# Patient Record
Sex: MALE | Race: WHITE | Employment: OTHER | URBAN - METROPOLITAN AREA
[De-identification: names, ages, dates, MRNs, and addresses within clinical notes are randomized per-mention and may not be internally consistent; named-entity substitution may affect disease eponyms.]

---

## 2017-01-06 ENCOUNTER — TELEPHONE (OUTPATIENT)
Dept: SURGERY | Age: 82
End: 2017-01-06

## 2017-01-10 ENCOUNTER — TELEPHONE (OUTPATIENT)
Dept: SURGERY | Age: 82
End: 2017-01-10

## 2017-01-12 ENCOUNTER — HOSPITAL ENCOUNTER (OUTPATIENT)
Dept: CARDIAC CATH/INVASIVE PROCEDURES | Age: 82
Discharge: HOME | End: 2017-01-12
Attending: SURGERY | Admitting: SURGERY

## 2017-01-12 VITALS — BODY MASS INDEX: 26.82 KG/M2 | HEIGHT: 65 IN | WEIGHT: 161 LBS

## 2017-01-12 DIAGNOSIS — I70.213 ATHEROSCLEROSIS OF NATIVE ARTERY OF BOTH LOWER EXTREMITIES WITH INTERMITTENT CLAUDICATION (HCC): Primary | ICD-10-CM

## 2017-01-12 LAB
ANION GAP SERPL CALCULATED.3IONS-SCNC: 10 MMOL/L (ref 3–16)
BUN BLDV-MCNC: 25 MG/DL (ref 7–20)
CALCIUM SERPL-MCNC: 9.2 MG/DL (ref 8.3–10.6)
CHLORIDE BLD-SCNC: 103 MMOL/L (ref 99–110)
CO2: 26 MMOL/L (ref 21–32)
CREAT SERPL-MCNC: 1.5 MG/DL (ref 0.8–1.3)
GFR AFRICAN AMERICAN: 54
GFR NON-AFRICAN AMERICAN: 45
GLUCOSE BLD-MCNC: 205 MG/DL (ref 70–99)
HCT VFR BLD CALC: 43.1 % (ref 40.5–52.5)
HEMOGLOBIN: 13.9 G/DL (ref 13.5–17.5)
MCH RBC QN AUTO: 28.4 PG (ref 26–34)
MCHC RBC AUTO-ENTMCNC: 32.2 G/DL (ref 31–36)
MCV RBC AUTO: 88.4 FL (ref 80–100)
PDW BLD-RTO: 14.8 % (ref 12.4–15.4)
PLATELET # BLD: 229 K/UL (ref 135–450)
PMV BLD AUTO: 8.5 FL (ref 5–10.5)
POTASSIUM SERPL-SCNC: 5.1 MMOL/L (ref 3.5–5.1)
RBC # BLD: 4.88 M/UL (ref 4.2–5.9)
SODIUM BLD-SCNC: 139 MMOL/L (ref 136–145)
WBC # BLD: 8.9 K/UL (ref 4–11)

## 2017-01-12 PROCEDURE — 37228 PR REVSC OPN/PRQ TIB/PERO W/ANGIOPLASTY UNI: CPT | Performed by: SURGERY

## 2017-01-12 PROCEDURE — 37224 PR REVSC OPN/PRG FEM/POP W/ANGIOPLASTY UNI: CPT | Performed by: SURGERY

## 2017-01-12 PROCEDURE — 75710 ARTERY X-RAYS ARM/LEG: CPT | Performed by: SURGERY

## 2017-01-12 PROCEDURE — 76937 US GUIDE VASCULAR ACCESS: CPT | Performed by: SURGERY

## 2017-01-12 PROCEDURE — 75774 ARTERY X-RAY EACH VESSEL: CPT | Performed by: SURGERY

## 2017-01-12 PROCEDURE — 37220 PR REVASCULARIZATION ILIAC ARTERY ANGIOP 1ST VSL: CPT | Performed by: SURGERY

## 2017-01-12 RX ORDER — MORPHINE SULFATE 10 MG/ML
2 INJECTION, SOLUTION INTRAMUSCULAR; INTRAVENOUS
Status: ACTIVE | OUTPATIENT
Start: 2017-01-12 | End: 2017-01-12

## 2017-01-12 RX ORDER — SODIUM CHLORIDE 0.9 % (FLUSH) 0.9 %
10 SYRINGE (ML) INJECTION EVERY 12 HOURS SCHEDULED
Status: DISCONTINUED | OUTPATIENT
Start: 2017-01-12 | End: 2017-01-12 | Stop reason: ALTCHOICE

## 2017-01-12 RX ORDER — ONDANSETRON 2 MG/ML
4 INJECTION INTRAMUSCULAR; INTRAVENOUS EVERY 6 HOURS PRN
Status: DISCONTINUED | OUTPATIENT
Start: 2017-01-12 | End: 2017-01-13 | Stop reason: HOSPADM

## 2017-01-12 RX ORDER — FENTANYL CITRATE 50 UG/ML
25 INJECTION, SOLUTION INTRAMUSCULAR; INTRAVENOUS
Status: ACTIVE | OUTPATIENT
Start: 2017-01-12 | End: 2017-01-12

## 2017-01-12 RX ORDER — ACETAMINOPHEN 325 MG/1
650 TABLET ORAL EVERY 4 HOURS PRN
Status: DISCONTINUED | OUTPATIENT
Start: 2017-01-12 | End: 2017-01-13 | Stop reason: HOSPADM

## 2017-01-12 RX ORDER — SODIUM CHLORIDE 0.9 % (FLUSH) 0.9 %
10 SYRINGE (ML) INJECTION PRN
Status: DISCONTINUED | OUTPATIENT
Start: 2017-01-12 | End: 2017-01-12 | Stop reason: ALTCHOICE

## 2017-01-12 RX ORDER — SODIUM CHLORIDE 9 MG/ML
INJECTION, SOLUTION INTRAVENOUS CONTINUOUS
Status: DISCONTINUED | OUTPATIENT
Start: 2017-01-12 | End: 2017-01-12 | Stop reason: ALTCHOICE

## 2017-01-12 RX ORDER — SODIUM CHLORIDE 9 MG/ML
INJECTION, SOLUTION INTRAVENOUS CONTINUOUS
Status: DISCONTINUED | OUTPATIENT
Start: 2017-01-12 | End: 2017-01-13 | Stop reason: HOSPADM

## 2017-01-12 RX ORDER — 0.9 % SODIUM CHLORIDE 0.9 %
500 INTRAVENOUS SOLUTION INTRAVENOUS PRN
Status: DISCONTINUED | OUTPATIENT
Start: 2017-01-12 | End: 2017-01-13 | Stop reason: HOSPADM

## 2017-01-13 ENCOUNTER — TELEPHONE (OUTPATIENT)
Dept: SURGERY | Age: 82
End: 2017-01-13

## 2017-01-17 ENCOUNTER — TELEPHONE (OUTPATIENT)
Dept: FAMILY MEDICINE CLINIC | Age: 82
End: 2017-01-17

## 2017-01-17 DIAGNOSIS — Z79.4 TYPE 2 DIABETES MELLITUS WITH CHRONIC KIDNEY DISEASE, WITH LONG-TERM CURRENT USE OF INSULIN, UNSPECIFIED CKD STAGE (HCC): ICD-10-CM

## 2017-01-17 DIAGNOSIS — E11.22 TYPE 2 DIABETES MELLITUS WITH CHRONIC KIDNEY DISEASE, WITH LONG-TERM CURRENT USE OF INSULIN, UNSPECIFIED CKD STAGE (HCC): ICD-10-CM

## 2017-01-17 DIAGNOSIS — I10 ESSENTIAL HYPERTENSION, BENIGN: Primary | ICD-10-CM

## 2017-01-18 DIAGNOSIS — E11.22 TYPE 2 DIABETES MELLITUS WITH CHRONIC KIDNEY DISEASE, WITH LONG-TERM CURRENT USE OF INSULIN, UNSPECIFIED CKD STAGE (HCC): ICD-10-CM

## 2017-01-18 DIAGNOSIS — Z79.4 TYPE 2 DIABETES MELLITUS WITH CHRONIC KIDNEY DISEASE, WITH LONG-TERM CURRENT USE OF INSULIN, UNSPECIFIED CKD STAGE (HCC): ICD-10-CM

## 2017-01-18 DIAGNOSIS — I10 ESSENTIAL HYPERTENSION, BENIGN: ICD-10-CM

## 2017-01-18 DIAGNOSIS — E78.2 MIXED HYPERLIPIDEMIA: ICD-10-CM

## 2017-01-18 LAB
A/G RATIO: 1.2 (ref 1.1–2.2)
ALBUMIN SERPL-MCNC: 3.9 G/DL (ref 3.4–5)
ALP BLD-CCNC: 104 U/L (ref 40–129)
ALT SERPL-CCNC: 10 U/L (ref 10–40)
ANION GAP SERPL CALCULATED.3IONS-SCNC: 15 MMOL/L (ref 3–16)
AST SERPL-CCNC: 13 U/L (ref 15–37)
BILIRUB SERPL-MCNC: 0.4 MG/DL (ref 0–1)
BUN BLDV-MCNC: 26 MG/DL (ref 7–20)
CALCIUM SERPL-MCNC: 9.4 MG/DL (ref 8.3–10.6)
CHLORIDE BLD-SCNC: 102 MMOL/L (ref 99–110)
CHOLESTEROL, TOTAL: 185 MG/DL (ref 0–199)
CO2: 24 MMOL/L (ref 21–32)
CREAT SERPL-MCNC: 1.6 MG/DL (ref 0.8–1.3)
GFR AFRICAN AMERICAN: 51
GFR NON-AFRICAN AMERICAN: 42
GLOBULIN: 3.2 G/DL
GLUCOSE BLD-MCNC: 185 MG/DL (ref 70–99)
HCT VFR BLD CALC: 43 % (ref 40.5–52.5)
HDLC SERPL-MCNC: 62 MG/DL (ref 40–60)
HEMOGLOBIN: 13.8 G/DL (ref 13.5–17.5)
LDL CHOLESTEROL CALCULATED: 94 MG/DL
MCH RBC QN AUTO: 28.2 PG (ref 26–34)
MCHC RBC AUTO-ENTMCNC: 32 G/DL (ref 31–36)
MCV RBC AUTO: 88.1 FL (ref 80–100)
PDW BLD-RTO: 14.6 % (ref 12.4–15.4)
PLATELET # BLD: 228 K/UL (ref 135–450)
PMV BLD AUTO: 8.7 FL (ref 5–10.5)
POTASSIUM SERPL-SCNC: 5.1 MMOL/L (ref 3.5–5.1)
RBC # BLD: 4.88 M/UL (ref 4.2–5.9)
SODIUM BLD-SCNC: 141 MMOL/L (ref 136–145)
TOTAL PROTEIN: 7.1 G/DL (ref 6.4–8.2)
TRIGL SERPL-MCNC: 145 MG/DL (ref 0–150)
TSH SERPL DL<=0.05 MIU/L-ACNC: 8.68 UIU/ML (ref 0.27–4.2)
VLDLC SERPL CALC-MCNC: 29 MG/DL
WBC # BLD: 8.6 K/UL (ref 4–11)

## 2017-01-19 LAB
ESTIMATED AVERAGE GLUCOSE: 151.3 MG/DL
HBA1C MFR BLD: 6.9 %

## 2017-01-26 ENCOUNTER — PROCEDURE VISIT (OUTPATIENT)
Dept: SURGERY | Age: 82
End: 2017-01-26

## 2017-01-26 DIAGNOSIS — I70.213 ATHEROSCLEROSIS OF NATIVE ARTERY OF BOTH LOWER EXTREMITIES WITH INTERMITTENT CLAUDICATION (HCC): Primary | ICD-10-CM

## 2017-01-26 PROCEDURE — 93926 LOWER EXTREMITY STUDY: CPT | Performed by: SURGERY

## 2017-01-27 ENCOUNTER — OFFICE VISIT (OUTPATIENT)
Dept: SURGERY | Age: 82
End: 2017-01-27

## 2017-01-27 VITALS
WEIGHT: 162 LBS | DIASTOLIC BLOOD PRESSURE: 88 MMHG | HEIGHT: 65 IN | HEART RATE: 75 BPM | BODY MASS INDEX: 26.99 KG/M2 | SYSTOLIC BLOOD PRESSURE: 186 MMHG

## 2017-01-27 DIAGNOSIS — I70.213 ATHEROSCLEROSIS OF NATIVE ARTERY OF BOTH LOWER EXTREMITIES WITH INTERMITTENT CLAUDICATION (HCC): Primary | ICD-10-CM

## 2017-01-27 PROCEDURE — 99213 OFFICE O/P EST LOW 20 MIN: CPT | Performed by: SURGERY

## 2017-01-27 RX ORDER — CLOPIDOGREL BISULFATE 75 MG/1
TABLET ORAL
Qty: 90 TABLET | Refills: 1 | Status: SHIPPED | OUTPATIENT
Start: 2017-01-27 | End: 2017-07-24 | Stop reason: SDUPTHER

## 2017-01-27 ASSESSMENT — ENCOUNTER SYMPTOMS
GASTROINTESTINAL NEGATIVE: 1
ALLERGIC/IMMUNOLOGIC NEGATIVE: 1
EYES NEGATIVE: 1
RESPIRATORY NEGATIVE: 1

## 2017-01-31 ENCOUNTER — OFFICE VISIT (OUTPATIENT)
Dept: FAMILY MEDICINE CLINIC | Age: 82
End: 2017-01-31

## 2017-01-31 VITALS
DIASTOLIC BLOOD PRESSURE: 82 MMHG | HEIGHT: 65 IN | HEART RATE: 72 BPM | WEIGHT: 161.2 LBS | TEMPERATURE: 97.5 F | SYSTOLIC BLOOD PRESSURE: 124 MMHG | BODY MASS INDEX: 26.86 KG/M2

## 2017-01-31 DIAGNOSIS — I65.29 STENOSIS OF CAROTID ARTERY, UNSPECIFIED LATERALITY: ICD-10-CM

## 2017-01-31 DIAGNOSIS — Z12.83 SKIN CANCER SCREENING: ICD-10-CM

## 2017-01-31 DIAGNOSIS — Z12.11 COLON CANCER SCREENING: ICD-10-CM

## 2017-01-31 DIAGNOSIS — E03.9 ACQUIRED HYPOTHYROIDISM: ICD-10-CM

## 2017-01-31 DIAGNOSIS — E11.22 TYPE 2 DIABETES MELLITUS WITH CHRONIC KIDNEY DISEASE, WITH LONG-TERM CURRENT USE OF INSULIN, UNSPECIFIED CKD STAGE (HCC): ICD-10-CM

## 2017-01-31 DIAGNOSIS — I10 ESSENTIAL HYPERTENSION, BENIGN: Primary | ICD-10-CM

## 2017-01-31 DIAGNOSIS — E78.2 MIXED HYPERLIPIDEMIA: ICD-10-CM

## 2017-01-31 DIAGNOSIS — Z79.4 TYPE 2 DIABETES MELLITUS WITH CHRONIC KIDNEY DISEASE, WITH LONG-TERM CURRENT USE OF INSULIN, UNSPECIFIED CKD STAGE (HCC): ICD-10-CM

## 2017-01-31 PROCEDURE — 99214 OFFICE O/P EST MOD 30 MIN: CPT | Performed by: FAMILY MEDICINE

## 2017-01-31 RX ORDER — LEVOTHYROXINE SODIUM 0.03 MG/1
25 TABLET ORAL DAILY
Qty: 30 TABLET | Refills: 3 | Status: SHIPPED | OUTPATIENT
Start: 2017-01-31 | End: 2017-01-31 | Stop reason: SDUPTHER

## 2017-01-31 RX ORDER — LEVOTHYROXINE SODIUM 0.03 MG/1
TABLET ORAL
Qty: 90 TABLET | Refills: 1 | Status: SHIPPED | OUTPATIENT
Start: 2017-01-31 | End: 2017-05-17 | Stop reason: SDUPTHER

## 2017-01-31 ASSESSMENT — ENCOUNTER SYMPTOMS
NAUSEA: 0
ABDOMINAL PAIN: 0
SHORTNESS OF BREATH: 0
DIARRHEA: 0
CHEST TIGHTNESS: 0
CONSTIPATION: 0
BLOOD IN STOOL: 0
COUGH: 0
ABDOMINAL DISTENTION: 0
VOMITING: 0

## 2017-02-20 RX ORDER — INSULIN DETEMIR 100 [IU]/ML
INJECTION, SOLUTION SUBCUTANEOUS
Qty: 15 ML | Refills: 0 | Status: SHIPPED | OUTPATIENT
Start: 2017-02-20 | End: 2017-03-23 | Stop reason: SDUPTHER

## 2017-04-20 ENCOUNTER — TELEPHONE (OUTPATIENT)
Dept: FAMILY MEDICINE CLINIC | Age: 82
End: 2017-04-20

## 2017-04-20 DIAGNOSIS — E03.9 ACQUIRED HYPOTHYROIDISM: ICD-10-CM

## 2017-04-20 DIAGNOSIS — Z79.4 TYPE 2 DIABETES MELLITUS WITH CHRONIC KIDNEY DISEASE, WITH LONG-TERM CURRENT USE OF INSULIN, UNSPECIFIED CKD STAGE (HCC): ICD-10-CM

## 2017-04-20 DIAGNOSIS — I10 ESSENTIAL HYPERTENSION, BENIGN: Primary | ICD-10-CM

## 2017-04-20 DIAGNOSIS — E11.22 TYPE 2 DIABETES MELLITUS WITH CHRONIC KIDNEY DISEASE, WITH LONG-TERM CURRENT USE OF INSULIN, UNSPECIFIED CKD STAGE (HCC): ICD-10-CM

## 2017-04-20 DIAGNOSIS — E78.2 MIXED HYPERLIPIDEMIA: ICD-10-CM

## 2017-04-24 DIAGNOSIS — E03.9 ACQUIRED HYPOTHYROIDISM: ICD-10-CM

## 2017-04-24 DIAGNOSIS — Z79.4 TYPE 2 DIABETES MELLITUS WITH CHRONIC KIDNEY DISEASE, WITH LONG-TERM CURRENT USE OF INSULIN, UNSPECIFIED CKD STAGE (HCC): ICD-10-CM

## 2017-04-24 DIAGNOSIS — E11.22 TYPE 2 DIABETES MELLITUS WITH CHRONIC KIDNEY DISEASE, WITH LONG-TERM CURRENT USE OF INSULIN, UNSPECIFIED CKD STAGE (HCC): ICD-10-CM

## 2017-04-24 DIAGNOSIS — E78.2 MIXED HYPERLIPIDEMIA: ICD-10-CM

## 2017-04-24 DIAGNOSIS — I10 ESSENTIAL HYPERTENSION, BENIGN: ICD-10-CM

## 2017-04-24 LAB
A/G RATIO: 1.5 (ref 1.1–2.2)
ALBUMIN SERPL-MCNC: 4 G/DL (ref 3.4–5)
ALP BLD-CCNC: 97 U/L (ref 40–129)
ALT SERPL-CCNC: 9 U/L (ref 10–40)
ANION GAP SERPL CALCULATED.3IONS-SCNC: 13 MMOL/L (ref 3–16)
AST SERPL-CCNC: 11 U/L (ref 15–37)
BILIRUB SERPL-MCNC: 0.3 MG/DL (ref 0–1)
BILIRUBIN URINE: NEGATIVE
BLOOD, URINE: ABNORMAL
BUN BLDV-MCNC: 28 MG/DL (ref 7–20)
CALCIUM SERPL-MCNC: 9.3 MG/DL (ref 8.3–10.6)
CHLORIDE BLD-SCNC: 106 MMOL/L (ref 99–110)
CHOLESTEROL, TOTAL: 156 MG/DL (ref 0–199)
CLARITY: CLEAR
CO2: 25 MMOL/L (ref 21–32)
COLOR: YELLOW
CREAT SERPL-MCNC: 1.6 MG/DL (ref 0.8–1.3)
EPITHELIAL CELLS, UA: 0 /HPF (ref 0–5)
ESTIMATED AVERAGE GLUCOSE: 154.2 MG/DL
GFR AFRICAN AMERICAN: 51
GFR NON-AFRICAN AMERICAN: 42
GLOBULIN: 2.7 G/DL
GLUCOSE BLD-MCNC: 126 MG/DL (ref 70–99)
GLUCOSE URINE: NEGATIVE MG/DL
HBA1C MFR BLD: 7 %
HYALINE CASTS: 0 /LPF (ref 0–8)
KETONES, URINE: NEGATIVE MG/DL
LDL CHOLESTEROL DIRECT: 84 MG/DL
LEUKOCYTE ESTERASE, URINE: NEGATIVE
MICROSCOPIC EXAMINATION: YES
NITRITE, URINE: NEGATIVE
PH UA: 6.5
POTASSIUM SERPL-SCNC: 4.7 MMOL/L (ref 3.5–5.1)
PROTEIN UA: >=300 MG/DL
RBC UA: 3 /HPF (ref 0–4)
SODIUM BLD-SCNC: 144 MMOL/L (ref 136–145)
SPECIFIC GRAVITY UA: 1.01
T4 FREE: 1.1 NG/DL (ref 0.9–1.8)
TOTAL PROTEIN: 6.7 G/DL (ref 6.4–8.2)
TSH SERPL DL<=0.05 MIU/L-ACNC: 3.26 UIU/ML (ref 0.27–4.2)
UROBILINOGEN, URINE: 0.2 E.U./DL
WBC UA: 1 /HPF (ref 0–5)

## 2017-05-02 ENCOUNTER — OFFICE VISIT (OUTPATIENT)
Dept: FAMILY MEDICINE CLINIC | Age: 82
End: 2017-05-02

## 2017-05-02 VITALS
TEMPERATURE: 97.4 F | HEART RATE: 64 BPM | HEIGHT: 65 IN | BODY MASS INDEX: 27.56 KG/M2 | WEIGHT: 165.4 LBS | SYSTOLIC BLOOD PRESSURE: 142 MMHG | DIASTOLIC BLOOD PRESSURE: 80 MMHG

## 2017-05-02 DIAGNOSIS — E11.22 TYPE 2 DIABETES MELLITUS WITH CHRONIC KIDNEY DISEASE, WITH LONG-TERM CURRENT USE OF INSULIN, UNSPECIFIED CKD STAGE (HCC): ICD-10-CM

## 2017-05-02 DIAGNOSIS — I10 ESSENTIAL HYPERTENSION, BENIGN: Primary | ICD-10-CM

## 2017-05-02 DIAGNOSIS — E78.2 MIXED HYPERLIPIDEMIA: ICD-10-CM

## 2017-05-02 DIAGNOSIS — Z79.4 TYPE 2 DIABETES MELLITUS WITH CHRONIC KIDNEY DISEASE, WITH LONG-TERM CURRENT USE OF INSULIN, UNSPECIFIED CKD STAGE (HCC): ICD-10-CM

## 2017-05-02 PROCEDURE — 99214 OFFICE O/P EST MOD 30 MIN: CPT | Performed by: FAMILY MEDICINE

## 2017-05-02 ASSESSMENT — ENCOUNTER SYMPTOMS
BLOOD IN STOOL: 0
CONSTIPATION: 0
NAUSEA: 0
ABDOMINAL DISTENTION: 0
DIARRHEA: 0
VOMITING: 0
SHORTNESS OF BREATH: 0
ABDOMINAL PAIN: 0
CHEST TIGHTNESS: 0
COUGH: 0

## 2017-05-17 RX ORDER — LEVOTHYROXINE SODIUM 0.03 MG/1
TABLET ORAL
Qty: 30 TABLET | Refills: 5 | Status: SHIPPED | OUTPATIENT
Start: 2017-05-17 | End: 2017-11-12 | Stop reason: SDUPTHER

## 2017-06-19 RX ORDER — ATORVASTATIN CALCIUM 40 MG/1
TABLET, FILM COATED ORAL
Qty: 90 TABLET | Refills: 1 | Status: SHIPPED | OUTPATIENT
Start: 2017-06-19 | End: 2017-12-13 | Stop reason: SDUPTHER

## 2017-07-24 RX ORDER — CLOPIDOGREL BISULFATE 75 MG/1
TABLET ORAL
Qty: 90 TABLET | Refills: 1 | Status: SHIPPED | OUTPATIENT
Start: 2017-07-24 | End: 2018-01-15 | Stop reason: SDUPTHER

## 2017-09-05 ENCOUNTER — OFFICE VISIT (OUTPATIENT)
Dept: FAMILY MEDICINE CLINIC | Age: 82
End: 2017-09-05

## 2017-09-05 VITALS
HEIGHT: 65 IN | DIASTOLIC BLOOD PRESSURE: 80 MMHG | SYSTOLIC BLOOD PRESSURE: 140 MMHG | WEIGHT: 164.6 LBS | HEART RATE: 68 BPM | BODY MASS INDEX: 27.42 KG/M2

## 2017-09-05 DIAGNOSIS — Z23 NEED FOR TDAP VACCINATION: ICD-10-CM

## 2017-09-05 DIAGNOSIS — E11.22 TYPE 2 DIABETES MELLITUS WITH CHRONIC KIDNEY DISEASE, WITH LONG-TERM CURRENT USE OF INSULIN, UNSPECIFIED CKD STAGE (HCC): ICD-10-CM

## 2017-09-05 DIAGNOSIS — Z23 FLU VACCINE NEED: ICD-10-CM

## 2017-09-05 DIAGNOSIS — I10 ESSENTIAL HYPERTENSION, BENIGN: Primary | ICD-10-CM

## 2017-09-05 DIAGNOSIS — Z79.4 TYPE 2 DIABETES MELLITUS WITH CHRONIC KIDNEY DISEASE, WITH LONG-TERM CURRENT USE OF INSULIN, UNSPECIFIED CKD STAGE (HCC): ICD-10-CM

## 2017-09-05 DIAGNOSIS — E78.2 MIXED HYPERLIPIDEMIA: ICD-10-CM

## 2017-09-05 PROCEDURE — 90715 TDAP VACCINE 7 YRS/> IM: CPT | Performed by: FAMILY MEDICINE

## 2017-09-05 PROCEDURE — G0008 ADMIN INFLUENZA VIRUS VAC: HCPCS | Performed by: FAMILY MEDICINE

## 2017-09-05 PROCEDURE — 99214 OFFICE O/P EST MOD 30 MIN: CPT | Performed by: FAMILY MEDICINE

## 2017-09-05 PROCEDURE — 90662 IIV NO PRSV INCREASED AG IM: CPT | Performed by: FAMILY MEDICINE

## 2017-09-05 PROCEDURE — 90471 IMMUNIZATION ADMIN: CPT | Performed by: FAMILY MEDICINE

## 2017-09-05 ASSESSMENT — ENCOUNTER SYMPTOMS
BLOOD IN STOOL: 0
COUGH: 0
CONSTIPATION: 0
DIARRHEA: 0
SHORTNESS OF BREATH: 0
CHEST TIGHTNESS: 0
ABDOMINAL DISTENTION: 0
ABDOMINAL PAIN: 0
VOMITING: 0
NAUSEA: 0

## 2017-09-21 RX ORDER — QUINAPRIL 40 MG/1
TABLET ORAL
Qty: 30 TABLET | Refills: 5 | Status: SHIPPED | OUTPATIENT
Start: 2017-09-21 | End: 2018-01-05 | Stop reason: SDUPTHER

## 2017-11-13 RX ORDER — LEVOTHYROXINE SODIUM 0.03 MG/1
TABLET ORAL
Qty: 30 TABLET | Refills: 3 | Status: SHIPPED | OUTPATIENT
Start: 2017-11-13 | End: 2018-03-17 | Stop reason: SDUPTHER

## 2017-12-13 RX ORDER — ATORVASTATIN CALCIUM 40 MG/1
TABLET, FILM COATED ORAL
Qty: 90 TABLET | Refills: 0 | Status: SHIPPED | OUTPATIENT
Start: 2017-12-13 | End: 2018-03-11 | Stop reason: SDUPTHER

## 2017-12-28 DIAGNOSIS — E11.22 TYPE 2 DIABETES MELLITUS WITH CHRONIC KIDNEY DISEASE, WITH LONG-TERM CURRENT USE OF INSULIN, UNSPECIFIED CKD STAGE (HCC): ICD-10-CM

## 2017-12-28 DIAGNOSIS — Z79.4 TYPE 2 DIABETES MELLITUS WITH CHRONIC KIDNEY DISEASE, WITH LONG-TERM CURRENT USE OF INSULIN, UNSPECIFIED CKD STAGE (HCC): ICD-10-CM

## 2017-12-28 DIAGNOSIS — I10 ESSENTIAL HYPERTENSION, BENIGN: ICD-10-CM

## 2017-12-28 LAB
A/G RATIO: 1.2 (ref 1.1–2.2)
ALBUMIN SERPL-MCNC: 3.8 G/DL (ref 3.4–5)
ALP BLD-CCNC: 102 U/L (ref 40–129)
ALT SERPL-CCNC: 11 U/L (ref 10–40)
ANION GAP SERPL CALCULATED.3IONS-SCNC: 12 MMOL/L (ref 3–16)
AST SERPL-CCNC: 14 U/L (ref 15–37)
BILIRUB SERPL-MCNC: 0.3 MG/DL (ref 0–1)
BUN BLDV-MCNC: 29 MG/DL (ref 7–20)
CALCIUM SERPL-MCNC: 9.4 MG/DL (ref 8.3–10.6)
CHLORIDE BLD-SCNC: 105 MMOL/L (ref 99–110)
CO2: 26 MMOL/L (ref 21–32)
CREAT SERPL-MCNC: 1.7 MG/DL (ref 0.8–1.3)
GFR AFRICAN AMERICAN: 47
GFR NON-AFRICAN AMERICAN: 39
GLOBULIN: 3.2 G/DL
GLUCOSE BLD-MCNC: 121 MG/DL (ref 70–99)
POTASSIUM SERPL-SCNC: 5.4 MMOL/L (ref 3.5–5.1)
SODIUM BLD-SCNC: 143 MMOL/L (ref 136–145)
TOTAL PROTEIN: 7 G/DL (ref 6.4–8.2)

## 2017-12-29 LAB
ESTIMATED AVERAGE GLUCOSE: 168.6 MG/DL
HBA1C MFR BLD: 7.5 %

## 2018-01-05 ENCOUNTER — OFFICE VISIT (OUTPATIENT)
Dept: FAMILY MEDICINE CLINIC | Age: 83
End: 2018-01-05

## 2018-01-05 VITALS
DIASTOLIC BLOOD PRESSURE: 86 MMHG | BODY MASS INDEX: 27.52 KG/M2 | WEIGHT: 165.2 LBS | HEIGHT: 65 IN | TEMPERATURE: 97.5 F | HEART RATE: 72 BPM | SYSTOLIC BLOOD PRESSURE: 138 MMHG

## 2018-01-05 DIAGNOSIS — E78.2 MIXED HYPERLIPIDEMIA: ICD-10-CM

## 2018-01-05 DIAGNOSIS — Z79.4 TYPE 2 DIABETES MELLITUS WITH CHRONIC KIDNEY DISEASE, WITH LONG-TERM CURRENT USE OF INSULIN, UNSPECIFIED CKD STAGE (HCC): ICD-10-CM

## 2018-01-05 DIAGNOSIS — I10 ESSENTIAL HYPERTENSION, BENIGN: Primary | ICD-10-CM

## 2018-01-05 DIAGNOSIS — Z85.46 HISTORY OF PROSTATE CANCER: ICD-10-CM

## 2018-01-05 DIAGNOSIS — E87.5 SERUM POTASSIUM ELEVATED: ICD-10-CM

## 2018-01-05 DIAGNOSIS — E11.22 TYPE 2 DIABETES MELLITUS WITH CHRONIC KIDNEY DISEASE, WITH LONG-TERM CURRENT USE OF INSULIN, UNSPECIFIED CKD STAGE (HCC): ICD-10-CM

## 2018-01-05 PROCEDURE — 99214 OFFICE O/P EST MOD 30 MIN: CPT | Performed by: FAMILY MEDICINE

## 2018-01-05 RX ORDER — QUINAPRIL 40 MG/1
20 TABLET ORAL DAILY
Qty: 30 TABLET | Refills: 5
Start: 2018-01-05 | End: 2018-03-17 | Stop reason: CLARIF

## 2018-01-05 ASSESSMENT — PATIENT HEALTH QUESTIONNAIRE - PHQ9
1. LITTLE INTEREST OR PLEASURE IN DOING THINGS: 0
SUM OF ALL RESPONSES TO PHQ9 QUESTIONS 1 & 2: 0
2. FEELING DOWN, DEPRESSED OR HOPELESS: 0
SUM OF ALL RESPONSES TO PHQ QUESTIONS 1-9: 0

## 2018-01-05 ASSESSMENT — ENCOUNTER SYMPTOMS
NAUSEA: 0
ABDOMINAL PAIN: 0
DIARRHEA: 0
CHEST TIGHTNESS: 0
SHORTNESS OF BREATH: 0
BLOOD IN STOOL: 0
COUGH: 0
BACK PAIN: 0
ABDOMINAL DISTENTION: 0
CONSTIPATION: 0
VOMITING: 0

## 2018-01-05 NOTE — PATIENT INSTRUCTIONS
Continue to see dr Tamir Jones for close follow up  Get blood work with in the next few weeks  Stay with the diet  See me back in 4 months      Patient Education        Preventing Falls: Care Instructions  Your Care Instructions    Getting around your home safely can be a challenge if you have injuries or health problems that make it easy for you to fall. Loose rugs and furniture in walkways are among the dangers for many older people who have problems walking or who have poor eyesight. People who have conditions such as arthritis, osteoporosis, or dementia also have to be careful not to fall. You can make your home safer with a few simple measures. Follow-up care is a key part of your treatment and safety. Be sure to make and go to all appointments, and call your doctor if you are having problems. It's also a good idea to know your test results and keep a list of the medicines you take. How can you care for yourself at home? Taking care of yourself  · You may get dizzy if you do not drink enough water. To prevent dehydration, drink plenty of fluids, enough so that your urine is light yellow or clear like water. Choose water and other caffeine-free clear liquids. If you have kidney, heart, or liver disease and have to limit fluids, talk with your doctor before you increase the amount of fluids you drink. · Exercise regularly to improve your strength, muscle tone, and balance. Walk if you can. Swimming may be a good choice if you cannot walk easily. · Have your vision and hearing checked each year or any time you notice a change. If you have trouble seeing and hearing, you might not be able to avoid objects and could lose your balance. · Know the side effects of the medicines you take. Ask your doctor or pharmacist whether the medicines you take can affect your balance. Sleeping pills or sedatives can affect your balance. · Limit the amount of alcohol you drink.  Alcohol can impair your balance and other senses. · Ask your doctor whether calluses or corns on your feet need to be removed. If you wear loose-fitting shoes because of calluses or corns, you can lose your balance and fall. · Talk to your doctor if you have numbness in your feet. Preventing falls at home  · Remove raised doorway thresholds, throw rugs, and clutter. Repair loose carpet or raised areas in the floor. · Move furniture and electrical cords to keep them out of walking paths. · Use nonskid floor wax, and wipe up spills right away, especially on ceramic tile floors. · If you use a walker or cane, put rubber tips on it. If you use crutches, clean the bottoms of them regularly with an abrasive pad, such as steel wool. · Keep your house well lit, especially [address] [place] [address], and outside walkways. Use night-lights in areas such as hallways and bathrooms. Add extra light switches or use remote switches (such as switches that go on or off when you clap your hands) to make it easier to turn lights on if you have to get up during the night. · Install sturdy handrails on stairways. · Move items in your cabinets so that the things you use a lot are on the lower shelves (about waist level). · Keep a cordless phone and a flashlight with new batteries by your bed. If possible, put a phone in each of the main rooms of your house, or carry a cell phone in case you fall and cannot reach a phone. Or, you can wear a device around your neck or wrist. You push a button that sends a signal for help. · Wear low-heeled shoes that fit well and give your feet good support. Use footwear with nonskid soles. Check the heels and soles of your shoes for wear. Repair or replace worn heels or soles. · Do not wear socks without shoes on wood floors. · Walk on the grass when the sidewalks are slippery. If you live in an area that gets snow and ice in the winter, sprinkle salt on slippery steps and sidewalks.   Preventing falls in the bath  · Install grab bars and list of the medicines you take. How can you prevent falls outdoors? · Wear shoes with firm soles and low heels. If you have to walk on an icy surface, use grippers that can be worn over your shoes in bad weather. · Be extra careful if weather is bad. Walk on the grass when the sidewalks are slick. If you live in a place that gets snow and ice in the winter, sprinkle salt on slippery stairs and sidewalks. · Be careful getting on or off buses and trains or getting in and out of cars. If handrails are available, use them. · Be careful when you cross the street. Look for crosswalks or places where curb cuts or ramps are present. · Try not to hurry, especially if you are carrying something. · Be cautious in parking lots or garages. There may be curbs or changes in pavement, or the height of the pavement may vary. · Make sure to wear the correct eyeglasses, if you need them. Reading glasses or bifocals can make it harder to see hazards that might be in your way. · If you are walking outdoors for exercise, try to:  ¨ Walk in well-lighted, well-maintained areas. These include high school or college tracks, shopping malls, and public spaces. ¨ Walk with a partner. ¨ Watch out for cracked sidewalks, curbs, changes in the height of the pavement, exposed tree roots, and debris such as fallen leaves or branches. Where can you learn more? Go to https://[employer] manuel.[employer]. org and sign in to your [employer] account. Enter S797 in the [facility] box to learn more about \"Preventing Outdoor Falls: Care Instructions. \"     If you do not have an account, please click on the \"Sign Up Now\" link. Current as of: May 12, 2017  Content Version: 11.5  © 3683-2058 [employer]. Care instructions adapted under license by [facility] ([facility] and the [facility]).  If you have questions about a medical condition or this instruction, always ask your healthcare professional. Satnam Dowd disclaims any warranty or a fungus can grow, which can lead to infection. ¨ Keep your skin soft. Use moisturizing skin cream to keep the skin on your feet soft and prevent calluses and cracks. But do not put the cream between your toes, and stop using any cream that causes a rash. ¨ Clean underneath your toenails carefully. Do not use a sharp object to clean underneath your toenails. Use the blunt end of a nail file or other rounded tool. ¨ Trim and file your toenails straight across to prevent ingrown toenails. Use a nail clipper, not scissors. Use an emery board to smooth the edges. · Change socks daily. Socks without seams are best, because seams often rub the feet. You can find socks for people with diabetes from specialty catalogs. · Look inside your shoes every day for things like gravel or torn linings, which could cause blisters or sores. · Buy shoes that fit well:  ¨ Look for shoes that have plenty of space around the toes. This helps prevent bunions and blisters. ¨ Try on shoes while wearing the kind of socks you will usually wear with the shoes. ¨ Avoid plastic shoes. They may rub your feet and cause blisters. Good shoes should be made of materials that are flexible and breathable, such as leather or cloth. ¨ Break in new shoes slowly by wearing them for no more than an hour a day for several days. Take extra time to check your feet for red areas, blisters, or other problems after you wear new shoes. · Do not go barefoot. Do not wear sandals, and do not wear shoes with very thin soles. Thin soles are easy to puncture. They also do not protect your feet from hot pavement or cold weather. · Have your doctor check your feet during each visit. If you have a foot problem, see your doctor. Do not try to treat an early foot problem at home. Home remedies or treatments that you can buy without a prescription (such as corn removers) can be harmful. · Always get early treatment for foot problems.  A minor irritation can lead to a

## 2018-01-15 RX ORDER — CLOPIDOGREL BISULFATE 75 MG/1
TABLET ORAL
Qty: 90 TABLET | Refills: 1 | Status: SHIPPED | OUTPATIENT
Start: 2018-01-15 | End: 2018-07-22 | Stop reason: SDUPTHER

## 2018-02-16 DIAGNOSIS — I10 ESSENTIAL HYPERTENSION, BENIGN: ICD-10-CM

## 2018-02-16 DIAGNOSIS — E87.5 SERUM POTASSIUM ELEVATED: ICD-10-CM

## 2018-02-16 DIAGNOSIS — Z85.46 HISTORY OF PROSTATE CANCER: ICD-10-CM

## 2018-02-16 LAB
ANION GAP SERPL CALCULATED.3IONS-SCNC: 13 MMOL/L (ref 3–16)
BUN BLDV-MCNC: 37 MG/DL (ref 7–20)
CALCIUM SERPL-MCNC: 9.2 MG/DL (ref 8.3–10.6)
CHLORIDE BLD-SCNC: 108 MMOL/L (ref 99–110)
CO2: 24 MMOL/L (ref 21–32)
CREAT SERPL-MCNC: 1.8 MG/DL (ref 0.8–1.3)
GFR AFRICAN AMERICAN: 44
GFR NON-AFRICAN AMERICAN: 36
GLUCOSE BLD-MCNC: 152 MG/DL (ref 70–99)
POTASSIUM SERPL-SCNC: 4.8 MMOL/L (ref 3.5–5.1)
PROSTATE SPECIFIC ANTIGEN: 3.86 NG/ML (ref 0–4)
SODIUM BLD-SCNC: 145 MMOL/L (ref 136–145)

## 2018-03-12 RX ORDER — ATORVASTATIN CALCIUM 40 MG/1
TABLET, FILM COATED ORAL
Qty: 90 TABLET | Refills: 1 | Status: SHIPPED | OUTPATIENT
Start: 2018-03-12 | End: 2018-09-22 | Stop reason: SDUPTHER

## 2018-04-23 RX ORDER — INSULIN DETEMIR 100 [IU]/ML
INJECTION, SOLUTION SUBCUTANEOUS
Qty: 15 ML | Refills: 1 | Status: SHIPPED | OUTPATIENT
Start: 2018-04-23 | End: 2018-12-14 | Stop reason: SDUPTHER

## 2018-05-07 ENCOUNTER — OFFICE VISIT (OUTPATIENT)
Dept: FAMILY MEDICINE CLINIC | Age: 83
End: 2018-05-07

## 2018-05-07 VITALS
TEMPERATURE: 97.5 F | SYSTOLIC BLOOD PRESSURE: 138 MMHG | BODY MASS INDEX: 28.72 KG/M2 | HEIGHT: 65 IN | HEART RATE: 64 BPM | WEIGHT: 172.4 LBS | DIASTOLIC BLOOD PRESSURE: 80 MMHG

## 2018-05-07 DIAGNOSIS — E11.22 TYPE 2 DIABETES MELLITUS WITH CHRONIC KIDNEY DISEASE, WITH LONG-TERM CURRENT USE OF INSULIN, UNSPECIFIED CKD STAGE (HCC): ICD-10-CM

## 2018-05-07 DIAGNOSIS — Z79.4 TYPE 2 DIABETES MELLITUS WITH CHRONIC KIDNEY DISEASE, WITH LONG-TERM CURRENT USE OF INSULIN, UNSPECIFIED CKD STAGE (HCC): ICD-10-CM

## 2018-05-07 DIAGNOSIS — E03.9 ACQUIRED HYPOTHYROIDISM: ICD-10-CM

## 2018-05-07 DIAGNOSIS — Z85.46 HISTORY OF PROSTATE CANCER: ICD-10-CM

## 2018-05-07 DIAGNOSIS — E78.2 MIXED HYPERLIPIDEMIA: ICD-10-CM

## 2018-05-07 DIAGNOSIS — I10 ESSENTIAL HYPERTENSION, BENIGN: Primary | ICD-10-CM

## 2018-05-07 PROCEDURE — 99214 OFFICE O/P EST MOD 30 MIN: CPT | Performed by: FAMILY MEDICINE

## 2018-05-07 ASSESSMENT — ENCOUNTER SYMPTOMS
DIARRHEA: 0
NAUSEA: 0
VOMITING: 0
CONSTIPATION: 0
BLOOD IN STOOL: 0
SHORTNESS OF BREATH: 0
ABDOMINAL DISTENTION: 0
ABDOMINAL PAIN: 0
BACK PAIN: 0
CHEST TIGHTNESS: 0

## 2018-05-23 RX ORDER — QUINAPRIL 40 MG/1
TABLET ORAL
Qty: 90 TABLET | Refills: 2 | Status: SHIPPED | OUTPATIENT
Start: 2018-05-23 | End: 2018-09-07 | Stop reason: SDUPTHER

## 2018-07-23 RX ORDER — CLOPIDOGREL BISULFATE 75 MG/1
TABLET ORAL
Qty: 90 TABLET | Refills: 0 | OUTPATIENT
Start: 2018-07-23

## 2018-07-23 RX ORDER — CLOPIDOGREL BISULFATE 75 MG/1
TABLET ORAL
Qty: 90 TABLET | Refills: 0 | Status: SHIPPED | OUTPATIENT
Start: 2018-07-23 | End: 2019-01-24 | Stop reason: SDUPTHER

## 2018-07-24 ENCOUNTER — PROCEDURE VISIT (OUTPATIENT)
Dept: SURGERY | Age: 83
End: 2018-07-24

## 2018-07-24 ENCOUNTER — OFFICE VISIT (OUTPATIENT)
Dept: SURGERY | Age: 83
End: 2018-07-24

## 2018-07-24 VITALS
BODY MASS INDEX: 27.29 KG/M2 | SYSTOLIC BLOOD PRESSURE: 188 MMHG | WEIGHT: 164 LBS | DIASTOLIC BLOOD PRESSURE: 77 MMHG | HEART RATE: 67 BPM

## 2018-07-24 DIAGNOSIS — I73.9 PAD (PERIPHERAL ARTERY DISEASE) (HCC): ICD-10-CM

## 2018-07-24 DIAGNOSIS — I70.213 ATHEROSCLEROSIS OF NATIVE ARTERY OF BOTH LOWER EXTREMITIES WITH INTERMITTENT CLAUDICATION (HCC): Primary | ICD-10-CM

## 2018-07-24 DIAGNOSIS — I10 ESSENTIAL HYPERTENSION, BENIGN: ICD-10-CM

## 2018-07-24 DIAGNOSIS — E78.2 MIXED HYPERLIPIDEMIA: ICD-10-CM

## 2018-07-24 DIAGNOSIS — I65.29 STENOSIS OF CAROTID ARTERY, UNSPECIFIED LATERALITY: ICD-10-CM

## 2018-07-24 PROCEDURE — 93925 LOWER EXTREMITY STUDY: CPT | Performed by: SURGERY

## 2018-07-24 PROCEDURE — 99213 OFFICE O/P EST LOW 20 MIN: CPT | Performed by: SURGERY

## 2018-07-24 ASSESSMENT — ENCOUNTER SYMPTOMS
GASTROINTESTINAL NEGATIVE: 1
RESPIRATORY NEGATIVE: 1
EYES NEGATIVE: 1
ALLERGIC/IMMUNOLOGIC NEGATIVE: 1

## 2018-07-24 NOTE — PATIENT INSTRUCTIONS
The patient is to continue daily Plavix 75 mg and to continue daily Lipitor for statin therapy. We will see him back in 6 months for Arterial Duplex Scan, GAURAV's, and Office Visit.

## 2018-08-27 RX ORDER — LEVOTHYROXINE SODIUM 0.03 MG/1
TABLET ORAL
Qty: 30 TABLET | Refills: 0 | Status: SHIPPED | OUTPATIENT
Start: 2018-08-27 | End: 2018-10-02 | Stop reason: SDUPTHER

## 2018-09-05 DIAGNOSIS — I10 ESSENTIAL HYPERTENSION, BENIGN: ICD-10-CM

## 2018-09-05 DIAGNOSIS — E03.9 ACQUIRED HYPOTHYROIDISM: ICD-10-CM

## 2018-09-05 DIAGNOSIS — E78.2 MIXED HYPERLIPIDEMIA: ICD-10-CM

## 2018-09-05 DIAGNOSIS — Z79.4 TYPE 2 DIABETES MELLITUS WITH CHRONIC KIDNEY DISEASE, WITH LONG-TERM CURRENT USE OF INSULIN, UNSPECIFIED CKD STAGE (HCC): ICD-10-CM

## 2018-09-05 DIAGNOSIS — E11.22 TYPE 2 DIABETES MELLITUS WITH CHRONIC KIDNEY DISEASE, WITH LONG-TERM CURRENT USE OF INSULIN, UNSPECIFIED CKD STAGE (HCC): ICD-10-CM

## 2018-09-05 LAB
A/G RATIO: 1.2 (ref 1.1–2.2)
ALBUMIN SERPL-MCNC: 3.7 G/DL (ref 3.4–5)
ALP BLD-CCNC: 105 U/L (ref 40–129)
ALT SERPL-CCNC: 9 U/L (ref 10–40)
ANION GAP SERPL CALCULATED.3IONS-SCNC: 13 MMOL/L (ref 3–16)
AST SERPL-CCNC: 11 U/L (ref 15–37)
BILIRUB SERPL-MCNC: 0.3 MG/DL (ref 0–1)
BUN BLDV-MCNC: 48 MG/DL (ref 7–20)
CALCIUM SERPL-MCNC: 9.1 MG/DL (ref 8.3–10.6)
CHLORIDE BLD-SCNC: 108 MMOL/L (ref 99–110)
CHOLESTEROL, TOTAL: 145 MG/DL (ref 0–199)
CO2: 23 MMOL/L (ref 21–32)
CREAT SERPL-MCNC: 2.3 MG/DL (ref 0.8–1.3)
ESTIMATED AVERAGE GLUCOSE: 154.2 MG/DL
FOLATE: 4.93 NG/ML (ref 4.78–24.2)
GFR AFRICAN AMERICAN: 33
GFR NON-AFRICAN AMERICAN: 27
GLOBULIN: 3.1 G/DL
GLUCOSE BLD-MCNC: 113 MG/DL (ref 70–99)
HBA1C MFR BLD: 7 %
HDLC SERPL-MCNC: 47 MG/DL (ref 40–60)
LDL CHOLESTEROL CALCULATED: 77 MG/DL
POTASSIUM SERPL-SCNC: 5 MMOL/L (ref 3.5–5.1)
SODIUM BLD-SCNC: 144 MMOL/L (ref 136–145)
T4 FREE: 1.1 NG/DL (ref 0.9–1.8)
TOTAL PROTEIN: 6.8 G/DL (ref 6.4–8.2)
TRIGL SERPL-MCNC: 105 MG/DL (ref 0–150)
TSH SERPL DL<=0.05 MIU/L-ACNC: 4.64 UIU/ML (ref 0.27–4.2)
VITAMIN B-12: 474 PG/ML (ref 211–911)
VLDLC SERPL CALC-MCNC: 21 MG/DL

## 2018-09-07 ENCOUNTER — OFFICE VISIT (OUTPATIENT)
Dept: FAMILY MEDICINE CLINIC | Age: 83
End: 2018-09-07

## 2018-09-07 VITALS
DIASTOLIC BLOOD PRESSURE: 80 MMHG | TEMPERATURE: 97.6 F | HEART RATE: 72 BPM | WEIGHT: 164 LBS | BODY MASS INDEX: 27.32 KG/M2 | SYSTOLIC BLOOD PRESSURE: 148 MMHG | HEIGHT: 65 IN

## 2018-09-07 DIAGNOSIS — E78.2 MIXED HYPERLIPIDEMIA: ICD-10-CM

## 2018-09-07 DIAGNOSIS — E11.22 TYPE 2 DIABETES MELLITUS WITH CHRONIC KIDNEY DISEASE, WITH LONG-TERM CURRENT USE OF INSULIN, UNSPECIFIED CKD STAGE (HCC): ICD-10-CM

## 2018-09-07 DIAGNOSIS — I10 ESSENTIAL HYPERTENSION, BENIGN: Primary | ICD-10-CM

## 2018-09-07 DIAGNOSIS — Z79.4 TYPE 2 DIABETES MELLITUS WITH CHRONIC KIDNEY DISEASE, WITH LONG-TERM CURRENT USE OF INSULIN, UNSPECIFIED CKD STAGE (HCC): ICD-10-CM

## 2018-09-07 PROCEDURE — 99214 OFFICE O/P EST MOD 30 MIN: CPT | Performed by: FAMILY MEDICINE

## 2018-09-07 RX ORDER — QUINAPRIL 40 MG/1
20 TABLET ORAL DAILY
Qty: 90 TABLET | Refills: 2
Start: 2018-09-07 | End: 2019-03-28 | Stop reason: SDUPTHER

## 2018-09-07 RX ORDER — AMLODIPINE BESYLATE 10 MG/1
10 TABLET ORAL DAILY
COMMUNITY
End: 2021-05-24 | Stop reason: DRUGHIGH

## 2018-09-07 ASSESSMENT — ENCOUNTER SYMPTOMS
ABDOMINAL PAIN: 0
VOMITING: 0
BLOOD IN STOOL: 0
DIARRHEA: 0
NAUSEA: 0
COUGH: 0
SHORTNESS OF BREATH: 0

## 2018-09-07 NOTE — PROGRESS NOTES
Subjective:      Patient ID: Kb Tolbert is a 81 y.o. male. Patient presents with:  3 Month Follow-Up: hypertension, thyroid, diabetes, lipids - discuss lab results    He is doing very well and denies c/o  No sores on the feet  Saw the foot doctor dr Vinny Veliz. Seeing every 3-4 months    YOB: 1935    Date of Visit:  9/7/2018     -- Penicillins -- Hives    Current Outpatient Prescriptions:  amLODIPine (NORVASC) 10 MG tablet, Take 10 mg by mouth daily, Disp: , Rfl:   quinapril (ACCUPRIL) 40 MG tablet, Take 0.5 tablets by mouth daily, Disp: 90 tablet, Rfl: 2  levothyroxine (SYNTHROID) 25 MCG tablet, TAKE 1 TABLET BY MOUTH DAILY, Disp: 30 tablet, Rfl: 0  clopidogrel (PLAVIX) 75 MG tablet, TAKE 1 TABLET BY MOUTH EVERY DAY, Disp: 90 tablet, Rfl: 0  LEVEMIR FLEXTOUCH 100 UNIT/ML injection pen, INJECT 10 UNITS INTO THE SKIN EVERY EVENING, Disp: 15 mL, Rfl: 1  atorvastatin (LIPITOR) 40 MG tablet, TAKE 1 TABLET BY MOUTH EVERY DAY, Disp: 90 tablet, Rfl: 1  insulin lispro (HUMALOG KWIKPEN) 100 UNIT/ML pen, Inject 10 Units into the skin 3 times daily (before meals), Disp: 45 mL, Rfl: 3  SOFT TOUCH LANCETS MISC, 1 each by Does not apply route 3 times daily (before meals), Disp: 270 each, Rfl: 3  Blood Glucose Monitoring Suppl (ACCU-CHEK MARTHA) KENJI, 1 each by Does not apply route 3 times daily (before meals), Disp: 1 Device, Rfl: 0  glucose blood VI test strips (ACCU-CHEK MARTHA PLUS) strip, 1 each by In Vitro route 3 times daily As needed. , Disp: 270 each, Rfl: 3  Insulin Pen Needle 31G X 6 MM MISC, 1 each by Does not apply route 3 times daily Test TID DX E11.29, Disp: 100 each, Rfl: 6  Alcohol Swabs (PHARMACIST CHOICE ALCOHOL) PADS, USE THREE TIMES DAILY, Disp: 300 each, Rfl: PRN  Multiple Vitamins-Minerals (THERAPEUTIC MULTIVITAMIN-MINERALS) tablet, Take 1 tablet by mouth daily. , Disp: , Rfl:   docusate sodium (COLACE) 100 MG capsule, Take 100 mg by mouth 2 times daily as needed. , Disp: , Rfl:   nadolol (CORGARD) 40 MG tablet, Take 40 mg by mouth daily. , Disp: , Rfl:     No current facility-administered medications for this visit.       ---------------------------------------------------------               09/07/18 09/07/18 09/07/18                    0854            0859          0924       ---------------------------------------------------------   BP:        (!) 164/74      (!) 164/74    (!) 148/80     Site:    Left Upper Arm  Left Upper ArmLeft Upper Arm   Position:     Sitting        Sitting                     Cuff Size:  Medium Adult    Medium Adult  Medium Adult    Pulse:                                       72         Temp:   97.6 °F (36.4 °C)                               TempSrc:      Oral                                      Weight: 164 lb (74.4 kg)                                Height:  5' 5\" (1.651 m)                               ---------------------------------------------------------  Body mass index is 27.29 kg/m². Wt Readings from Last 3 Encounters:  09/07/18 : 164 lb (74.4 kg)  07/24/18 : 164 lb (74.4 kg)  05/07/18 : 172 lb 6.4 oz (78.2 kg)    BP Readings from Last 3 Encounters:  09/07/18 : (!) 148/80  07/24/18 : (!) 188/77  05/07/18 : 138/80          Review of Systems   Constitutional: Negative for chills and fever. Respiratory: Negative for cough and shortness of breath. Cardiovascular: Negative for chest pain. Gastrointestinal: Negative for abdominal pain, blood in stool, diarrhea, nausea and vomiting. Genitourinary: Negative for difficulty urinating. Musculoskeletal:        No foot sores   Neurological: Negative for headaches. Objective:   Physical Exam   Constitutional: He appears well-developed and well-nourished. No distress. Cardiovascular: Normal rate, regular rhythm and normal heart sounds. Exam reveals no gallop and no friction rub. No murmur heard.        Pulmonary/Chest: Effort normal and breath sounds normal. No accessory muscle usage. No tachypnea. No respiratory distress. He has no decreased breath sounds. He has no wheezes. He has no rhonchi. He has no rales. Abdominal: Soft. Normal appearance and bowel sounds are normal. He exhibits no distension, no abdominal bruit and no mass. There is no hepatosplenomegaly. There is no tenderness. There is no guarding. Lymphadenopathy:     He has no cervical adenopathy. Right: No supraclavicular adenopathy present. Left: No supraclavicular adenopathy present. Neurological: He is alert. Skin: Skin is warm, dry and intact. He is not diaphoretic. No cyanosis. No pallor. Nails show no clubbing. Psychiatric: He has a normal mood and affect. Assessment:       Diagnosis Orders   1. Essential hypertension, benign     2. Mixed hyperlipidemia     3. Type 2 diabetes mellitus with chronic kidney disease, with long-term current use of insulin, unspecified CKD stage ([address]        reviewed the labs  Glucose is dropping.  Will change insulin      Plan:      Continue to follow up with dr Marsa Severin every 3 months  Stay with the diet  Cut the levemir insulin out for now  See the renal medicine doctors here in [place]  Consider getting the new shingle vaccine called Shingrix  See me in 3-4 months        Jerzy Gil MD

## 2018-09-24 RX ORDER — ATORVASTATIN CALCIUM 40 MG/1
TABLET, FILM COATED ORAL
Qty: 90 TABLET | Refills: 1 | Status: SHIPPED | OUTPATIENT
Start: 2018-09-24 | End: 2019-03-23 | Stop reason: SDUPTHER

## 2018-12-28 ENCOUNTER — HOSPITAL ENCOUNTER (OUTPATIENT)
Dept: ULTRASOUND IMAGING | Age: 83
Discharge: HOME | End: 2018-12-28
Payer: MEDICARE

## 2018-12-28 DIAGNOSIS — N18.4 STAGE 4 CHRONIC KIDNEY DISEASE (HCC): ICD-10-CM

## 2018-12-28 LAB
ANION GAP SERPL CALCULATED.3IONS-SCNC: 12 MMOL/L (ref 3–16)
BASOPHILS ABSOLUTE: 0.1 K/UL (ref 0–0.2)
BASOPHILS RELATIVE PERCENT: 1.5 %
BILIRUBIN URINE: NEGATIVE
BLOOD, URINE: ABNORMAL
BUN BLDV-MCNC: 47 MG/DL (ref 7–20)
CALCIUM SERPL-MCNC: 9.5 MG/DL (ref 8.3–10.6)
CHLORIDE BLD-SCNC: 104 MMOL/L (ref 99–110)
CLARITY: CLEAR
CO2: 24 MMOL/L (ref 21–32)
COLOR: YELLOW
CREAT SERPL-MCNC: 2.1 MG/DL (ref 0.8–1.3)
EOSINOPHILS ABSOLUTE: 0.3 K/UL (ref 0–0.6)
EOSINOPHILS RELATIVE PERCENT: 3.5 %
EPITHELIAL CELLS, UA: 0 /HPF (ref 0–5)
GFR AFRICAN AMERICAN: 37
GFR NON-AFRICAN AMERICAN: 30
GLUCOSE BLD-MCNC: 141 MG/DL (ref 70–99)
GLUCOSE URINE: NEGATIVE MG/DL
HCT VFR BLD CALC: 43.5 % (ref 40.5–52.5)
HEMOGLOBIN: 14.5 G/DL (ref 13.5–17.5)
HYALINE CASTS: 0 /LPF (ref 0–8)
KETONES, URINE: NEGATIVE MG/DL
LEUKOCYTE ESTERASE, URINE: NEGATIVE
LYMPHOCYTES ABSOLUTE: 1.9 K/UL (ref 1–5.1)
LYMPHOCYTES RELATIVE PERCENT: 19.5 %
MCH RBC QN AUTO: 30.9 PG (ref 26–34)
MCHC RBC AUTO-ENTMCNC: 33.3 G/DL (ref 31–36)
MCV RBC AUTO: 92.9 FL (ref 80–100)
MICROSCOPIC EXAMINATION: YES
MONOCYTES ABSOLUTE: 0.8 K/UL (ref 0–1.3)
MONOCYTES RELATIVE PERCENT: 8.4 %
NEUTROPHILS ABSOLUTE: 6.4 K/UL (ref 1.7–7.7)
NEUTROPHILS RELATIVE PERCENT: 67.1 %
NITRITE, URINE: NEGATIVE
PARATHYROID HORMONE INTACT: 95.5 PG/ML (ref 14–72)
PDW BLD-RTO: 14.8 % (ref 12.4–15.4)
PH UA: 6
PLATELET # BLD: 225 K/UL (ref 135–450)
PMV BLD AUTO: 8.4 FL (ref 5–10.5)
POTASSIUM SERPL-SCNC: 5.1 MMOL/L (ref 3.5–5.1)
PROTEIN UA: >=300 MG/DL
RBC # BLD: 4.68 M/UL (ref 4.2–5.9)
RBC UA: 1 /HPF (ref 0–4)
SODIUM BLD-SCNC: 140 MMOL/L (ref 136–145)
SPECIFIC GRAVITY UA: 1.01
URINE REFLEX TO CULTURE: ABNORMAL
URINE TYPE: ABNORMAL
UROBILINOGEN, URINE: 0.2 E.U./DL
VITAMIN D 25-HYDROXY: 14.8 NG/ML
WBC # BLD: 9.6 K/UL (ref 4–11)
WBC UA: 1 /HPF (ref 0–5)

## 2018-12-28 PROCEDURE — 80048 BASIC METABOLIC PNL TOTAL CA: CPT

## 2018-12-28 PROCEDURE — 81001 URINALYSIS AUTO W/SCOPE: CPT

## 2018-12-28 PROCEDURE — 36415 COLL VENOUS BLD VENIPUNCTURE: CPT

## 2018-12-28 PROCEDURE — 76770 US EXAM ABDO BACK WALL COMP: CPT

## 2018-12-28 PROCEDURE — 83970 ASSAY OF PARATHORMONE: CPT

## 2018-12-28 PROCEDURE — 82306 VITAMIN D 25 HYDROXY: CPT

## 2018-12-28 PROCEDURE — 85025 COMPLETE CBC W/AUTO DIFF WBC: CPT

## 2019-01-07 ENCOUNTER — OFFICE VISIT (OUTPATIENT)
Dept: FAMILY MEDICINE CLINIC | Age: 84
End: 2019-01-07
Payer: MEDICARE

## 2019-01-07 VITALS
WEIGHT: 166.2 LBS | TEMPERATURE: 97.6 F | SYSTOLIC BLOOD PRESSURE: 138 MMHG | HEART RATE: 76 BPM | DIASTOLIC BLOOD PRESSURE: 70 MMHG | BODY MASS INDEX: 27.69 KG/M2 | HEIGHT: 65 IN

## 2019-01-07 DIAGNOSIS — Z23 NEEDS FLU SHOT: ICD-10-CM

## 2019-01-07 DIAGNOSIS — Z79.4 TYPE 2 DIABETES MELLITUS WITH CHRONIC KIDNEY DISEASE, WITH LONG-TERM CURRENT USE OF INSULIN, UNSPECIFIED CKD STAGE (HCC): Primary | ICD-10-CM

## 2019-01-07 DIAGNOSIS — E11.22 TYPE 2 DIABETES MELLITUS WITH CHRONIC KIDNEY DISEASE, WITH LONG-TERM CURRENT USE OF INSULIN, UNSPECIFIED CKD STAGE (HCC): Primary | ICD-10-CM

## 2019-01-07 DIAGNOSIS — E03.9 ACQUIRED HYPOTHYROIDISM: ICD-10-CM

## 2019-01-07 PROCEDURE — G0008 ADMIN INFLUENZA VIRUS VAC: HCPCS | Performed by: FAMILY MEDICINE

## 2019-01-07 PROCEDURE — 99213 OFFICE O/P EST LOW 20 MIN: CPT | Performed by: FAMILY MEDICINE

## 2019-01-07 PROCEDURE — 90662 IIV NO PRSV INCREASED AG IM: CPT | Performed by: FAMILY MEDICINE

## 2019-01-07 ASSESSMENT — PATIENT HEALTH QUESTIONNAIRE - PHQ9
1. LITTLE INTEREST OR PLEASURE IN DOING THINGS: 0
SUM OF ALL RESPONSES TO PHQ QUESTIONS 1-9: 0
SUM OF ALL RESPONSES TO PHQ QUESTIONS 1-9: 0
2. FEELING DOWN, DEPRESSED OR HOPELESS: 0
SUM OF ALL RESPONSES TO PHQ9 QUESTIONS 1 & 2: 0

## 2019-01-24 RX ORDER — INSULIN LISPRO 100 [IU]/ML
INJECTION, SOLUTION INTRAVENOUS; SUBCUTANEOUS
Qty: 45 ML | Refills: 5 | Status: SHIPPED | OUTPATIENT
Start: 2019-01-24 | End: 2019-01-31 | Stop reason: SDUPTHER

## 2019-01-24 RX ORDER — LEVOTHYROXINE SODIUM 0.03 MG/1
TABLET ORAL
Qty: 30 TABLET | Refills: 5 | Status: SHIPPED | OUTPATIENT
Start: 2019-01-24 | End: 2019-05-07

## 2019-01-29 ENCOUNTER — PROCEDURE VISIT (OUTPATIENT)
Dept: SURGERY | Age: 84
End: 2019-01-29
Payer: MEDICARE

## 2019-01-29 DIAGNOSIS — I70.213 ATHEROSCLEROSIS OF NATIVE ARTERY OF BOTH LOWER EXTREMITIES WITH INTERMITTENT CLAUDICATION (HCC): ICD-10-CM

## 2019-01-29 DIAGNOSIS — I73.9 PAD (PERIPHERAL ARTERY DISEASE) (HCC): ICD-10-CM

## 2019-01-29 PROCEDURE — 93925 LOWER EXTREMITY STUDY: CPT | Performed by: SURGERY

## 2019-01-31 DIAGNOSIS — Z79.4 TYPE 2 DIABETES MELLITUS WITH CHRONIC KIDNEY DISEASE, WITH LONG-TERM CURRENT USE OF INSULIN, UNSPECIFIED CKD STAGE (HCC): ICD-10-CM

## 2019-01-31 DIAGNOSIS — E11.22 TYPE 2 DIABETES MELLITUS WITH CHRONIC KIDNEY DISEASE, WITH LONG-TERM CURRENT USE OF INSULIN, UNSPECIFIED CKD STAGE (HCC): ICD-10-CM

## 2019-01-31 DIAGNOSIS — E03.9 ACQUIRED HYPOTHYROIDISM: ICD-10-CM

## 2019-01-31 LAB
ESTIMATED AVERAGE GLUCOSE: 137 MG/DL
GLUCOSE BLD-MCNC: 135 MG/DL (ref 70–99)
HBA1C MFR BLD: 6.4 %
T4 FREE: 1 NG/DL (ref 0.9–1.8)
TSH SERPL DL<=0.05 MIU/L-ACNC: 4.35 UIU/ML (ref 0.27–4.2)

## 2019-02-05 ENCOUNTER — TELEPHONE (OUTPATIENT)
Dept: SURGERY | Age: 84
End: 2019-02-05

## 2019-02-26 RX ORDER — LEVOTHYROXINE SODIUM 0.03 MG/1
TABLET ORAL
Qty: 30 TABLET | Refills: 3 | Status: SHIPPED | OUTPATIENT
Start: 2019-02-26 | End: 2019-05-31 | Stop reason: SDUPTHER

## 2019-03-28 RX ORDER — QUINAPRIL 40 MG/1
20 TABLET ORAL DAILY
Qty: 90 TABLET | Refills: 2 | Status: SHIPPED | OUTPATIENT
Start: 2019-03-28 | End: 2020-09-17

## 2019-05-07 ENCOUNTER — OFFICE VISIT (OUTPATIENT)
Dept: FAMILY MEDICINE CLINIC | Age: 84
End: 2019-05-07
Payer: MEDICARE

## 2019-05-07 VITALS
DIASTOLIC BLOOD PRESSURE: 78 MMHG | HEIGHT: 65 IN | TEMPERATURE: 97.4 F | WEIGHT: 172.8 LBS | SYSTOLIC BLOOD PRESSURE: 130 MMHG | BODY MASS INDEX: 28.79 KG/M2 | HEART RATE: 72 BPM

## 2019-05-07 DIAGNOSIS — I10 ESSENTIAL HYPERTENSION, BENIGN: ICD-10-CM

## 2019-05-07 DIAGNOSIS — E11.22 TYPE 2 DIABETES MELLITUS WITH CHRONIC KIDNEY DISEASE, WITH LONG-TERM CURRENT USE OF INSULIN, UNSPECIFIED CKD STAGE (HCC): Primary | ICD-10-CM

## 2019-05-07 DIAGNOSIS — Z79.4 TYPE 2 DIABETES MELLITUS WITH CHRONIC KIDNEY DISEASE, WITH LONG-TERM CURRENT USE OF INSULIN, UNSPECIFIED CKD STAGE (HCC): Primary | ICD-10-CM

## 2019-05-07 PROCEDURE — 99213 OFFICE O/P EST LOW 20 MIN: CPT | Performed by: FAMILY MEDICINE

## 2019-05-31 RX ORDER — LEVOTHYROXINE SODIUM 0.03 MG/1
TABLET ORAL
Qty: 30 TABLET | Refills: 5 | Status: SHIPPED | OUTPATIENT
Start: 2019-05-31 | End: 2019-06-25

## 2019-06-14 ENCOUNTER — TELEPHONE (OUTPATIENT)
Dept: FAMILY MEDICINE CLINIC | Age: 84
End: 2019-06-14

## 2019-06-25 RX ORDER — LEVOTHYROXINE SODIUM 0.03 MG/1
TABLET ORAL
Qty: 30 TABLET | Refills: 3 | Status: SHIPPED | OUTPATIENT
Start: 2019-06-25 | End: 2019-09-20 | Stop reason: SDUPTHER

## 2019-07-15 ENCOUNTER — TELEPHONE (OUTPATIENT)
Dept: FAMILY MEDICINE CLINIC | Age: 84
End: 2019-07-15

## 2019-07-15 RX ORDER — FLASH GLUCOSE SENSOR
KIT MISCELLANEOUS
Qty: 1 EACH | Refills: 0 | Status: SHIPPED | OUTPATIENT
Start: 2019-07-15

## 2019-07-17 RX ORDER — ATORVASTATIN CALCIUM 40 MG/1
TABLET, FILM COATED ORAL
Qty: 90 TABLET | Refills: 1 | Status: SHIPPED | OUTPATIENT
Start: 2019-07-17 | End: 2020-03-23

## 2019-07-25 RX ORDER — CLOPIDOGREL BISULFATE 75 MG/1
TABLET ORAL
Qty: 90 TABLET | Refills: 1 | Status: SHIPPED | OUTPATIENT
Start: 2019-07-25 | End: 2019-11-08 | Stop reason: SDUPTHER

## 2019-08-02 DIAGNOSIS — Z79.4 TYPE 2 DIABETES MELLITUS WITH CHRONIC KIDNEY DISEASE, WITH LONG-TERM CURRENT USE OF INSULIN, UNSPECIFIED CKD STAGE (HCC): ICD-10-CM

## 2019-08-02 DIAGNOSIS — E11.22 TYPE 2 DIABETES MELLITUS WITH CHRONIC KIDNEY DISEASE, WITH LONG-TERM CURRENT USE OF INSULIN, UNSPECIFIED CKD STAGE (HCC): ICD-10-CM

## 2019-08-02 LAB — GLUCOSE BLD-MCNC: 162 MG/DL (ref 70–99)

## 2019-08-03 LAB
ESTIMATED AVERAGE GLUCOSE: 171.4 MG/DL
HBA1C MFR BLD: 7.6 %

## 2019-08-06 ENCOUNTER — OFFICE VISIT (OUTPATIENT)
Dept: SURGERY | Age: 84
End: 2019-08-06
Payer: MEDICARE

## 2019-08-06 ENCOUNTER — OFFICE VISIT (OUTPATIENT)
Dept: FAMILY MEDICINE CLINIC | Age: 84
End: 2019-08-06
Payer: MEDICARE

## 2019-08-06 ENCOUNTER — PROCEDURE VISIT (OUTPATIENT)
Dept: SURGERY | Age: 84
End: 2019-08-06
Payer: MEDICARE

## 2019-08-06 VITALS
WEIGHT: 159 LBS | HEART RATE: 62 BPM | SYSTOLIC BLOOD PRESSURE: 172 MMHG | BODY MASS INDEX: 26.46 KG/M2 | DIASTOLIC BLOOD PRESSURE: 66 MMHG

## 2019-08-06 VITALS
HEIGHT: 65 IN | WEIGHT: 158 LBS | TEMPERATURE: 97.5 F | HEART RATE: 72 BPM | SYSTOLIC BLOOD PRESSURE: 132 MMHG | BODY MASS INDEX: 26.33 KG/M2 | DIASTOLIC BLOOD PRESSURE: 70 MMHG

## 2019-08-06 DIAGNOSIS — I10 ESSENTIAL HYPERTENSION, BENIGN: ICD-10-CM

## 2019-08-06 DIAGNOSIS — E11.22 TYPE 2 DIABETES MELLITUS WITH CHRONIC KIDNEY DISEASE, WITH LONG-TERM CURRENT USE OF INSULIN, UNSPECIFIED CKD STAGE (HCC): ICD-10-CM

## 2019-08-06 DIAGNOSIS — I73.9 PAD (PERIPHERAL ARTERY DISEASE) (HCC): Primary | ICD-10-CM

## 2019-08-06 DIAGNOSIS — Z85.46 HISTORY OF PROSTATE CANCER: ICD-10-CM

## 2019-08-06 DIAGNOSIS — E78.2 MIXED HYPERLIPIDEMIA: ICD-10-CM

## 2019-08-06 DIAGNOSIS — I70.213 ATHEROSCLEROSIS OF NATIVE ARTERY OF BOTH LOWER EXTREMITIES WITH INTERMITTENT CLAUDICATION (HCC): ICD-10-CM

## 2019-08-06 DIAGNOSIS — I25.10 ASCVD (ARTERIOSCLEROTIC CARDIOVASCULAR DISEASE): ICD-10-CM

## 2019-08-06 DIAGNOSIS — Z79.4 TYPE 2 DIABETES MELLITUS WITH CHRONIC KIDNEY DISEASE, WITH LONG-TERM CURRENT USE OF INSULIN, UNSPECIFIED CKD STAGE (HCC): ICD-10-CM

## 2019-08-06 DIAGNOSIS — I10 ESSENTIAL HYPERTENSION, BENIGN: Primary | ICD-10-CM

## 2019-08-06 LAB — PROSTATE SPECIFIC ANTIGEN: 5.75 NG/ML (ref 0–4)

## 2019-08-06 PROCEDURE — 99213 OFFICE O/P EST LOW 20 MIN: CPT | Performed by: SURGERY

## 2019-08-06 PROCEDURE — 99213 OFFICE O/P EST LOW 20 MIN: CPT | Performed by: FAMILY MEDICINE

## 2019-08-06 PROCEDURE — 93925 LOWER EXTREMITY STUDY: CPT | Performed by: SURGERY

## 2019-08-06 RX ORDER — TORSEMIDE 10 MG/1
10 TABLET ORAL 2 TIMES DAILY
COMMUNITY
End: 2021-05-24 | Stop reason: DRUGHIGH

## 2019-08-06 ASSESSMENT — ENCOUNTER SYMPTOMS
ALLERGIC/IMMUNOLOGIC NEGATIVE: 1
BLOOD IN STOOL: 0
CHEST TIGHTNESS: 0
SHORTNESS OF BREATH: 0
NAUSEA: 0
VOMITING: 0
COUGH: 0
RESPIRATORY NEGATIVE: 1
GASTROINTESTINAL NEGATIVE: 1
ABDOMINAL PAIN: 0
ABDOMINAL DISTENTION: 0
CONSTIPATION: 0
EYES NEGATIVE: 1
DIARRHEA: 0

## 2019-08-06 NOTE — PROGRESS NOTES
Subjective:      Patient ID: Reena Giron is a 81 y.o. male. Chief Complaint   Patient presents with    Circulatory Problem     6 month f/u for ADS and office visit. Leg Pain    The incident occurred more than 1 week ago. The incident occurred at home. The injury mechanism is unknown. The pain is present in the left leg and right leg. The pain is at a severity of 0/10. The patient is experiencing no pain. The pain has been improving since onset. Pertinent negatives include no inability to bear weight. It is unknown if a foreign body is present. The symptoms are aggravated by movement and weight bearing. The treatment provided significant relief. History reviewed. No pertinent family history. Past Medical History:   Diagnosis Date    Arthritis     Circulation problem     [facility]     Gangrene of toe ([facility] [address]     Hyperlipidemia     Hypertension     Toe amputation status ([facility] [address] 4/5/2013. left foot    Wears glasses     reading     Past Surgical History:   Procedure Laterality Date    CARDIAC CATHETERIZATION      COLONOSCOPY  9/20/11    polyp, dr Katia Landers, check in 5 years    COLONOSCOPY  04/20/2017    polyp dr Katia Landers. removed. no further checks    FOOT AMPUTATION  9/16/13     left transmetatarsal amputation    HERNIA REPAIR      inguinal    ND AMPUTATION TOE,MT-P JT  Right 2nd and 3rd toe    Amputation, Toe    SKIN SPLIT GRAFT  9/16/13    left thigh    TOE [facility]UT[facility]  [facility]/4/2013    [facility]    TOE AMPUTATION  9/16/13    right 2nd toe amp     Social History     Socioeconomic History    Marital status:       Spouse name: Not on file    Number of children: Not on file    Years of education: Not on file    Highest education level: Not on file   Occupational History    Occupation: [employer]   Social Needs    Financial resource strain: Not on file    Food insecurity:     Worry: Not on file     Inability: Not on file   Vend needs:     Medical: Not on file and it is both accurate and complete.

## 2019-08-06 NOTE — PROGRESS NOTES
Subjective:      Patient ID: Roland Giles is a 81 y.o. male. Patient presents with:  3 Month Follow-Up: diabetes, lipids, hypertension - discuss lab results    He is doing very well  He has no c/o  He is on the same meds    He is seeing vascular Dr Jazzy Hernandez today for check up  He just saw the renal doctor in July and was placed on torsemide 10 mg bid     He still see's dr Marily Abbott    YOB: 1935    Date of Visit:  8/6/2019     -- Penicillins -- Hives    Current Outpatient Medications:  clopidogrel (PLAVIX) 75 MG tablet, TAKE 1 TABLET BY MOUTH EVERY DAY, Disp: 90 tablet, Rfl: 1  atorvastatin (LIPITOR) 40 MG tablet, TAKE 1 TABLET BY MOUTH EVERY DAY, Disp: 90 tablet, Rfl: 1  Continuous Blood Gluc Sensor (FREESTYLE KAREN SENSOR SYSTEM) MISC, E11.29, Disp: 1 each, Rfl: 0  levothyroxine (SYNTHROID) 25 MCG tablet, TAKE 1 TABLET BY MOUTH DAILY, Disp: 30 tablet, Rfl: 3  quinapril (ACCUPRIL) 40 MG tablet, Take 0.5 tablets by mouth daily, Disp: 90 tablet, Rfl: 2  insulin lispro (HUMALOG KWIKPEN) 100 UNIT/ML pen, Inject 5 Units into the skin 3 times daily (before meals), Disp: 45 mL, Rfl: 5  Insulin Pen Needle 31G X 6 MM MISC, 1 each by Does not apply route 3 times daily Test TID DX E11.29, Disp: 100 each, Rfl: 6  amLODIPine (NORVASC) 10 MG tablet, Take 10 mg by mouth daily, Disp: , Rfl:   SOFT TOUCH LANCETS MISC, 1 each by Does not apply route 3 times daily (before meals), Disp: 270 each, Rfl: 3  Blood Glucose Monitoring Suppl (ACCU-CHEK MARTHA) KENJI, 1 each by Does not apply route 3 times daily (before meals), Disp: 1 Device, Rfl: 0  glucose blood VI test strips (ACCU-CHEK MARTHA PLUS) strip, 1 each by In Vitro route 3 times daily As needed. , Disp: 270 each, Rfl: 3  Alcohol Swabs (PHARMACIST CHOICE ALCOHOL) PADS, USE THREE TIMES DAILY, Disp: 300 each, Rfl: PRN  Multiple Vitamins-Minerals (THERAPEUTIC MULTIVITAMIN-MINERALS) tablet, Take 1 tablet by mouth daily. , Disp: , Rfl:   docusate sodium (COLACE) 100 MG

## 2019-09-20 RX ORDER — LEVOTHYROXINE SODIUM 0.03 MG/1
TABLET ORAL
Qty: 90 TABLET | Refills: 1 | Status: SHIPPED | OUTPATIENT
Start: 2019-09-20 | End: 2020-06-22 | Stop reason: SDUPTHER

## 2019-11-09 RX ORDER — CLOPIDOGREL BISULFATE 75 MG/1
TABLET ORAL
Qty: 90 TABLET | Refills: 1 | Status: SHIPPED | OUTPATIENT
Start: 2019-11-09 | End: 2019-11-18 | Stop reason: SDUPTHER

## 2019-11-18 RX ORDER — CLOPIDOGREL BISULFATE 75 MG/1
75 TABLET ORAL DAILY
Qty: 90 TABLET | Refills: 1 | Status: SHIPPED | OUTPATIENT
Start: 2019-11-18 | End: 2020-06-25

## 2019-12-06 ENCOUNTER — OFFICE VISIT (OUTPATIENT)
Dept: FAMILY MEDICINE CLINIC | Age: 84
End: 2019-12-06
Payer: MEDICARE

## 2019-12-06 VITALS
WEIGHT: 159.8 LBS | SYSTOLIC BLOOD PRESSURE: 130 MMHG | HEIGHT: 65 IN | BODY MASS INDEX: 26.62 KG/M2 | DIASTOLIC BLOOD PRESSURE: 84 MMHG | HEART RATE: 68 BPM | TEMPERATURE: 97.6 F

## 2019-12-06 DIAGNOSIS — E03.9 ACQUIRED HYPOTHYROIDISM: ICD-10-CM

## 2019-12-06 DIAGNOSIS — Z23 NEEDS FLU SHOT: ICD-10-CM

## 2019-12-06 DIAGNOSIS — E11.22 TYPE 2 DIABETES MELLITUS WITH CHRONIC KIDNEY DISEASE, WITH LONG-TERM CURRENT USE OF INSULIN, UNSPECIFIED CKD STAGE (HCC): ICD-10-CM

## 2019-12-06 DIAGNOSIS — E78.2 MIXED HYPERLIPIDEMIA: ICD-10-CM

## 2019-12-06 DIAGNOSIS — Z79.4 TYPE 2 DIABETES MELLITUS WITH CHRONIC KIDNEY DISEASE, WITH LONG-TERM CURRENT USE OF INSULIN, UNSPECIFIED CKD STAGE (HCC): ICD-10-CM

## 2019-12-06 DIAGNOSIS — I10 ESSENTIAL HYPERTENSION, BENIGN: Primary | ICD-10-CM

## 2019-12-06 PROCEDURE — G0008 ADMIN INFLUENZA VIRUS VAC: HCPCS | Performed by: FAMILY MEDICINE

## 2019-12-06 PROCEDURE — 99214 OFFICE O/P EST MOD 30 MIN: CPT | Performed by: FAMILY MEDICINE

## 2019-12-06 PROCEDURE — 90653 IIV ADJUVANT VACCINE IM: CPT | Performed by: FAMILY MEDICINE

## 2019-12-06 ASSESSMENT — ENCOUNTER SYMPTOMS
DIARRHEA: 0
COUGH: 0
CHEST TIGHTNESS: 0
NAUSEA: 0
BLOOD IN STOOL: 0
VOMITING: 0
ABDOMINAL PAIN: 0
SHORTNESS OF BREATH: 0
ABDOMINAL DISTENTION: 0
CONSTIPATION: 0

## 2020-01-17 DIAGNOSIS — E78.2 MIXED HYPERLIPIDEMIA: ICD-10-CM

## 2020-01-17 DIAGNOSIS — E03.9 ACQUIRED HYPOTHYROIDISM: ICD-10-CM

## 2020-01-17 DIAGNOSIS — I10 ESSENTIAL HYPERTENSION, BENIGN: ICD-10-CM

## 2020-01-17 DIAGNOSIS — Z79.4 TYPE 2 DIABETES MELLITUS WITH CHRONIC KIDNEY DISEASE, WITH LONG-TERM CURRENT USE OF INSULIN, UNSPECIFIED CKD STAGE (HCC): ICD-10-CM

## 2020-01-17 DIAGNOSIS — E11.22 TYPE 2 DIABETES MELLITUS WITH CHRONIC KIDNEY DISEASE, WITH LONG-TERM CURRENT USE OF INSULIN, UNSPECIFIED CKD STAGE (HCC): ICD-10-CM

## 2020-01-17 LAB
ALBUMIN SERPL-MCNC: 3.6 G/DL (ref 3.4–5)
ALP BLD-CCNC: 94 U/L (ref 40–129)
ALT SERPL-CCNC: <5 U/L (ref 10–40)
AST SERPL-CCNC: 15 U/L (ref 15–37)
BILIRUB SERPL-MCNC: 0.4 MG/DL (ref 0–1)
BILIRUBIN DIRECT: <0.2 MG/DL (ref 0–0.3)
BILIRUBIN, INDIRECT: ABNORMAL MG/DL (ref 0–1)
CHOLESTEROL, TOTAL: 133 MG/DL (ref 0–199)
GLUCOSE BLD-MCNC: 164 MG/DL (ref 70–99)
HDLC SERPL-MCNC: 51 MG/DL (ref 40–60)
LDL CHOLESTEROL CALCULATED: 64 MG/DL
T4 FREE: 1.2 NG/DL (ref 0.9–1.8)
TOTAL PROTEIN: 7.6 G/DL (ref 6.4–8.2)
TRIGL SERPL-MCNC: 92 MG/DL (ref 0–150)
TSH SERPL DL<=0.05 MIU/L-ACNC: 4.16 UIU/ML (ref 0.27–4.2)
VLDLC SERPL CALC-MCNC: 18 MG/DL

## 2020-01-18 LAB
ESTIMATED AVERAGE GLUCOSE: 165.7 MG/DL
HBA1C MFR BLD: 7.4 %

## 2020-02-03 RX ORDER — INSULIN LISPRO 100 [IU]/ML
INJECTION, SOLUTION INTRAVENOUS; SUBCUTANEOUS
Qty: 45 ML | Refills: 5 | Status: SHIPPED | OUTPATIENT
Start: 2020-02-03 | End: 2021-02-01 | Stop reason: SDUPTHER

## 2020-02-10 LAB — PROSTATE SPECIFIC ANTIGEN: 5.35 NG/ML (ref 0–4)

## 2020-03-04 ENCOUNTER — PROCEDURE VISIT (OUTPATIENT)
Dept: SURGERY | Age: 85
End: 2020-03-04
Payer: MEDICARE

## 2020-03-04 ENCOUNTER — OFFICE VISIT (OUTPATIENT)
Dept: SURGERY | Age: 85
End: 2020-03-04
Payer: MEDICARE

## 2020-03-04 VITALS — BODY MASS INDEX: 27.12 KG/M2 | SYSTOLIC BLOOD PRESSURE: 168 MMHG | WEIGHT: 163 LBS | DIASTOLIC BLOOD PRESSURE: 80 MMHG

## 2020-03-04 PROCEDURE — 99214 OFFICE O/P EST MOD 30 MIN: CPT | Performed by: NURSE PRACTITIONER

## 2020-03-04 PROCEDURE — 93925 LOWER EXTREMITY STUDY: CPT | Performed by: SURGERY

## 2020-03-04 NOTE — PROGRESS NOTES
Subjective:      Patient ID: Yanique Hemphill is a 81 y.o. male. Chief Complaint   Patient presents with    Circulatory Problem     Patient here for 6 month follow up for ADS with GAURAV's & Office Visit. Pain Assessment  The patient is currently not experiencing any pain at this time. MARCIA Edwards is a 81 y.o. male who presents with a complaint of intermittent claudication. The problem is located in the bilateral LE's. Date last seen was 8/6/19. The symptoms first began year(s) ago. The patient has not experienced any new symptoms since last visit. The patient denies s/s of claudication in either leg. He does not walk much, but does walk out  to his barn daily. There have been no new developments in patient's medical status. Since last visit the patient has had a bilateral LE arterial scan today with GAURAV's. The patient has had hx of right 2nd and 3rd toe amp, hx of left TMA. Endovascular procedures include right SFA & popliteal artery stents, angioplasty tibioperoneal trunk & peroneal artery 10/23/13, left SFA and popliteal artery stents, angioplasty peroneal artery 1/28/14, left SFA, popliteal, tibioperoneal trunk & peroneal artery atherectomy w/angioplasty 7/23/14 and angioplasty left EIA, SFA and tibioperoneal trunk 1/12/17. Review of Systems   Constitutional: Negative for chills and fever. Cardiovascular: Positive for leg swelling. Skin: Negative for wound. All other systems reviewed and are negative. Allergies   Allergen Reactions    Penicillins Hives       Prior to Visit Medications    Medication Sig Taking?  Authorizing Provider   HUMALOG KWIKPEN 100 UNIT/ML SOPN INJECT 10 UNITS INTO THE SKIN THREE TIMES DAILY BEFORE MEALS Yes Gloria Scott MD   clopidogrel (PLAVIX) 75 MG tablet Take 1 tablet by mouth daily Yes Gloria Scott MD   levothyroxine (SYNTHROID) 25 MCG tablet TAKE 1 TABLET BY MOUTH DAILY Yes Gloria Scott MD   torsemide (DEMADEX) 10 MG tablet Take 10 mg by mouth 2 times daily Yes Historical Provider, MD   atorvastatin (LIPITOR) 40 MG tablet TAKE 1 TABLET BY MOUTH EVERY DAY Yes Orlando Rodrigez MD   Continuous Blood Gluc Sensor ([address] [place] [address]) MISC E11.29 Yes Orlando Rodrigez MD   quinapril (ACCUPRIL) 40 MG tablet Take 0.5 tablets by mouth daily Yes Orlando Rodrigez MD   insulin lispro (HUMALOG KWIKPEN) 100 UNIT/ML pen Inject 5 Units into the skin 3 times daily (before meals) Yes Orlando Rodrigez MD   Insulin Pen Needle 31G X 6 MM MISC 1 each by Does not apply route 3 times daily Test TID DX E11.29 Yes Orlando Rodrigez MD   amLODIPine (NORVASC) 10 MG tablet Take 10 mg by mouth daily Yes Rhona Saint, MD   SOFT TOUCH LANCETS MISC 1 each by Does not apply route 3 times daily (before meals) Yes Orlando Rodrigez MD   Blood Glucose Monitoring Suppl ([address] [place] [address]) KENJI 1 each by Does not apply route 3 times daily (before meals) Yes Orlando Rodrigez MD   glucose blood VI test strips (ACCU-CHEK MARTHA PLUS) strip 1 each by In Vitro route 3 times daily As needed. Yes Orlando Rodrigez MD   Alcohol Swabs (PHARMACIST CHOICE ALCOHOL) PADS USE THREE TIMES DAILY Yes Orlando Rodrigez MD   Multiple Vitamins-Minerals (THERAPEUTIC MULTIVITAMIN-MINERALS) tablet Take 1 tablet by mouth daily. Yes Historical Provider, MD   docusate sodium (COLACE) 100 MG capsule Take 100 mg by mouth 2 times daily as needed. Yes Historical Provider, MD   nadolol (CORGARD) 40 MG tablet Take 40 mg by mouth daily. Yes Historical Provider, MD       History reviewed. Objective:   Physical Exam  Constitutional:       General: He is not in acute distress. Appearance: Normal appearance. HENT:      Head: Normocephalic. Right Ear: Hearing normal.      Left Ear: Hearing normal.   Eyes:      General: Lids are normal.      Conjunctiva/sclera: Conjunctivae normal.   Neck:      Musculoskeletal: Normal range of motion and neck supple. Trachea: Trachea normal. No tracheal deviation.    Cardiovascular:      Rate and Rhythm: Normal rate and regular rhythm. Pulses:           Femoral pulses are 2+ on the right side and 2+ on the left side. Popliteal pulses are 2+ on the right side and 1+ on the left side. Dorsalis pedis pulses are 1+ on the right side and 1+ on the left side. Posterior tibial pulses are 1+ on the right side and 1+ on the left side. Heart sounds: Normal heart sounds. Comments:   GAUARV'S 3/4/2020:  Right:  P.T.:118  D.P.: 200 ARM BP: 168/80          P. I.: 0.70/1.19    Left:  P.T.: 158 D.P.: 158 ARM BP: 168/80          P. I.: 0.94/0.94    Pulmonary:      Effort: Pulmonary effort is normal.      Breath sounds: Normal breath sounds. Musculoskeletal: Normal range of motion. Right lower leg: No edema (+4 pitting edema in ankles; right ankle measures 26.6 cm, calf 37 cm). Left lower leg: Left lower leg edema: +4 pitting edema; left ankle measures 26 cm, calf 34.7 cm. Feet:    Skin:     General: Skin is warm and dry. Neurological:      Mental Status: He is alert and oriented to person, place, and time. Comments: Gait: slow and cautious   Psychiatric:         Judgment: Judgment normal.         Assessment:        Diagnosis   1. Atherosclerosis of native artery of both lower extremities with intermittent claudication ([address]    2. Mixed hyperlipidemia - stable, on Lipitor   3. Essential hypertension, benign - sable, on torsemide, quinapril, amlodipine, and nadolol   4. Leg swelling - SpandaGrips applied to bilateral LE's       Patient reports no pain. He walks to his barn, but not much more. Per LE arterial scan today:  Right:  Greater than 50% stenosis of the right proximal popliteal artery. Significantly diminished flow right BARBARA. Right GAURAV: 1.19. This is consistent with non-compressible vessels due to arterial calcification. Left:  Greater than 50% stenosis of the left proximal deep femoral artery. 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   The left BARBARA and PTA appear to be occluded. The left peroneal artery could not be visualized. Left GAURAV of 0.94 is probably falsely elevated. SpandaGrips applied to bilateral lower extremities.     PATIENT EDUCATION focused on the need for some mild compression. We are giving her a Spandagrip. It improves blood flow in the leg, prevents blood clotting and inhibits the progression of a variety of venous disorders. The Spandagrip helps squeeze the venous blood back up toward the heart, to enhance circulation. PATIENT EDUCATION focused on elevating legs with the ankle at or above the level of the heart as needed to relieve leg pain and swelling. Patient to participate in exercise as tolerated with focus on the leg(s) including, daily walking, repetitive toe pointing, and calve muscle pumping/stretching as tolerated. PATIENT EDUCATION:  Contact our office should you begin to experience the following symptoms of the lower extremities:     Leg numbness/weakness   Coldness in lower leg or foot   Change in color of legs   Sores on toes, feet or legs that don't heal   Shiny skin on legs   No pulse or weak pulse in legs or feet    If pain can occur even when you're at rest or lying down. This type of pain is called ischemic rest pain. It can be strong enough to disrupt sleep. Stretching your legs or walking around may help to relieve the pain. Purchase over the counter support stockings to apply to your lower legs. Wear these on a daily basis, off every night. Plan:         Return in about 6 months (around 9/4/2020) for ADS with GAURAV's & Office Visit.

## 2020-03-04 NOTE — PATIENT INSTRUCTIONS
Return in about 6 months (around 9/4/2020) for ADS with GAURAV's & Office Visit. PATIENT EDUCATION:  Contact our office should you begin to experience the following symptoms of the lower extremities:     Leg numbness/weakness   Coldness in lower leg or foot   Change in color of legs   Sores on toes, feet or legs that don't heal   Shiny skin on legs   No pulse or weak pulse in legs or feet    If pain can occur even when you're at rest or lying down. This type of pain is called ischemic rest pain. It can be strong enough to disrupt sleep. Stretching your legs or walking around may help to relieve the pain. Purchase over the counter support stockings to apply to your lower legs. Wear these on a daily basis, off every night. PATIENT EDUCATION focused on elevating legs with the ankle at or above the level of the heart as needed to relieve leg pain and swelling. Patient to participate in exercise as tolerated with focus on the leg(s) including, daily walking, repetitive toe pointing, and calve muscle pumping/stretching as tolerated.

## 2020-03-08 PROBLEM — M79.89 LEG SWELLING: Status: ACTIVE | Noted: 2020-03-08

## 2020-03-23 RX ORDER — ATORVASTATIN CALCIUM 40 MG/1
TABLET, FILM COATED ORAL
Qty: 90 TABLET | Refills: 1 | Status: SHIPPED | OUTPATIENT
Start: 2020-03-23 | End: 2020-09-17

## 2020-06-22 ENCOUNTER — TELEPHONE (OUTPATIENT)
Dept: FAMILY MEDICINE CLINIC | Age: 85
End: 2020-06-22

## 2020-06-22 RX ORDER — LEVOTHYROXINE SODIUM 0.03 MG/1
TABLET ORAL
Qty: 90 TABLET | Refills: 1 | Status: SHIPPED | OUTPATIENT
Start: 2020-06-22 | End: 2020-12-03

## 2020-06-25 RX ORDER — CLOPIDOGREL BISULFATE 75 MG/1
TABLET ORAL
Qty: 90 TABLET | Refills: 1 | Status: SHIPPED | OUTPATIENT
Start: 2020-06-25 | End: 2020-12-03

## 2020-07-13 ENCOUNTER — OFFICE VISIT (OUTPATIENT)
Dept: FAMILY MEDICINE CLINIC | Age: 85
End: 2020-07-13
Payer: MEDICARE

## 2020-07-13 VITALS
BODY MASS INDEX: 26.99 KG/M2 | TEMPERATURE: 97.9 F | HEART RATE: 72 BPM | HEIGHT: 65 IN | DIASTOLIC BLOOD PRESSURE: 84 MMHG | WEIGHT: 162 LBS | SYSTOLIC BLOOD PRESSURE: 146 MMHG

## 2020-07-13 PROCEDURE — 99214 OFFICE O/P EST MOD 30 MIN: CPT | Performed by: FAMILY MEDICINE

## 2020-07-13 PROCEDURE — G8510 SCR DEP NEG, NO PLAN REQD: HCPCS | Performed by: FAMILY MEDICINE

## 2020-07-13 PROCEDURE — 3051F HG A1C>EQUAL 7.0%<8.0%: CPT | Performed by: FAMILY MEDICINE

## 2020-07-13 ASSESSMENT — PATIENT HEALTH QUESTIONNAIRE - PHQ9
2. FEELING DOWN, DEPRESSED OR HOPELESS: 0
SUM OF ALL RESPONSES TO PHQ QUESTIONS 1-9: 0
1. LITTLE INTEREST OR PLEASURE IN DOING THINGS: 0
SUM OF ALL RESPONSES TO PHQ9 QUESTIONS 1 & 2: 0
SUM OF ALL RESPONSES TO PHQ QUESTIONS 1-9: 0

## 2020-07-13 ASSESSMENT — ENCOUNTER SYMPTOMS
ABDOMINAL DISTENTION: 0
SHORTNESS OF BREATH: 0
CONSTIPATION: 0
CHEST TIGHTNESS: 0
BLOOD IN STOOL: 0
ABDOMINAL PAIN: 0
NAUSEA: 0
VOMITING: 0
DIARRHEA: 0
COUGH: 0

## 2020-07-13 NOTE — PROGRESS NOTES
Subjective:      Patient ID: Rick Hughes is a 81 y.o. male. Patient presents with:  2 Week Follow-Up    He is here for a check up   He is here with the daughter    There has been some confusion for a month     Liz Frias going in to the house a week ago   [place] [employer] his daughter here does the bills etc. She has done so for a while  He does own meals  Takes care of his Own hygiene and eating and meal prep bathing etc  Does his own yard work     YOB: 1935    Date of Visit:  7/13/2020     -- Penicillins -- Hives    Current Outpatient Medications:  clopidogrel (PLAVIX) 75 MG tablet, TAKE 1 TABLET BY MOUTH EVERY DAY, Disp: 90 tablet, Rfl: 1  levothyroxine (SYNTHROID) 25 MCG tablet, TAKE 1 TABLET BY MOUTH DAILY, Disp: 90 tablet, Rfl: 1  atorvastatin (LIPITOR) 40 MG tablet, TAKE 1 TABLET BY MOUTH EVERY DAY, Disp: 90 tablet, Rfl: 1  HUMALOG KWIKPEN 100 UNIT/ML SOPN, INJECT 10 UNITS INTO THE SKIN THREE TIMES DAILY BEFORE MEALS, Disp: 45 mL, Rfl: 5  torsemide (DEMADEX) 10 MG tablet, Take 10 mg by mouth 2 times daily, Disp: , Rfl:   Continuous Blood Gluc Sensor ([address] [place] [address]) MISC, E11.29, Disp: 1 each, Rfl: 0  quinapril (ACCUPRIL) 40 MG tablet, Take 0.5 tablets by mouth daily, Disp: 90 tablet, Rfl: 2  insulin lispro (HUMALOG KWIKPEN) 100 UNIT/ML pen, Inject 5 Units into the skin 3 times daily (before meals), Disp: 45 mL, Rfl: 5  Insulin Pen Needle 31G X 6 MM MISC, 1 each by Does not apply route 3 times daily Test TID DX E11.29, Disp: 100 each, Rfl: 6  amLODIPine (NORVASC) 10 MG tablet, Take 10 mg by mouth daily, Disp: , Rfl:   SOFT TOUCH LANCETS MISC, 1 each by Does not apply route 3 times daily (before meals), Disp: 270 each, Rfl: 3  Blood Glucose Monitoring Suppl (ACCU-CHEK MARTHA) KENJI, 1 each by Does not apply route 3 times daily (before meals), Disp: 1 Device, Rfl: 0  glucose blood VI test strips (ACCU-CHEK MARTHA PLUS) strip, 1 each by In Vitro route 3 times daily As needed. , Disp: 270 each, Rfl: 3  Alcohol Swabs (PHARMACIST CHOICE ALCOHOL) PADS, USE THREE TIMES DAILY, Disp: 300 each, Rfl: PRN  Multiple Vitamins-Minerals (THERAPEUTIC MULTIVITAMIN-MINERALS) tablet, Take 1 tablet by mouth daily. , Disp: , Rfl:   docusate sodium (COLACE) 100 MG capsule, Take 100 mg by mouth 2 times daily as needed. , Disp: , Rfl:   nadolol (CORGARD) 40 MG tablet, Take 40 mg by mouth daily. , Disp: , Rfl:     No current facility-administered medications for this visit.       ---------------------------               07/13/20                      1415         ---------------------------   BP:        (!) 146/84       Site:    Left Upper Arm     Position:     Sitting        Cuff Size:  Medium Adult      Pulse:         72           Temp:   97.9 °F (36.6 °C)   TempSrc:    Temporal        Weight: 162 lb (73.5 kg)    Height:  5' 5\" (1.651 m)   ---------------------------  Body mass index is 26.96 kg/m². Wt Readings from Last 3 Encounters:  07/13/20 : 162 lb (73.5 kg)  03/04/20 : 163 lb (73.9 kg)  12/06/19 : 159 lb 12.8 oz (72.5 kg)    BP Readings from Last 3 Encounters:  07/13/20 : (!) 146/84  03/04/20 : (!) 168/80  12/06/19 : 130/84        Review of Systems   Constitutional: Negative for appetite change, chills, fever and unexpected weight change. Respiratory: Negative for cough, chest tightness and shortness of breath. Cardiovascular: Negative for chest pain, palpitations and leg swelling. Gastrointestinal: Negative for abdominal distention, abdominal pain, blood in stool, constipation, diarrhea, nausea and vomiting. Genitourinary: Negative for difficulty urinating, dysuria and hematuria. Neurological: Negative for dizziness and headaches. Psychiatric/Behavioral: Negative for sleep disturbance. He denies feeling depressed or down        Objective:   Physical Exam  Constitutional:       General: He is not in acute distress. Appearance: Normal appearance.  He is well-developed. He is not ill-appearing or diaphoretic. Eyes:      General: No scleral icterus. Neck:      Musculoskeletal: Neck supple. Thyroid: No thyroid mass or thyromegaly. Cardiovascular:      Rate and Rhythm: Normal rate and regular rhythm. Heart sounds: Normal heart sounds. No murmur. No friction rub. No gallop. Pulmonary:      Effort: Pulmonary effort is normal. No tachypnea, accessory muscle usage or respiratory distress. Breath sounds: Normal breath sounds. No decreased breath sounds, wheezing, rhonchi or rales. Abdominal:      Palpations: Abdomen is soft. There is no hepatomegaly, splenomegaly, mass or pulsatile mass. Tenderness: There is no abdominal tenderness. There is no guarding. Lymphadenopathy:      Cervical: No cervical adenopathy. Upper Body:      Right upper body: No supraclavicular adenopathy. Left upper body: No supraclavicular adenopathy. Skin:     General: Skin is warm and dry. Coloration: Skin is not pale. Neurological:      Mental Status: He is alert. Assessment:       Diagnosis Orders   1. Type 2 diabetes mellitus with chronic kidney disease, with long-term current use of insulin, unspecified CKD stage (HCC)  Comprehensive Metabolic Panel    Hemoglobin A1C    Cholesterol, Total   2. Essential hypertension, benign  Comprehensive Metabolic Panel    Cholesterol, Total   3. Acquired hypothyroidism  TSH without Reflex    T4, Free   4. Memory changes  CT Head WO Contrast    TSH without Reflex    Vitamin B12 & Folate    Comprehensive Metabolic Panel   5.  Falling  CT Head WO Contrast    Vitamin B12 & Folate     Discussed with patient and daughter      Plan:      Continue the diet  Stay with the medicines  See in 3 months         Ortiz Green MD

## 2020-07-17 DIAGNOSIS — R29.6 FALLING: ICD-10-CM

## 2020-07-17 DIAGNOSIS — Z79.4 TYPE 2 DIABETES MELLITUS WITH CHRONIC KIDNEY DISEASE, WITH LONG-TERM CURRENT USE OF INSULIN, UNSPECIFIED CKD STAGE (HCC): ICD-10-CM

## 2020-07-17 DIAGNOSIS — E11.22 TYPE 2 DIABETES MELLITUS WITH CHRONIC KIDNEY DISEASE, WITH LONG-TERM CURRENT USE OF INSULIN, UNSPECIFIED CKD STAGE (HCC): ICD-10-CM

## 2020-07-17 DIAGNOSIS — I10 ESSENTIAL HYPERTENSION, BENIGN: ICD-10-CM

## 2020-07-17 DIAGNOSIS — R41.3 MEMORY CHANGES: ICD-10-CM

## 2020-07-17 DIAGNOSIS — E03.9 ACQUIRED HYPOTHYROIDISM: ICD-10-CM

## 2020-07-17 LAB
A/G RATIO: 0.8 (ref 1.1–2.2)
ALBUMIN SERPL-MCNC: 3.3 G/DL (ref 3.4–5)
ALP BLD-CCNC: 86 U/L (ref 40–129)
ALT SERPL-CCNC: <5 U/L (ref 10–40)
ANION GAP SERPL CALCULATED.3IONS-SCNC: 13 MMOL/L (ref 3–16)
AST SERPL-CCNC: 19 U/L (ref 15–37)
BILIRUB SERPL-MCNC: 0.3 MG/DL (ref 0–1)
BUN BLDV-MCNC: 40 MG/DL (ref 7–20)
CALCIUM SERPL-MCNC: 9.1 MG/DL (ref 8.3–10.6)
CHLORIDE BLD-SCNC: 106 MMOL/L (ref 99–110)
CHOLESTEROL, TOTAL: 135 MG/DL (ref 0–199)
CO2: 21 MMOL/L (ref 21–32)
CREAT SERPL-MCNC: 2.4 MG/DL (ref 0.8–1.3)
ESTIMATED AVERAGE GLUCOSE: 162.8 MG/DL
FOLATE: 6.78 NG/ML (ref 4.78–24.2)
GFR AFRICAN AMERICAN: 31
GFR NON-AFRICAN AMERICAN: 26
GLOBULIN: 4.1 G/DL
GLUCOSE BLD-MCNC: 89 MG/DL (ref 70–99)
HBA1C MFR BLD: 7.3 %
POTASSIUM SERPL-SCNC: 5.1 MMOL/L (ref 3.5–5.1)
SODIUM BLD-SCNC: 140 MMOL/L (ref 136–145)
T4 FREE: 1.1 NG/DL (ref 0.9–1.8)
TOTAL PROTEIN: 7.4 G/DL (ref 6.4–8.2)
TSH SERPL DL<=0.05 MIU/L-ACNC: 4.28 UIU/ML (ref 0.27–4.2)
VITAMIN B-12: 503 PG/ML (ref 211–911)

## 2020-07-23 ENCOUNTER — HOSPITAL ENCOUNTER (OUTPATIENT)
Dept: CT IMAGING | Age: 85
Discharge: HOME | End: 2020-07-23
Payer: MEDICARE

## 2020-07-23 PROCEDURE — 70450 CT HEAD/BRAIN W/O DYE: CPT

## 2020-08-04 ENCOUNTER — TELEPHONE (OUTPATIENT)
Dept: FAMILY MEDICINE CLINIC | Age: 85
End: 2020-08-04

## 2020-09-08 DIAGNOSIS — I73.9 PAD (PERIPHERAL ARTERY DISEASE) (HCC): ICD-10-CM

## 2020-09-08 DIAGNOSIS — I70.213 ATHEROSCLEROSIS OF NATIVE ARTERY OF BOTH LOWER EXTREMITIES WITH INTERMITTENT CLAUDICATION (HCC): Primary | ICD-10-CM

## 2020-09-09 ENCOUNTER — OFFICE VISIT (OUTPATIENT)
Dept: SURGERY | Age: 85
End: 2020-09-09
Payer: MEDICARE

## 2020-09-09 ENCOUNTER — PROCEDURE VISIT (OUTPATIENT)
Dept: SURGERY | Age: 85
End: 2020-09-09
Payer: MEDICARE

## 2020-09-09 VITALS — DIASTOLIC BLOOD PRESSURE: 74 MMHG | WEIGHT: 162 LBS | SYSTOLIC BLOOD PRESSURE: 186 MMHG | BODY MASS INDEX: 26.96 KG/M2

## 2020-09-09 PROCEDURE — 99214 OFFICE O/P EST MOD 30 MIN: CPT | Performed by: NURSE PRACTITIONER

## 2020-09-09 PROCEDURE — 93925 LOWER EXTREMITY STUDY: CPT | Performed by: SURGERY

## 2020-09-09 NOTE — PATIENT INSTRUCTIONS
Mr. Tracy Valdez should return in six months for a repeat bilateral arterial duplex scan and office visit. PATIENT EDUCATION focused on elevating legs with the ankle at or above the level of the heart as needed to relieve leg pain and swelling. Patient to participate in exercise as tolerated with focus on the leg(s) including, daily walking, repetitive toe pointing, and calve muscle pumping/stretching as tolerated.

## 2020-09-09 NOTE — PROGRESS NOTES
Subjective:      Patient ID: Carrie Edwards is a 81 y.o. male who is here today for a six month follow up ADS and GAURAV's. Pain Assessment  The patient is currently not experiencing any pain at this time. MARCIA Lowe is a 81 y.o. male who presents with a complaint of intermittent claudication. The problem is located in the bilateral LE's. Date last seen was 3/4/2020. The symptoms first began year(s) ago. The patient has not experienced any new symptoms since last visit. The patient denies s/s of claudication in either leg. 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 There have been no new developments in patient's medical status. Since last visit the patient has had a bilateral LE arterial scan today with GAURAV's. The patient has had hx of right 2nd and 3rd toe amp, hx of left TMA. Endovascular procedures include right SFA & popliteal artery stents, angioplasty tibioperoneal trunk & peroneal artery 10/23/13, left SFA and popliteal artery stents, angioplasty peroneal artery 1/28/14, left SFA, popliteal, tibioperoneal trunk & peroneal artery atherectomy w/angioplasty 7/23/14 and angioplasty left EIA, SFA and tibioperoneal trunk 1/12/17. He sees Dr. Lenore Sutherland for foot care. Review of Systems   Constitutional: Negative for chills and fever. Cardiovascular: Positive for leg swelling (bilateral). Skin: Negative for wound. All other systems reviewed and are negative. Allergies   Allergen Reactions    Penicillins Hives       Prior to Visit Medications    Medication Sig Taking?  Authorizing Provider   clopidogrel (PLAVIX) 75 MG tablet TAKE 1 TABLET BY MOUTH EVERY DAY Yes Suzette Staton MD   levothyroxine (SYNTHROID) 25 MCG tablet TAKE 1 TABLET BY MOUTH DAILY Yes Suzette Staton MD   atorvastatin (LIPITOR) 40 MG tablet TAKE 1 TABLET BY MOUTH EVERY DAY Yes Suzette Staton MD   HUMALOG KWIKPEN 100 UNIT/ML SOPN INJECT 10 UNITS INTO THE SKIN THREE TIMES DAILY rate and regular rhythm. Pulses:           Carotid pulses are 2+ on the right side and 2+ on the left side. Femoral pulses are 2+ on the right side and 2+ on the left side. Popliteal pulses are 2+ on the right side and 1+ on the left side. Dorsalis pedis pulses are 1+ on the right side and 1+ on the left side. Posterior tibial pulses are 1+ on the right side and 1+ on the left side. Heart sounds: Normal heart sounds. Comments:   GAURAV's 9/9/2020  RT GAURAV: .97  LT GAURAV: .87    GAURAV'S 3/4/2020:  Right:  P.T.:118  D.P.: 200 ARM BP: 168/80          P. I.: 0.70/1.19    Left:  P.T.: 158 D.P.: 158 ARM BP: 168/80          P. I.: 0.94/0.94    Pulmonary:      Effort: Pulmonary effort is normal.      Breath sounds: Normal breath sounds. Musculoskeletal: Normal range of motion. Right lower leg: Edema (+2-3 in feet) present. Left lower leg: Edema (+2-3 in feet) present. Feet:    Skin:     General: Skin is warm and dry. Neurological:      Mental Status: He is alert and oriented to person, place, and time. Comments: Gait: slow and cautious   Psychiatric:         Judgment: Judgment normal.         Assessment:          Diagnosis   1. Atherosclerosis of native artery of both lower extremities with intermittent claudication ([address]    2. Mixed hyperlipidemia - stable, on Lipitor   3. Essential hypertension, benign - stable, on torsemide, quinapril, amlodipine and nadolol   4. Leg swelling        Per LE arterial scan today:  Right:  Greater than 50% stenosis of the right popliteal artery. Significantly diminished flow right BARBARA. Right GAURAV 1.22 is consistent with non-compressible vessels due to arterial calcification. Left:  Greater than 50% stenosis of the left deep femoral artery. Total occlusion of the distal popliteal artery. Collateral flow noted in tibioperoneal trunk, main arteries appear occluded. The left peroneal artery is not identified.   Left GAURAV 0.788 is falsely elevated. No significant change since previous study on 3/4/2020. PATIENT EDUCATION focused on elevating legs with the ankle at or above the level of the heart as needed to relieve leg pain and swelling. Patient to participate in exercise as tolerated with focus on the leg(s) including, daily walking, repetitive toe pointing, and calve muscle pumping/stretching as tolerated. Plan:      Mr. Denisha Alberto should return in six months for a repeat bilateral arterial duplex scan with GAURAV's and office visit. Aura Bodega, [place]     GUERA Ojeda CNP, personally performed the services described in this documentation as scribed by the Medical Assistant Carlos Helms in my presence and it is both accurate and complete.

## 2020-09-17 RX ORDER — QUINAPRIL 40 MG/1
TABLET ORAL
Qty: 90 TABLET | Refills: 2 | Status: SHIPPED | OUTPATIENT
Start: 2020-09-17 | End: 2021-05-24 | Stop reason: ALTCHOICE

## 2020-10-20 ENCOUNTER — OFFICE VISIT (OUTPATIENT)
Dept: FAMILY MEDICINE CLINIC | Age: 85
End: 2020-10-20
Payer: MEDICARE

## 2020-10-20 VITALS
SYSTOLIC BLOOD PRESSURE: 114 MMHG | HEART RATE: 72 BPM | WEIGHT: 159 LBS | HEIGHT: 65 IN | TEMPERATURE: 97.1 F | DIASTOLIC BLOOD PRESSURE: 82 MMHG | BODY MASS INDEX: 26.49 KG/M2

## 2020-10-20 PROCEDURE — 90694 VACC AIIV4 NO PRSRV 0.5ML IM: CPT | Performed by: FAMILY MEDICINE

## 2020-10-20 PROCEDURE — 3051F HG A1C>EQUAL 7.0%<8.0%: CPT | Performed by: FAMILY MEDICINE

## 2020-10-20 PROCEDURE — 99213 OFFICE O/P EST LOW 20 MIN: CPT | Performed by: FAMILY MEDICINE

## 2020-10-20 PROCEDURE — G0008 ADMIN INFLUENZA VIRUS VAC: HCPCS | Performed by: FAMILY MEDICINE

## 2020-10-20 NOTE — PROGRESS NOTES
Vaccine Information Sheet, \"Influenza - Inactivated\"  given to [employer], or parent/legal guardian of  [employer] and verbalized understanding. Patient responses:    Have you ever had a reaction to a flu vaccine? No  Do you have any current illness? No  Have you ever had [place] Syndrome? No  Do you have a serious allergy to any of the follow: Neomycin, Polymyxin, Thimerosal, eggs or egg products? No    Flu vaccine given per order. Please see immunization tab. Risks and benefits explained. Current VIS given.

## 2020-10-20 NOTE — PROGRESS NOTES
Subjective:      Patient ID: Wes Lipscomb is a 81 y.o. male. Patient presents with:  3 Month Follow-Up: diabetes, hypertension - pt is not fasting    He is well no c/o  He is on the same meds  Still with memory issues  Here with son    He wants flu shot and no pb in past  No c/o  Family notes no concerns  No c/o pain  Appetite is fine  No bm or urine issues   No foot sores    YOB: 1935    Date of Visit:  10/20/2020     -- Penicillins -- Hives    Current Outpatient Medications:  atorvastatin (LIPITOR) 40 MG tablet, TAKE 1 TABLET BY MOUTH EVERY DAY, Disp: 90 tablet, Rfl: 1  quinapril (ACCUPRIL) 40 MG tablet, TAKE ONE-HALF TABLET BY MOUTH DAILY, Disp: 90 tablet, Rfl: 2  clopidogrel (PLAVIX) 75 MG tablet, TAKE 1 TABLET BY MOUTH EVERY DAY, Disp: 90 tablet, Rfl: 1  levothyroxine (SYNTHROID) 25 MCG tablet, TAKE 1 TABLET BY MOUTH DAILY, Disp: 90 tablet, Rfl: 1  HUMALOG KWIKPEN 100 UNIT/ML SOPN, INJECT 10 UNITS INTO THE SKIN THREE TIMES DAILY BEFORE MEALS, Disp: 45 mL, Rfl: 5  torsemide (DEMADEX) 10 MG tablet, Take 10 mg by mouth 2 times daily, Disp: , Rfl:   Continuous Blood Gluc Sensor (FREESTYLE KAREN SENSOR SYSTEM) MISC, E11.29, Disp: 1 each, Rfl: 0  insulin lispro (HUMALOG KWIKPEN) 100 UNIT/ML pen, Inject 5 Units into the skin 3 times daily (before meals), Disp: 45 mL, Rfl: 5  Insulin Pen Needle 31G X 6 MM MISC, 1 each by Does not apply route 3 times daily Test TID DX E11.29, Disp: 100 each, Rfl: 6  amLODIPine (NORVASC) 10 MG tablet, Take 10 mg by mouth daily, Disp: , Rfl:   SOFT TOUCH LANCETS MISC, 1 each by Does not apply route 3 times daily (before meals), Disp: 270 each, Rfl: 3  Blood Glucose Monitoring Suppl (ACCU-CHEK MARTHA) KENJI, 1 each by Does not apply route 3 times daily (before meals), Disp: 1 Device, Rfl: 0  glucose blood VI test strips (ACCU-CHEK MARTHA PLUS) strip, 1 each by In Vitro route 3 times daily As needed. , Disp: 270 each, Rfl: 3  Alcohol Swabs (PHARMACIST CHOICE ALCOHOL) PADS, USE THREE TIMES DAILY, Disp: 300 each, Rfl: PRN  Multiple Vitamins-Minerals (THERAPEUTIC MULTIVITAMIN-MINERALS) tablet, Take 1 tablet by mouth daily. , Disp: , Rfl:   docusate sodium (COLACE) 100 MG capsule, Take 100 mg by mouth 2 times daily as needed. , Disp: , Rfl:   nadolol (CORGARD) 40 MG tablet, Take 40 mg by mouth daily. , Disp: , Rfl:     No current facility-administered medications for this visit.       ---------------------------               10/20/20                      1018         ---------------------------   BP:          114/82         Site:    Left Upper Arm     Position:     Sitting        Cuff Size:   Large Adult      Pulse:         72           Temp:   97.1 °F (36.2 °C)   TempSrc:    Temporal        Weight: 159 lb (72.1 kg)    Height:  5' 5\" (1.651 m)   ---------------------------  Body mass index is 26.46 kg/m². Wt Readings from Last 3 Encounters:  10/20/20 : 159 lb (72.1 kg)  09/09/20 : 162 lb (73.5 kg)  07/13/20 : 162 lb (73.5 kg)    BP Readings from Last 3 Encounters:  10/20/20 : 114/82  09/09/20 : (!) 186/74  07/13/20 : (!) 146/84            Review of Systems    Objective:   Physical Exam  Constitutional:       General: He is not in acute distress. Appearance: Normal appearance. He is well-developed. He is not ill-appearing or diaphoretic. Eyes:      General: No scleral icterus. Neck:      Musculoskeletal: Neck supple. Thyroid: No thyroid mass or thyromegaly. Cardiovascular:      Rate and Rhythm: Normal rate and regular rhythm. Heart sounds: Normal heart sounds. No murmur. No friction rub. No gallop. Pulmonary:      Effort: Pulmonary effort is normal. No tachypnea, accessory muscle usage or respiratory distress. Breath sounds: Normal breath sounds. No decreased breath sounds, wheezing, rhonchi or rales. Lymphadenopathy:      Cervical: No cervical adenopathy.       Upper Body:      Right upper body: No supraclavicular adenopathy. Left upper body: No supraclavicular adenopathy. Skin:     General: Skin is warm and dry. Coloration: Skin is not pale. Neurological:      Mental Status: He is alert. Assessment:        Diagnosis Orders   1. Type 2 diabetes mellitus with chronic kidney disease, with long-term current use of insulin, unspecified CKD stage (HCC)  Hemoglobin A1C    Glucose   2. Acquired hypothyroidism  TSH without Reflex   3.  Needs flu shot  INFLUENZA, QUADV, ADJUVANTED, 72 YRS =, IM, PF, PREFILL SYR, 0.5ML (FLUAD)       Did discuss with son about see geriatrics at [facility]  overall well  Dementia        Plan:      Continue the medicine  See first week of march        Garrett Cramer MD

## 2020-12-03 RX ORDER — CLOPIDOGREL BISULFATE 75 MG/1
TABLET ORAL
Qty: 90 TABLET | Refills: 1 | Status: SHIPPED | OUTPATIENT
Start: 2020-12-03 | End: 2021-04-15

## 2020-12-03 RX ORDER — LEVOTHYROXINE SODIUM 0.03 MG/1
TABLET ORAL
Qty: 90 TABLET | Refills: 1 | Status: SHIPPED | OUTPATIENT
Start: 2020-12-03 | End: 2021-06-14

## 2021-01-26 ENCOUNTER — TELEPHONE (OUTPATIENT)
Dept: FAMILY MEDICINE CLINIC | Age: 86
End: 2021-01-26

## 2021-01-26 NOTE — TELEPHONE ENCOUNTER
Fabiano Pereakwabena (daughter) calling b/c she wants to know if pt should get the COVID vaccine. Fabiano Molina is just concerned b/c of the pt's diabetes.

## 2021-02-01 ENCOUNTER — TELEPHONE (OUTPATIENT)
Dept: FAMILY MEDICINE CLINIC | Age: 86
End: 2021-02-01

## 2021-02-01 RX ORDER — INSULIN LISPRO 100 [IU]/ML
10 INJECTION, SOLUTION INTRAVENOUS; SUBCUTANEOUS
Qty: 45 ML | Refills: 5 | Status: SHIPPED | OUTPATIENT
Start: 2021-02-01

## 2021-02-01 RX ORDER — CARVEDILOL 25 MG/1
1 TABLET, FILM COATED ORAL 3 TIMES DAILY
Qty: 100 EACH | Refills: 5 | Status: SHIPPED | OUTPATIENT
Start: 2021-02-01

## 2021-02-01 NOTE — TELEPHONE ENCOUNTER
Done    Orders Placed This Encounter   Medications    insulin lispro, 1 Unit Dial, (HUMALOG KWIKPEN) 100 UNIT/ML SOPN     Sig: Inject 10 Units into the skin 3 times daily (before meals)     Dispense:  45 mL     Refill:  5    blood glucose test strips (CONTOUR TEST) strip     Si each by In Vitro route 3 times daily     Dispense:  100 each     Refill:  5

## 2021-02-01 NOTE — TELEPHONE ENCOUNTER
Sonia Curran patient daughter is calling to get Humalog kwikpen sent to [employer]. She would also like the contour test strips to be sent to [facility] in [place], they can get them much cheaper there.         Please call, [phone]

## 2021-02-22 ENCOUNTER — OFFICE VISIT (OUTPATIENT)
Dept: FAMILY MEDICINE CLINIC | Age: 86
End: 2021-02-22
Payer: MEDICARE

## 2021-02-22 VITALS
DIASTOLIC BLOOD PRESSURE: 74 MMHG | SYSTOLIC BLOOD PRESSURE: 136 MMHG | HEART RATE: 56 BPM | WEIGHT: 174 LBS | HEIGHT: 65 IN | TEMPERATURE: 97 F | BODY MASS INDEX: 28.99 KG/M2

## 2021-02-22 DIAGNOSIS — I10 ESSENTIAL HYPERTENSION, BENIGN: ICD-10-CM

## 2021-02-22 DIAGNOSIS — R60.9 DEPENDENT EDEMA: Primary | ICD-10-CM

## 2021-02-22 PROCEDURE — 99213 OFFICE O/P EST LOW 20 MIN: CPT | Performed by: FAMILY MEDICINE

## 2021-02-22 ASSESSMENT — PATIENT HEALTH QUESTIONNAIRE - PHQ9
SUM OF ALL RESPONSES TO PHQ QUESTIONS 1-9: 0
2. FEELING DOWN, DEPRESSED OR HOPELESS: 0

## 2021-02-22 NOTE — PATIENT INSTRUCTIONS
Increase the water pill to one twice a day  If this gets worse go to the ER  Do get the blood work before I see you next week  Call me Thursday with and update

## 2021-02-22 NOTE — PROGRESS NOTES
Jennifer Bone (:  1935) is a 81 y.o. male,, here for evaluation of the following chief complaint(s): Foot Swelling (both legs/feet x 4 days - right being worse)      ASSESSMENT/PLAN:   Diagnosis Orders   1. Dependent edema  Basic Metabolic Panel   2. Essential hypertension, benign  Basic Metabolic Panel       It would seem he should be on 2 demadex a day  When chart reviewed  He is only taking one a day   The son confirms same  I can not r/u mild chf   He denies sob   Discussed care with the son     Increase the water pill to one twice a day  If this gets worse go to the ER  Do get the blood work before I see you next week  Call me Thursday with and update    No follow-ups on file. SUBJECTIVE/OBJECTIVE:  Patient presents with: Foot Swelling: both legs/feet x 4 days - right being worse    Does sit most of the time  No fever no cough  Here with the son  No cp  No sob  Glucose is checked at home  He said it varies.    120 -190  Urine is passing well  Appetite is good    meds the same    YOB: 1935    Date of Visit:  2021     -- Penicillins -- Hives    Current Outpatient Medications:    insulin lispro, 1 Unit Dial, (HUMALOG KWIKPEN) 100 UNIT/ML SOPN, Inject 10 Units into the skin 3 times daily (before meals), Disp: 45 mL, Rfl: 5    blood glucose test strips (CONTOUR TEST) strip, 1 each by In Vitro route 3 times daily, Disp: 100 each, Rfl: 5    levothyroxine (SYNTHROID) 25 MCG tablet, TAKE 1 TABLET BY MOUTH DAILY, Disp: 90 tablet, Rfl: 1    clopidogrel (PLAVIX) 75 MG tablet, TAKE 1 TABLET BY MOUTH EVERY DAY, Disp: 90 tablet, Rfl: 1    atorvastatin (LIPITOR) 40 MG tablet, TAKE 1 TABLET BY MOUTH EVERY DAY, Disp: 90 tablet, Rfl: 1    quinapril (ACCUPRIL) 40 MG tablet, TAKE ONE-HALF TABLET BY MOUTH DAILY, Disp: 90 tablet, Rfl: 2    torsemide (DEMADEX) 10 MG tablet, Take 10 mg by mouth 2 times daily, Disp: , Rfl: Rate and Rhythm: Normal rate and regular rhythm. Heart sounds: Normal heart sounds. No murmur. No friction rub. No gallop. Comments: 2+ edema to the knees   Pulmonary:      Effort: Pulmonary effort is normal. No tachypnea, accessory muscle usage or respiratory distress. Breath sounds: Normal breath sounds. No decreased breath sounds, wheezing, rhonchi or rales. Lymphadenopathy:      Cervical: No cervical adenopathy. Upper Body:      Right upper body: No supraclavicular adenopathy. Left upper body: No supraclavicular adenopathy. Skin:     General: Skin is warm and dry. Coloration: Skin is not pale. Neurological:      Mental Status: He is alert. An electronic signature was used to authenticate this note.     --Denita Guerrero MD

## 2021-03-01 ENCOUNTER — OFFICE VISIT (OUTPATIENT)
Dept: FAMILY MEDICINE CLINIC | Age: 86
End: 2021-03-01
Payer: MEDICARE

## 2021-03-01 VITALS
WEIGHT: 174 LBS | DIASTOLIC BLOOD PRESSURE: 70 MMHG | SYSTOLIC BLOOD PRESSURE: 122 MMHG | BODY MASS INDEX: 28.99 KG/M2 | HEIGHT: 65 IN | TEMPERATURE: 97.3 F

## 2021-03-01 DIAGNOSIS — R60.9 DEPENDENT EDEMA: Primary | ICD-10-CM

## 2021-03-01 DIAGNOSIS — I10 ESSENTIAL HYPERTENSION, BENIGN: ICD-10-CM

## 2021-03-01 DIAGNOSIS — E03.9 ACQUIRED HYPOTHYROIDISM: ICD-10-CM

## 2021-03-01 DIAGNOSIS — E11.22 TYPE 2 DIABETES MELLITUS WITH CHRONIC KIDNEY DISEASE, WITH LONG-TERM CURRENT USE OF INSULIN, UNSPECIFIED CKD STAGE (HCC): ICD-10-CM

## 2021-03-01 DIAGNOSIS — R60.9 DEPENDENT EDEMA: ICD-10-CM

## 2021-03-01 DIAGNOSIS — Z79.4 TYPE 2 DIABETES MELLITUS WITH CHRONIC KIDNEY DISEASE, WITH LONG-TERM CURRENT USE OF INSULIN, UNSPECIFIED CKD STAGE (HCC): ICD-10-CM

## 2021-03-01 LAB
ANION GAP SERPL CALCULATED.3IONS-SCNC: 11 MMOL/L (ref 3–16)
BUN BLDV-MCNC: 63 MG/DL (ref 7–20)
CALCIUM SERPL-MCNC: 9.1 MG/DL (ref 8.3–10.6)
CHLORIDE BLD-SCNC: 106 MMOL/L (ref 99–110)
CO2: 22 MMOL/L (ref 21–32)
CREAT SERPL-MCNC: 3.1 MG/DL (ref 0.8–1.3)
GFR AFRICAN AMERICAN: 23
GFR NON-AFRICAN AMERICAN: 19
GLUCOSE BLD-MCNC: 312 MG/DL (ref 70–99)
POTASSIUM SERPL-SCNC: 4.8 MMOL/L (ref 3.5–5.1)
SODIUM BLD-SCNC: 139 MMOL/L (ref 136–145)
TSH SERPL DL<=0.05 MIU/L-ACNC: 3.56 UIU/ML (ref 0.27–4.2)

## 2021-03-01 PROCEDURE — 99212 OFFICE O/P EST SF 10 MIN: CPT | Performed by: FAMILY MEDICINE

## 2021-03-01 NOTE — PATIENT INSTRUCTIONS
See dr Prakash Jiménez today as scheduled  Do continue the same medicines  Let dr Prakash Jiménez know he only recently started with the two a day torsemide  do a weight at home today and a week from now and call me the results  See back in about 2 months

## 2021-03-01 NOTE — PROGRESS NOTES
  SOFT TOUCH LANCETS MISC, 1 each by Does not apply route 3 times daily (before meals), Disp: 270 each, Rfl: 3    Blood Glucose Monitoring Suppl (ACCU-CHEK MARTHA) KENJI, 1 each by Does not apply route 3 times daily (before meals), Disp: 1 Device, Rfl: 0    Alcohol Swabs (PHARMACIST CHOICE ALCOHOL) PADS, USE THREE TIMES DAILY, Disp: 300 each, Rfl: PRN    Multiple Vitamins-Minerals (THERAPEUTIC MULTIVITAMIN-MINERALS) tablet, Take 1 tablet by mouth daily. , Disp: , Rfl:     docusate sodium (COLACE) 100 MG capsule, Take 100 mg by mouth 2 times daily as needed. , Disp: , Rfl:     nadolol (CORGARD) 40 MG tablet, Take 40 mg by mouth daily. , Disp: , Rfl:     No current facility-administered medications for this visit.       ---------------------------               03/01/21                      1126         ---------------------------   BP:          122/70         Site:    Left Upper Arm     Position:     Sitting        Cuff Size:   Large Adult      Temp:   97.3 °F (36.3 °C)   TempSrc:    Temporal        Weight: 174 lb (78.9 kg)    Height:  5' 5\" (1.651 m)   ---------------------------  Body mass index is 28.96 kg/m². Wt Readings from Last 3 Encounters:  03/01/21 : 174 lb (78.9 kg)  02/22/21 : 174 lb (78.9 kg)  10/20/20 : 159 lb (72.1 kg)    BP Readings from Last 3 Encounters:  03/01/21 : 122/70  02/22/21 : 136/74  10/20/20 : 114/82        Review of Systems    Physical Exam  Constitutional:       General: He is not in acute distress. Appearance: Normal appearance. He is well-developed. He is not ill-appearing or diaphoretic. Cardiovascular:      Rate and Rhythm: Normal rate and regular rhythm. Heart sounds: Normal heart sounds. No murmur. No friction rub. No gallop. Comments: He still has the edema as noted before but it appears less  Pulmonary:      Effort: Pulmonary effort is normal. No tachypnea, accessory muscle usage or respiratory distress. Breath sounds: Normal breath sounds. No decreased breath sounds, wheezing, rhonchi or rales. Lymphadenopathy:      Cervical: No cervical adenopathy. Upper Body:      Right upper body: No supraclavicular adenopathy. Left upper body: No supraclavicular adenopathy. Skin:     General: Skin is warm and dry. Coloration: Skin is not pale. Neurological:      Mental Status: He is alert. An electronic signature was used to authenticate this note.     --Lane Brunson MD

## 2021-03-02 DIAGNOSIS — N28.9 RENAL INSUFFICIENCY: ICD-10-CM

## 2021-03-02 DIAGNOSIS — I10 ESSENTIAL HYPERTENSION, BENIGN: Primary | ICD-10-CM

## 2021-03-02 LAB
ESTIMATED AVERAGE GLUCOSE: 191.5 MG/DL
HBA1C MFR BLD: 8.3 %

## 2021-03-08 ENCOUNTER — TELEPHONE (OUTPATIENT)
Dept: FAMILY MEDICINE CLINIC | Age: 86
End: 2021-03-08

## 2021-03-08 NOTE — TELEPHONE ENCOUNTER
That is a nice drop.  He is back to his regular weight   Remember to see his kidney doctor dr Shahid Reynolds  Remember to repeat the blood test we ordered  Do that  This week

## 2021-03-10 ENCOUNTER — PROCEDURE VISIT (OUTPATIENT)
Dept: SURGERY | Age: 86
End: 2021-03-10
Payer: MEDICARE

## 2021-03-10 ENCOUNTER — OFFICE VISIT (OUTPATIENT)
Dept: SURGERY | Age: 86
End: 2021-03-10
Payer: MEDICARE

## 2021-03-10 VITALS — BODY MASS INDEX: 27.72 KG/M2 | WEIGHT: 166.6 LBS | SYSTOLIC BLOOD PRESSURE: 138 MMHG | DIASTOLIC BLOOD PRESSURE: 78 MMHG

## 2021-03-10 DIAGNOSIS — I70.213 ATHEROSCLEROSIS OF NATIVE ARTERY OF BOTH LOWER EXTREMITIES WITH INTERMITTENT CLAUDICATION (HCC): Primary | ICD-10-CM

## 2021-03-10 DIAGNOSIS — M79.89 LEG SWELLING: ICD-10-CM

## 2021-03-10 DIAGNOSIS — I73.9 PVD (PERIPHERAL VASCULAR DISEASE) WITH CLAUDICATION (HCC): Primary | ICD-10-CM

## 2021-03-10 DIAGNOSIS — I10 ESSENTIAL HYPERTENSION, BENIGN: ICD-10-CM

## 2021-03-10 DIAGNOSIS — E78.2 MIXED HYPERLIPIDEMIA: ICD-10-CM

## 2021-03-10 PROCEDURE — 99214 OFFICE O/P EST MOD 30 MIN: CPT | Performed by: NURSE PRACTITIONER

## 2021-03-10 PROCEDURE — 93925 LOWER EXTREMITY STUDY: CPT | Performed by: STUDENT IN AN ORGANIZED HEALTH CARE EDUCATION/TRAINING PROGRAM

## 2021-03-10 NOTE — PROGRESS NOTES
Subjective:      Patient ID: Nelida Dempsey    Chief Complaint   Patient presents with    Circulatory Problem     Pt is here today for a 6 mo followup on LE circulation. Pain Assessment  The patient is currently not experiencing any pain at this time. MARCIA Gonzalez is an 81 y.o. male who presents with a complaint of intermittent claudication. The problem is located in the bilateral LE's. Date last seen was 9/9/2020. The symptoms first began year(s) ago. The patient has not experienced any new symptoms since last visit. The patient denies s/s of claudication in either leg. 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 There have been no new developments in patient's medical status. Since last visit the patient has had a bilateral LE arterial scan today with GAURAV's. The patient has had hx of right 2nd and 3rd toe amp, hx of left TMA. Endovascular procedures include right SFA & popliteal artery stents, angioplasty tibioperoneal trunk & peroneal artery 10/23/13, left SFA and popliteal artery stents, angioplasty peroneal artery 1/28/14, left SFA, popliteal, tibioperoneal trunk & peroneal artery atherectomy w/angioplasty 7/23/14 and angioplasty left EIA, SFA and tibioperoneal trunk 1/12/17. He sees Dr. David Gurrola for foot care. Review of Systems   Constitutional: Negative for chills and fever. Cardiovascular: Positive for leg swelling (bilateral). Musculoskeletal: Positive for gait problem (use of wheelchair). Skin: Negative for wound. Neurological: Positive for weakness (generalized). All other systems reviewed and are negative. Allergies   Allergen Reactions    Penicillins Hives         Prior to Visit Medications    Medication Sig Taking?  Authorizing Provider   insulin lispro, 1 Unit Dial, (HUMALOG KWIKPEN) 100 UNIT/ML SOPN Inject 10 Units into the skin 3 times daily (before meals) Yes Jaguar Kimbrough MD   blood glucose test strips (CONTOUR TEST) strip 1 each by In Vitro route 3 times daily Yes Mary Doll MD   levothyroxine (SYNTHROID) 25 MCG tablet TAKE 1 TABLET BY MOUTH DAILY Yes Mary Doll MD   clopidogrel (PLAVIX) 75 MG tablet TAKE 1 TABLET BY MOUTH EVERY DAY Yes Mary Dlol MD   atorvastatin (LIPITOR) 40 MG tablet TAKE 1 TABLET BY MOUTH EVERY DAY Yes Mary Doll MD   quinapril (ACCUPRIL) 40 MG tablet TAKE ONE-HALF TABLET BY MOUTH DAILY Yes Mary Doll MD   torsemide (DEMADEX) 10 MG tablet Take 10 mg by mouth 2 times daily Yes Historical Provider, MD   Continuous Blood Gluc Sensor ([address] [place] [address]) MISC E11.29 Yes Mary Doll MD   Insulin Pen Needle 31G X 6 MM MISC 1 each by Does not apply route 3 times daily Test TID DX E11.29 Yes Mary Doll MD   amLODIPine (NORVASC) 10 MG tablet Take 10 mg by mouth daily Yes Karla Richardson MD   SOFT TOUCH LANCETS MISC 1 each by Does not apply route 3 times daily (before meals) Yes Mary Doll MD   Blood Glucose Monitoring Suppl (ACCU-CHEK MARTHA) KENJI 1 each by Does not apply route 3 times daily (before meals) Yes Mary Doll MD   Alcohol Swabs (PHARMACIST CHOICE ALCOHOL) PADS USE THREE TIMES DAILY Yes Mary Doll MD   Multiple Vitamins-Minerals (THERAPEUTIC MULTIVITAMIN-MINERALS) tablet Take 1 tablet by mouth daily. Yes Historical Provider, MD   docusate sodium (COLACE) 100 MG capsule Take 100 mg by mouth 2 times daily as needed. Yes Historical Provider, MD   nadolol (CORGARD) 40 MG tablet Take 40 mg by mouth daily. Yes Historical Provider, MD       History reviewed. Objective:   Physical Exam  Constitutional:       General: He is not in acute distress. Appearance: Normal appearance. HENT:      Head: Normocephalic. Right Ear: Hearing normal.      Left Ear: Hearing normal.   Eyes:      General: Lids are normal.      Conjunctiva/sclera: Conjunctivae normal.   Neck:      Musculoskeletal: Normal range of motion and neck supple. Trachea: Trachea normal. No tracheal deviation.    Cardiovascular: Rate and Rhythm: Normal rate and regular rhythm. Pulses:           Carotid pulses are 2+ on the right side and 2+ on the left side. Femoral pulses are 2+ on the right side and 2+ on the left side. Popliteal pulses are 2+ on the right side and 1+ on the left side. Dorsalis pedis pulses are 1+ on the right side and 1+ on the left side. Posterior tibial pulses are 1+ on the right side and 1+ on the left side. Heart sounds: Normal heart sounds. Comments:   GAURAV'S 3/10/2021:  Right:  P.T.:250  D. P.: 250 ARM BP: 140/70          P. I.: 01.79/1.79    Left:  P.T.: 250 D. P.: 250 ARM BP: 138/78          P. I.: 1.79/1.79    GAURAV's 9/9/2020  RT GAURAV: .97  LT GAURAV: .87    GAURAV'S 3/4/2020:  Right:  P.T.:118  D.P.: 200 ARM BP: 168/80          P. I.: 0.70/1.19    Left:  P.T.: 158 D.P.: 158 ARM BP: 168/80          P. I.: 0.94/0.94    Pulmonary:      Effort: Pulmonary effort is normal.      Breath sounds: Normal breath sounds. Musculoskeletal: Normal range of motion. Right lower leg: Edema (+2 pitting edema in RLE and feet) present. Left lower leg: Edema (+2 pitting edema in LLE and feet) present. Feet:    Skin:     General: Skin is warm and dry. Neurological:      Mental Status: He is alert and oriented to person, place, and time. Comments: Gait: slow and cautious   Psychiatric:         Judgment: Judgment normal.         Assessment:          Diagnosis   1. Atherosclerosis of native artery of both lower extremities with intermittent claudication ([address]    2. Mixed hyperlipidemia - stable, on Lipitor 40 mg   3. Essential hypertension, benign - stable, on torsemide 10 mg, quinapril 40 mg, amlodipine 10 mg and nadolol 40 mg   4. Leg swelling - Applied Spandagrips today       Per LE arterial scan today:  Right:  Elevated velocity of the popliteal artery suggests a greater than 50% stenosis. Total occlusion of the right BARBARA.   Right GAURAV 1.79 is consistent with non-compressible vessels due to arterial calcification. Left:  Elevated velocity of the deep femoral artery suggests a greater than 50% stenosis. Total occlusion of the distal popliteal artery and peroneal tibial trunk, with reconstitution in the mid PTA. Left GAURAV 1.79 is consistent with non-compressible vessels due to arterial calcification. No significant change since previous study on 9/9/2020. PATIENT EDUCATION focused on elevating legs with the ankle at or above the level of the heart as needed to relieve leg pain and swelling. Patient to participate in exercise as tolerated with focus on the leg(s) including, walk using your walker, repetitive toe pointing, and calve muscle pumping/stretching as tolerated. Plan:        Return in about 6 months (around 9/10/2021), or ADS, GAURAV's and office visit.

## 2021-03-10 NOTE — PATIENT INSTRUCTIONS
Return in about 6 months (around 9/10/2021), or ADS, GAURAV's and office visit. PATIENT EDUCATION focused on elevating legs with the ankle at or above the level of the heart as needed to relieve leg pain and swelling. Patient to participate in exercise as tolerated with focus on the leg(s) including, walk using your walker, repetitive toe pointing, and calve muscle pumping/stretching as tolerated.

## 2021-03-15 DIAGNOSIS — N28.9 RENAL INSUFFICIENCY: ICD-10-CM

## 2021-03-15 DIAGNOSIS — I10 ESSENTIAL HYPERTENSION, BENIGN: ICD-10-CM

## 2021-03-15 LAB
ANION GAP SERPL CALCULATED.3IONS-SCNC: 13 MMOL/L (ref 3–16)
BUN BLDV-MCNC: 44 MG/DL (ref 7–20)
CALCIUM SERPL-MCNC: 8.9 MG/DL (ref 8.3–10.6)
CHLORIDE BLD-SCNC: 105 MMOL/L (ref 99–110)
CO2: 26 MMOL/L (ref 21–32)
CREAT SERPL-MCNC: 3.3 MG/DL (ref 0.8–1.3)
GFR AFRICAN AMERICAN: 22
GFR NON-AFRICAN AMERICAN: 18
GLUCOSE BLD-MCNC: 218 MG/DL (ref 70–99)
POTASSIUM SERPL-SCNC: 4.6 MMOL/L (ref 3.5–5.1)
SODIUM BLD-SCNC: 144 MMOL/L (ref 136–145)

## 2021-03-17 DIAGNOSIS — N28.9 RENAL INSUFFICIENCY: Primary | ICD-10-CM

## 2021-03-30 RX ORDER — ATORVASTATIN CALCIUM 40 MG/1
TABLET, FILM COATED ORAL
Qty: 90 TABLET | Refills: 1 | Status: SHIPPED | OUTPATIENT
Start: 2021-03-30

## 2021-04-01 DIAGNOSIS — N28.9 RENAL INSUFFICIENCY: ICD-10-CM

## 2021-04-01 LAB
ANION GAP SERPL CALCULATED.3IONS-SCNC: 10 MMOL/L (ref 3–16)
BUN BLDV-MCNC: 57 MG/DL (ref 7–20)
CALCIUM SERPL-MCNC: 9 MG/DL (ref 8.3–10.6)
CHLORIDE BLD-SCNC: 105 MMOL/L (ref 99–110)
CO2: 27 MMOL/L (ref 21–32)
CREAT SERPL-MCNC: 3 MG/DL (ref 0.8–1.3)
GFR AFRICAN AMERICAN: 24
GFR NON-AFRICAN AMERICAN: 20
GLUCOSE BLD-MCNC: 150 MG/DL (ref 70–99)
POTASSIUM SERPL-SCNC: 4.6 MMOL/L (ref 3.5–5.1)
SODIUM BLD-SCNC: 142 MMOL/L (ref 136–145)

## 2021-04-15 RX ORDER — CLOPIDOGREL BISULFATE 75 MG/1
TABLET ORAL
Qty: 90 TABLET | Refills: 1 | Status: SHIPPED | OUTPATIENT
Start: 2021-04-15

## 2021-05-24 ENCOUNTER — OFFICE VISIT (OUTPATIENT)
Dept: FAMILY MEDICINE CLINIC | Age: 86
End: 2021-05-24
Payer: MEDICARE

## 2021-05-24 VITALS — TEMPERATURE: 98.4 F | SYSTOLIC BLOOD PRESSURE: 138 MMHG | DIASTOLIC BLOOD PRESSURE: 74 MMHG | HEART RATE: 72 BPM

## 2021-05-24 DIAGNOSIS — Z79.4 TYPE 2 DIABETES MELLITUS WITH CHRONIC KIDNEY DISEASE, WITH LONG-TERM CURRENT USE OF INSULIN, UNSPECIFIED CKD STAGE (HCC): ICD-10-CM

## 2021-05-24 DIAGNOSIS — R60.9 DEPENDENT EDEMA: Primary | ICD-10-CM

## 2021-05-24 DIAGNOSIS — E11.22 TYPE 2 DIABETES MELLITUS WITH CHRONIC KIDNEY DISEASE, WITH LONG-TERM CURRENT USE OF INSULIN, UNSPECIFIED CKD STAGE (HCC): ICD-10-CM

## 2021-05-24 PROCEDURE — 99213 OFFICE O/P EST LOW 20 MIN: CPT | Performed by: FAMILY MEDICINE

## 2021-05-24 PROCEDURE — 3052F HG A1C>EQUAL 8.0%<EQUAL 9.0%: CPT | Performed by: FAMILY MEDICINE

## 2021-05-24 RX ORDER — TORSEMIDE 10 MG/1
10 TABLET ORAL DAILY
Qty: 30 TABLET | Refills: 0 | Status: SHIPPED
Start: 2021-05-24 | End: 2021-06-03 | Stop reason: SDUPTHER

## 2021-05-24 RX ORDER — AMLODIPINE BESYLATE 10 MG/1
5 TABLET ORAL DAILY
Qty: 30 TABLET | Refills: 0 | Status: ON HOLD
Start: 2021-05-24 | End: 2021-07-01 | Stop reason: HOSPADM

## 2021-05-24 NOTE — PROGRESS NOTES
Subjective:      Patient ID: Rashi Waters is a 81 y.o. male. Chief Complaint   Patient presents with    Follow-up     diabetes, hypertension, lipids - pt is not fasting        Patient presents with: Follow-up: diabetes, hypertension, lipids - pt is not fasting    Here for the above  He is here with his son.  With cici    He gave hx and assisted with his father today  I questioned the patient and he has no c/o    He is supposed to be on only one torsemide a day    YOB: 1935    Date of Visit:  5/24/2021     -- Penicillins -- Hives    Current Outpatient Medications:  clopidogrel (PLAVIX) 75 MG tablet, TAKE 1 TABLET BY MOUTH EVERY DAY, Disp: 90 tablet, Rfl: 1  atorvastatin (LIPITOR) 40 MG tablet, TAKE 1 TABLET BY MOUTH EVERY DAY, Disp: 90 tablet, Rfl: 1  insulin lispro, 1 Unit Dial, (HUMALOG KWIKPEN) 100 UNIT/ML SOPN, Inject 10 Units into the skin 3 times daily (before meals), Disp: 45 mL, Rfl: 5  blood glucose test strips (CONTOUR TEST) strip, 1 each by In Vitro route 3 times daily, Disp: 100 each, Rfl: 5  levothyroxine (SYNTHROID) 25 MCG tablet, TAKE 1 TABLET BY MOUTH DAILY, Disp: 90 tablet, Rfl: 1  torsemide (DEMADEX) 10 MG tablet, Take 10 mg by mouth 2 times daily, Disp: , Rfl: on one a day now   Continuous Blood Gluc Sensor ([facility]) MISC, E11.29, Disp: 1 each, Rfl: 0  Insulin Pen Needle 31G X 6 MM MISC, 1 each by Does not apply route 3 times daily Test TID DX E11.29, Disp: 100 each, Rfl: 6  amLODIPine (NORVASC) 10 MG tablet, Take 10 mg by mouth daily, Disp: , Rfl:   SOFT TOUCH LANCETS MISC, 1 each by Does not apply route 3 times daily (before meals), Disp: 270 each, Rfl: 3  Blood Glucose Monitoring Suppl (ACCU-CHEK MARTHA) KENJI, 1 each by Does not apply route 3 times daily (before meals), Disp: 1 Device, Rfl: 0  Alcohol Swabs (PHARMACIST CHOICE ALCOHOL) PADS, USE THREE TIMES DAILY, Disp: 300 each, Rfl: PRN  Multiple Vitamins-Minerals (THERAPEUTIC MULTIVITAMIN-MINERALS) Assessment:        Diagnosis Orders   1. Dependent edema     2. Type 2 diabetes mellitus with chronic kidney disease, with long-term current use of insulin, unspecified CKD stage (HCC)  Glucose    Hemoglobin A1C       Reviewed the note nephrology on 4/16 re the quinapril and the k in diet. Also the telemed visit with dr Ne Cruz on 4/5/21. He wanted to keep the torsemide low  The son was able to give hx today       Plan:      The last conversation I see from dr Ne Cruz office is that he is to be on just one torsemide a day now  I want to decrease the amlodipine to just one half a day.  That may help the swelling  See in 3 months         Otis Cochran MD

## 2021-05-24 NOTE — PATIENT INSTRUCTIONS
The last conversation I see from dr Cici Hughes office is that he is to be on just one torsemide a day now  I want to decrease the amlodipine to just one half a day.  That may help the swelling  See in 3 months

## 2021-06-03 RX ORDER — TORSEMIDE 10 MG/1
10 TABLET ORAL DAILY
Qty: 30 TABLET | Refills: 2 | Status: ON HOLD | OUTPATIENT
Start: 2021-06-03 | End: 2021-07-01 | Stop reason: HOSPADM

## 2021-06-03 NOTE — PROGRESS NOTES
Daughter here with the mother and she needed mr helene torsemide refilled until can see dr Jamin Etienne on 7/7/21  Since the med may be adjusted further only 30 at a time dispensed  Discussed with daughter quiana

## 2021-06-14 RX ORDER — LEVOTHYROXINE SODIUM 0.03 MG/1
TABLET ORAL
Qty: 90 TABLET | Refills: 1 | Status: SHIPPED | OUTPATIENT
Start: 2021-06-14

## 2021-06-25 ENCOUNTER — NURSE TRIAGE (OUTPATIENT)
Dept: OTHER | Facility: CLINIC | Age: 86
End: 2021-06-25

## 2021-06-25 ENCOUNTER — HOSPITAL ENCOUNTER (INPATIENT)
Age: 86
LOS: 6 days | Discharge: HOSPICE | DRG: 177 | End: 2021-07-01
Attending: EMERGENCY MEDICINE | Admitting: INTERNAL MEDICINE
Payer: MEDICARE

## 2021-06-25 ENCOUNTER — APPOINTMENT (OUTPATIENT)
Dept: GENERAL RADIOLOGY | Age: 86
DRG: 177 | End: 2021-06-25
Payer: MEDICARE

## 2021-06-25 ENCOUNTER — TELEPHONE (OUTPATIENT)
Dept: FAMILY MEDICINE CLINIC | Age: 86
End: 2021-06-25

## 2021-06-25 DIAGNOSIS — R60.9 PERIPHERAL EDEMA: Primary | ICD-10-CM

## 2021-06-25 DIAGNOSIS — N18.9 ACUTE KIDNEY INJURY SUPERIMPOSED ON CHRONIC KIDNEY DISEASE (HCC): ICD-10-CM

## 2021-06-25 DIAGNOSIS — I10 ESSENTIAL HYPERTENSION: ICD-10-CM

## 2021-06-25 DIAGNOSIS — N17.9 ACUTE KIDNEY INJURY SUPERIMPOSED ON CHRONIC KIDNEY DISEASE (HCC): ICD-10-CM

## 2021-06-25 PROBLEM — E87.70 FLUID OVERLOAD: Status: ACTIVE | Noted: 2021-06-25

## 2021-06-25 LAB
A/G RATIO: 0.8 (ref 1.1–2.2)
ALBUMIN SERPL-MCNC: 3.3 G/DL (ref 3.4–5)
ALP BLD-CCNC: 113 U/L (ref 40–129)
ALT SERPL-CCNC: 8 U/L (ref 10–40)
ANION GAP SERPL CALCULATED.3IONS-SCNC: 12 MMOL/L (ref 3–16)
AST SERPL-CCNC: 11 U/L (ref 15–37)
BASOPHILS ABSOLUTE: 0.1 K/UL (ref 0–0.2)
BASOPHILS RELATIVE PERCENT: 1.7 %
BILIRUB SERPL-MCNC: <0.2 MG/DL (ref 0–1)
BUN BLDV-MCNC: 52 MG/DL (ref 7–20)
CALCIUM SERPL-MCNC: 8.9 MG/DL (ref 8.3–10.6)
CHLORIDE BLD-SCNC: 101 MMOL/L (ref 99–110)
CO2: 22 MMOL/L (ref 21–32)
CREAT SERPL-MCNC: 3.1 MG/DL (ref 0.8–1.3)
EOSINOPHILS ABSOLUTE: 0.3 K/UL (ref 0–0.6)
EOSINOPHILS RELATIVE PERCENT: 3.5 %
GFR AFRICAN AMERICAN: 23
GFR NON-AFRICAN AMERICAN: 19
GLOBULIN: 4.1 G/DL
GLUCOSE BLD-MCNC: 222 MG/DL (ref 70–99)
GLUCOSE BLD-MCNC: 240 MG/DL (ref 70–99)
GLUCOSE BLD-MCNC: 329 MG/DL (ref 70–99)
HCT VFR BLD CALC: 39.5 % (ref 40.5–52.5)
HEMOGLOBIN: 13.2 G/DL (ref 13.5–17.5)
LYMPHOCYTES ABSOLUTE: 1.6 K/UL (ref 1–5.1)
LYMPHOCYTES RELATIVE PERCENT: 20.2 %
MCH RBC QN AUTO: 29.8 PG (ref 26–34)
MCHC RBC AUTO-ENTMCNC: 33.4 G/DL (ref 31–36)
MCV RBC AUTO: 89.2 FL (ref 80–100)
MONOCYTES ABSOLUTE: 0.6 K/UL (ref 0–1.3)
MONOCYTES RELATIVE PERCENT: 7.7 %
NEUTROPHILS ABSOLUTE: 5.4 K/UL (ref 1.7–7.7)
NEUTROPHILS RELATIVE PERCENT: 66.9 %
PDW BLD-RTO: 15 % (ref 12.4–15.4)
PERFORMED ON: ABNORMAL
PERFORMED ON: ABNORMAL
PLATELET # BLD: 231 K/UL (ref 135–450)
PMV BLD AUTO: 8.4 FL (ref 5–10.5)
POTASSIUM REFLEX MAGNESIUM: 4.6 MMOL/L (ref 3.5–5.1)
PRO-BNP: ABNORMAL PG/ML (ref 0–449)
RBC # BLD: 4.43 M/UL (ref 4.2–5.9)
SODIUM BLD-SCNC: 135 MMOL/L (ref 136–145)
TOTAL PROTEIN: 7.4 G/DL (ref 6.4–8.2)
TROPONIN: 0.05 NG/ML
WBC # BLD: 8.1 K/UL (ref 4–11)

## 2021-06-25 PROCEDURE — 2580000003 HC RX 258: Performed by: INTERNAL MEDICINE

## 2021-06-25 PROCEDURE — 80053 COMPREHEN METABOLIC PANEL: CPT

## 2021-06-25 PROCEDURE — 71045 X-RAY EXAM CHEST 1 VIEW: CPT

## 2021-06-25 PROCEDURE — 85025 COMPLETE CBC W/AUTO DIFF WBC: CPT

## 2021-06-25 PROCEDURE — 84484 ASSAY OF TROPONIN QUANT: CPT

## 2021-06-25 PROCEDURE — 83880 ASSAY OF NATRIURETIC PEPTIDE: CPT

## 2021-06-25 PROCEDURE — 99284 EMERGENCY DEPT VISIT MOD MDM: CPT

## 2021-06-25 PROCEDURE — 6360000002 HC RX W HCPCS: Performed by: EMERGENCY MEDICINE

## 2021-06-25 PROCEDURE — 1200000000 HC SEMI PRIVATE

## 2021-06-25 PROCEDURE — 6360000002 HC RX W HCPCS

## 2021-06-25 PROCEDURE — 96374 THER/PROPH/DIAG INJ IV PUSH: CPT

## 2021-06-25 PROCEDURE — 6370000000 HC RX 637 (ALT 250 FOR IP): Performed by: INTERNAL MEDICINE

## 2021-06-25 RX ORDER — ONDANSETRON 2 MG/ML
4 INJECTION INTRAMUSCULAR; INTRAVENOUS EVERY 6 HOURS PRN
Status: DISCONTINUED | OUTPATIENT
Start: 2021-06-25 | End: 2021-07-01 | Stop reason: HOSPADM

## 2021-06-25 RX ORDER — HYDRALAZINE HYDROCHLORIDE 20 MG/ML
10 INJECTION INTRAMUSCULAR; INTRAVENOUS EVERY 6 HOURS PRN
Status: DISCONTINUED | OUTPATIENT
Start: 2021-06-25 | End: 2021-07-01 | Stop reason: HOSPADM

## 2021-06-25 RX ORDER — INSULIN GLARGINE 100 [IU]/ML
15 INJECTION, SOLUTION SUBCUTANEOUS NIGHTLY
Status: DISCONTINUED | OUTPATIENT
Start: 2021-06-26 | End: 2021-06-27

## 2021-06-25 RX ORDER — LEVOTHYROXINE SODIUM 0.03 MG/1
25 TABLET ORAL
Status: DISCONTINUED | OUTPATIENT
Start: 2021-06-26 | End: 2021-07-01 | Stop reason: HOSPADM

## 2021-06-25 RX ORDER — FUROSEMIDE 10 MG/ML
40 INJECTION INTRAMUSCULAR; INTRAVENOUS ONCE
Status: COMPLETED | OUTPATIENT
Start: 2021-06-25 | End: 2021-06-25

## 2021-06-25 RX ORDER — ACETAMINOPHEN 325 MG/1
650 TABLET ORAL EVERY 6 HOURS PRN
Status: DISCONTINUED | OUTPATIENT
Start: 2021-06-25 | End: 2021-07-01 | Stop reason: HOSPADM

## 2021-06-25 RX ORDER — SODIUM CHLORIDE 0.9 % (FLUSH) 0.9 %
5-40 SYRINGE (ML) INJECTION EVERY 12 HOURS SCHEDULED
Status: DISCONTINUED | OUTPATIENT
Start: 2021-06-25 | End: 2021-07-01 | Stop reason: HOSPADM

## 2021-06-25 RX ORDER — DEXTROSE MONOHYDRATE 50 MG/ML
100 INJECTION, SOLUTION INTRAVENOUS PRN
Status: DISCONTINUED | OUTPATIENT
Start: 2021-06-25 | End: 2021-06-25 | Stop reason: SDUPTHER

## 2021-06-25 RX ORDER — FUROSEMIDE 10 MG/ML
40 INJECTION INTRAMUSCULAR; INTRAVENOUS 2 TIMES DAILY
Status: DISCONTINUED | OUTPATIENT
Start: 2021-06-26 | End: 2021-06-28

## 2021-06-25 RX ORDER — NADOLOL 20 MG/1
20 TABLET ORAL DAILY
Status: DISCONTINUED | OUTPATIENT
Start: 2021-06-26 | End: 2021-06-29

## 2021-06-25 RX ORDER — CLOPIDOGREL BISULFATE 75 MG/1
75 TABLET ORAL DAILY
Status: DISCONTINUED | OUTPATIENT
Start: 2021-06-26 | End: 2021-07-01 | Stop reason: HOSPADM

## 2021-06-25 RX ORDER — ACETAMINOPHEN 650 MG/1
650 SUPPOSITORY RECTAL EVERY 6 HOURS PRN
Status: DISCONTINUED | OUTPATIENT
Start: 2021-06-25 | End: 2021-07-01 | Stop reason: HOSPADM

## 2021-06-25 RX ORDER — ONDANSETRON 4 MG/1
4 TABLET, ORALLY DISINTEGRATING ORAL EVERY 8 HOURS PRN
Status: DISCONTINUED | OUTPATIENT
Start: 2021-06-25 | End: 2021-07-01 | Stop reason: HOSPADM

## 2021-06-25 RX ORDER — DEXTROSE MONOHYDRATE 25 G/50ML
12.5 INJECTION, SOLUTION INTRAVENOUS PRN
Status: DISCONTINUED | OUTPATIENT
Start: 2021-06-25 | End: 2021-06-25 | Stop reason: SDUPTHER

## 2021-06-25 RX ORDER — HYDRALAZINE HYDROCHLORIDE 20 MG/ML
INJECTION INTRAMUSCULAR; INTRAVENOUS
Status: COMPLETED
Start: 2021-06-25 | End: 2021-06-25

## 2021-06-25 RX ORDER — SODIUM CHLORIDE 9 MG/ML
25 INJECTION, SOLUTION INTRAVENOUS PRN
Status: DISCONTINUED | OUTPATIENT
Start: 2021-06-25 | End: 2021-07-01 | Stop reason: HOSPADM

## 2021-06-25 RX ORDER — AMLODIPINE BESYLATE 5 MG/1
5 TABLET ORAL DAILY
Status: DISCONTINUED | OUTPATIENT
Start: 2021-06-26 | End: 2021-06-27

## 2021-06-25 RX ORDER — NICOTINE POLACRILEX 4 MG
15 LOZENGE BUCCAL PRN
Status: DISCONTINUED | OUTPATIENT
Start: 2021-06-25 | End: 2021-06-25 | Stop reason: SDUPTHER

## 2021-06-25 RX ORDER — SODIUM CHLORIDE 0.9 % (FLUSH) 0.9 %
5-40 SYRINGE (ML) INJECTION PRN
Status: DISCONTINUED | OUTPATIENT
Start: 2021-06-25 | End: 2021-07-01 | Stop reason: HOSPADM

## 2021-06-25 RX ORDER — HYDRALAZINE HYDROCHLORIDE 20 MG/ML
20 INJECTION INTRAMUSCULAR; INTRAVENOUS ONCE
Status: COMPLETED | OUTPATIENT
Start: 2021-06-25 | End: 2021-06-25

## 2021-06-25 RX ORDER — DOCUSATE SODIUM 100 MG/1
100 CAPSULE, LIQUID FILLED ORAL 2 TIMES DAILY PRN
Status: DISCONTINUED | OUTPATIENT
Start: 2021-06-25 | End: 2021-07-01 | Stop reason: HOSPADM

## 2021-06-25 RX ORDER — ATORVASTATIN CALCIUM 40 MG/1
40 TABLET, FILM COATED ORAL DAILY
Status: DISCONTINUED | OUTPATIENT
Start: 2021-06-26 | End: 2021-07-01 | Stop reason: HOSPADM

## 2021-06-25 RX ORDER — POLYETHYLENE GLYCOL 3350 17 G/17G
17 POWDER, FOR SOLUTION ORAL DAILY PRN
Status: DISCONTINUED | OUTPATIENT
Start: 2021-06-25 | End: 2021-07-01 | Stop reason: HOSPADM

## 2021-06-25 RX ORDER — NICOTINE POLACRILEX 4 MG
15 LOZENGE BUCCAL PRN
Status: DISCONTINUED | OUTPATIENT
Start: 2021-06-25 | End: 2021-07-01 | Stop reason: HOSPADM

## 2021-06-25 RX ORDER — DEXTROSE MONOHYDRATE 25 G/50ML
12.5 INJECTION, SOLUTION INTRAVENOUS PRN
Status: DISCONTINUED | OUTPATIENT
Start: 2021-06-25 | End: 2021-07-01 | Stop reason: HOSPADM

## 2021-06-25 RX ORDER — DEXTROSE MONOHYDRATE 50 MG/ML
100 INJECTION, SOLUTION INTRAVENOUS PRN
Status: DISCONTINUED | OUTPATIENT
Start: 2021-06-25 | End: 2021-07-01 | Stop reason: HOSPADM

## 2021-06-25 RX ADMIN — ACETAMINOPHEN 650 MG: 325 TABLET ORAL at 20:42

## 2021-06-25 RX ADMIN — HYDRALAZINE HYDROCHLORIDE 20 MG: 20 INJECTION INTRAMUSCULAR; INTRAVENOUS at 18:56

## 2021-06-25 RX ADMIN — SODIUM CHLORIDE, PRESERVATIVE FREE 10 ML: 5 INJECTION INTRAVENOUS at 22:00

## 2021-06-25 RX ADMIN — FUROSEMIDE 40 MG: 10 INJECTION, SOLUTION INTRAMUSCULAR; INTRAVENOUS at 18:15

## 2021-06-25 ASSESSMENT — PAIN DESCRIPTION - ONSET: ONSET: GRADUAL

## 2021-06-25 ASSESSMENT — PAIN DESCRIPTION - ORIENTATION: ORIENTATION: LOWER;MID

## 2021-06-25 ASSESSMENT — PAIN SCALES - GENERAL
PAINLEVEL_OUTOF10: 3
PAINLEVEL_OUTOF10: 0

## 2021-06-25 ASSESSMENT — PAIN DESCRIPTION - LOCATION: LOCATION: BACK

## 2021-06-25 ASSESSMENT — PAIN DESCRIPTION - FREQUENCY: FREQUENCY: CONTINUOUS

## 2021-06-25 ASSESSMENT — PAIN DESCRIPTION - PROGRESSION: CLINICAL_PROGRESSION: NOT CHANGED

## 2021-06-25 ASSESSMENT — PAIN DESCRIPTION - PAIN TYPE: TYPE: ACUTE PAIN

## 2021-06-25 NOTE — ED NOTES
Pharmacy Medication Reconciliation Note     List of medications patient is currently taking is complete. Source of information:   1. EMR  2. Disp records  3. Family in room    Notes regarding home medications:   1. Received AM medications  2. Amlodipine recently reduced to 5 mg daily by cardiologist. Nadolol also reduced to 20 mg daily with intention to switch to atenolol 25 mg daily once he runs out.      Denies taking any other OTC or herbal medications    Darshana Mcbride PharmD, [facility]  6/25/2021  6:05 PM

## 2021-06-25 NOTE — TELEPHONE ENCOUNTER
Pt's daughter Severa Plowman  is calling     Pt's cant walk - feet are pretty swollen up   Pt's sugar is ranging from 200-400. Not sure what how much insuline pt takes  Pt is NOT talking just stares at you. Pl advise.

## 2021-06-25 NOTE — ED PROVIDER NOTES
eMERGENCY dEPARTMENT eNCOUnter      Pt Name: Katrin Nettles  MRN: [id number]  [place] 1935  Date of evaluation: 6/25/2021  Provider: Kayla Delgado MD     [address] [place] [address]       Chief Complaint   Patient presents with    Leg Swelling     Started this week         HISTORY OF PRESENT ILLNESS   (Location/Symptom, Timing/Onset,Context/Setting, Quality, Duration, Modifying Factors, Severity) Note limiting factors. HPI    Katrin Nettles is a 81 y.o. male who presents to the emergency department with bilateral leg swelling. She has history of renal failure and dependent edema and family states for the past week he has been having increased swelling and pain. Is been no chest pain no shortness of breath. No cough or fever. Patient walks with a walker at home. Lives with his wife. Family nearby. Patient has no drainage. He is on a water pill called Demadex. Does have a nephrologist who is been taking care of and watching his renal status. Nursing Notes were reviewed. REVIEW OFSYSTEMS    (2+ for level 4; 10+ for level 5)   Review of Systems    General: No fevers, chills or night sweats, No weight loss    Head:  No Sore throat,  No Ear Pain    Chest:  Nontender. No Cough, No SOB,  Chest Pain    GI: No abdominal pain or vomiting    : No dysuria or hematuria    Musculoskeletal: No unrelenting pain or night pain    Neurologic: No bowel or bladder incontinence, No saddle anesthesia, No leg weakness    All other systems reviewed and are negative. PAST MEDICAL HISTORY     Past Medical History:   Diagnosis Date    Arthritis     Circulation problem     Diabetes mellitus ([address]     Gangrene of toe ([address]     Hyperlipidemia     Hypertension     Toe amputation status 4/5/2013.     left foot    Wears glasses     reading       SURGICAL HISTORY       Past Surgical History:   Procedure Laterality Date    CARDIAC CATHETERIZATION      COLONOSCOPY  9/20/11    polyp, dr Arnold Corley, check in 5 years   [facility] COLONOSCOPY  04/20/2017    polyp dr Daryl Alfaro. removed.  no further checks    FOOT AMPUTATION  9/16/13     left transmetatarsal amputation    HERNIA REPAIR      inguinal    ID AMPUTATION TOE,MT-P JT  Right 2nd and 3rd toe    Amputation, Toe    SKIN SPLIT GRAFT  9/16/13    left thigh    TOE AMPUTATION  4/4/2013    [address] [place] [address]    TOE AMPUTATION  9/16/13    right 2nd toe amp       CURRENT MEDICATIONS       Current Discharge Medication List      CONTINUE these medications which have NOT CHANGED    Details   levothyroxine (SYNTHROID) 25 MCG tablet TAKE 1 TABLET BY MOUTH DAILY  Qty: 90 tablet, Refills: 1      torsemide (DEMADEX) 10 MG tablet Take 1 tablet by mouth daily  Qty: 30 tablet, Refills: 2      amLODIPine (NORVASC) 10 MG tablet Take 0.5 tablets by mouth daily  Qty: 30 tablet, Refills: 0      clopidogrel (PLAVIX) 75 MG tablet TAKE 1 TABLET BY MOUTH EVERY DAY  Qty: 90 tablet, Refills: 1      atorvastatin (LIPITOR) 40 MG tablet TAKE 1 TABLET BY MOUTH EVERY DAY  Qty: 90 tablet, Refills: 1      insulin lispro, 1 Unit Dial, (HUMALOG KWIKPEN) 100 UNIT/ML SOPN Inject 10 Units into the skin 3 times daily (before meals)  Qty: 45 mL, Refills: 5      blood glucose test strips (CONTOUR TEST) strip 1 each by In Vitro route 3 times daily  Qty: 100 each, Refills: 5      Continuous Blood Gluc Sensor (FREESTYLE KAREN SENSOR SYSTEM) MISC E11.29  Qty: 1 each, Refills: 0      Insulin Pen Needle 31G X 6 MM MISC 1 each by Does not apply route 3 times daily Test TID DX E11.29  Qty: 100 each, Refills: 6      SOFT TOUCH LANCETS MISC 1 each by Does not apply route 3 times daily (before meals)  Qty: 270 each, Refills: 3    Associated Diagnoses: Type 2 diabetes mellitus with diabetic chronic kidney disease, unspecified CKD stage      Blood Glucose Monitoring Suppl (ACCU-CHEK MARTHA) KENJI 1 each by Does not apply route 3 times daily (before meals)  Qty: 1 Device, Refills: 0    Comments: Dispense appropriate glucose monitor device for his insulin controlled diabetes  Associated Diagnoses: Type 2 diabetes mellitus with diabetic chronic kidney disease, unspecified CKD stage      Alcohol Swabs (PHARMACIST CHOICE ALCOHOL) PADS USE THREE TIMES DAILY  Qty: 300 each, Refills: PRN      docusate sodium (COLACE) 100 MG capsule Take 100 mg by mouth 2 times daily as needed for Constipation       nadolol (CORGARD) 40 MG tablet Take 20 mg by mouth daily              ALLERGIES     Penicillins    FAMILY HISTORY     History reviewed. No pertinent family history. SOCIAL HISTORY       Social History     Socioeconomic History    Marital status:      Spouse name: None    Number of children: None    Years of education: None    Highest education level: None   Occupational History    Occupation: [employer]   Tobacco Use    Smoking status: Never Smoker    Smokeless tobacco: Never Used   Substance and Sexual Activity    Alcohol use: No    Drug use: No    Sexual activity: Not Currently     Partners: Female   Other Topics Concern    None   Social History Narrative    None     Social Determinants of Health     Financial Resource Strain: Low Risk     Difficulty of Paying Living Expenses: Not hard at all   Food Insecurity: No Food Insecurity    Worried About Running Out of Food in the Last Year: Never true    [address] in the Last Year: Never true   Transportation Needs:     Lack of Transportation (Medical):      Lack of Transportation (Non-Medical):    Physical Activity:     Days of Exercise per Week:     Minutes of Exercise per Session:    Stress:     Feeling of Stress :    Social Connections:     Frequency of Communication with Friends and Family:     Frequency of Social Gatherings with Friends and Family:     Attends Hindu Services:     Active Member of Clubs or Organizations:     Attends Club or Organization Meetings:     Marital Status:    Intimate Partner Violence:     Fear of Current or Ex-Partner:     Emotionally Abused:     Physically Abused:     Sexually Abused:        SCREENINGS    [place] Coma Scale  Eye Opening: Spontaneous  Best Verbal Response: Oriented  Best Motor Response: Obeys commands  [place] Coma Scale Score: 15      PHYSICAL EXAM    (up to 7 for level 4, 8 or more for level 5)     ED Triage Vitals [06/25/21 1533]   BP Temp Temp Source Pulse Resp SpO2 Height Weight   (!) 221/98 97.8 °F (36.6 °C) Temporal 76 18 94 % -- --       Physical Exam    General: Alert and awake ×3. Nontoxic appearance. Well-developed well-nourished 73-year-old male in no acute distress  HEENT: Normocephalic atraumatic. Neck is supple. Airway intact. No adenopathy  Cardiac: Regular rate and rhythm with no murmurs rubs or gallops  Pulmonary: Lungs are clear in all lung fields. No wheezing. No Rales. Abdomen: Soft and nontender. Negative hepatosplenomegaly. Bowel sounds are active  Extremities: Moving all extremities. No calf tenderness. Peripheral pulses all intact. Bilateral pedal edema from foot all the way up to the mid calf. Also has a bilateral toe to foot midfoot amputation secondary to diabetes and vascular disease. Neurovascular exam was diminished. There is no cellulitis. Skin: No skin lesions. No rashes  Neurologic: Cranial nerves II through XII was grossly intact. Nonfocal neurological exam  Psychiatric: Patient is pleasant. Mood is appropriate. DIAGNOSTIC RESULTS     EKG (Per Emergency Physician):       RADIOLOGY (Per Emergency Physician): Interpretation per the Radiologist below, if available at the time of this note:  XR CHEST PORTABLE    Result Date: 6/25/2021  EXAMINATION: [address] CHEST 6/25/2021 5:16 pm COMPARISON: Chest x-ray dated 09/21/2013 HISTORY: ORDERING SYSTEM PROVIDED HISTORY: SOB TECHNOLOGIST PROVIDED HISTORY: Reason for exam:->SOB Reason for Exam: sob Acuity: Acute Type of Exam: Initial FINDINGS: HEART/MEDIASTINUM: The cardiomediastinal silhouette is stable.  PLEURA/LUNGS: Subtle patchy opacities noted at the right lung base and left upper lung which may reflect multifocal airspace disease. There are no focal pleural effusions. There is no appreciable pneumothorax. BONES/SOFT TISSUE: No acute abnormality. Subtle patchy opacities noted at the right lung base and left upper lung which may reflect multifocal airspace disease.        ED BEDSIDE ULTRASOUND:   Performed by ED Physician - none    LABS:  Labs Reviewed   CBC WITH AUTO DIFFERENTIAL - Abnormal; Notable for the following components:       Result Value    Hemoglobin 13.2 (*)     Hematocrit 39.5 (*)     All other components within normal limits    Narrative:     Performed at:  [address] [place] [address]   Phone [phone]   COMPREHENSIVE METABOLIC PANEL W/ REFLEX TO MG FOR LOW K - Abnormal; Notable for the following components:    Sodium 135 (*)     Glucose 222 (*)     BUN 52 (*)     CREATININE 3.1 (*)     GFR Non- 19 (*)     GFR  23 (*)     Albumin 3.3 (*)     Albumin/Globulin Ratio 0.8 (*)     ALT 8 (*)     AST 11 (*)     All other components within normal limits    Narrative:     Performed at:  [facility]  [address] [facility] [address] [place] [address]   Phone [phone]   BRAIN NATRIURETIC PEPTIDE - Abnormal; Notable for the following components:    Pro-BNP 33,631 (*)     All other components within normal limits    Narrative:     Performed at:  [facility]  [address] [facility] [address] [place] [address]   Phone [phone]   TROPONIN - Abnormal; Notable for the following components:    Troponin 0.05 (*)     All other components within normal limits    Narrative:     Performed at:  [facility]  [address] [facility] [address] [place] [address]   Phone [phone]   POCT GLUCOSE - Abnormal; Notable for the following components:    POC Glucose 240 (*) All other components within normal limits    Narrative:     Performed at:  [facility]  [address]  [facility] [place] [address]   Phone [phone]   POCT GLUCOSE - Abnormal; Notable for the following components:    POC Glucose 329 (*)     All other components within normal limits    Narrative:     Performed at:  [facility]  [address] [place] [address]   Phone [phone]   [address]        All other labs were within normal range or not returned as of this dictation.       Procedures      EMERGENCY DEPARTMENT COURSE and DIFFERENTIAL DIAGNOSIS/MDM:   Vitals:    Vitals:    06/25/21 1917 06/25/21 1932 06/25/21 2042 06/25/21 2045   BP: (!) 167/69 (!) 147/68  (!) 196/75   Pulse: 72 71  82   Resp: 16 26  18   Temp:    97.8 °F (36.6 °C)   TempSrc:    Oral   SpO2: 98% 98% 97%    Weight:   161 lb 2.5 oz (73.1 kg)    Height:   5' 4\" (1.626 m)        Medications   nadolol (CORGARD) tablet 20 mg (has no administration in time range)   docusate sodium (COLACE) capsule 100 mg (has no administration in time range)   insulin lispro (HUMALOG) injection vial 10 Units (has no administration in time range)   atorvastatin (LIPITOR) tablet 40 mg (has no administration in time range)   clopidogrel (PLAVIX) tablet 75 mg (has no administration in time range)   amLODIPine (NORVASC) tablet 5 mg (has no administration in time range)   levothyroxine (SYNTHROID) tablet 25 mcg (has no administration in time range)   sodium chloride flush 0.9 % injection 5-40 mL (10 mLs Intravenous Given 6/25/21 2200)   sodium chloride flush 0.9 % injection 5-40 mL (has no administration in time range)   0.9 % sodium chloride infusion (has no administration in time range)   ondansetron (ZOFRAN-ODT) disintegrating tablet 4 mg (has no administration in time range)     Or   ondansetron (ZOFRAN) injection 4 mg (has no administration in time range)   polyethylene glycol (GLYCOLAX) packet 17 g (has no administration in time range)   acetaminophen (TYLENOL) tablet 650 mg (650 mg Oral Given 6/25/21 2042)     Or   acetaminophen (TYLENOL) suppository 650 mg ( Rectal See Alternative 6/25/21 2042)   furosemide (LASIX) injection 40 mg (has no administration in time range)   perflutren lipid microspheres (DEFINITY) injection 1.65 mg (has no administration in time range)   hydrALAZINE (APRESOLINE) injection 10 mg (has no administration in time range)   enoxaparin (LOVENOX) injection 30 mg (has no administration in time range)   glucose (GLUTOSE) 40 % oral gel 15 g (has no administration in time range)   dextrose 50 % IV solution (has no administration in time range)   glucagon (rDNA) injection 1 mg (has no administration in time range)   dextrose 5 % solution (has no administration in time range)   furosemide (LASIX) injection 40 mg (40 mg Intravenous Given 6/25/21 1815)   hydrALAZINE (APRESOLINE) injection 20 mg (20 mg Intravenous Given 6/25/21 1856)       [facility]. Patient is a 81-year-old with dependent edema and increasing getting worse. There is no shortness of breath. No fever. Patient is on a water pill. Exam consistent with fluid retention. Patient started on Lasix. Patient is urinating well. He does have a mild MELVIN. Will discuss with hospitalist for admission. And will need further work-up per nephrology. REVAL:         CRITICAL CARE TIME   Total CriticalCare time was 0 minutes, excluding separately reportable procedures. There was a high probability of clinically significant/life threatening deterioration in the patient's condition which required my urgent intervention. CONSULTS:  IP CONSULT TO NEPHROLOGY  IP CONSULT TO HEART FAILURE NURSE/COORDINATOR  IP CONSULT TO DIETITIAN    PROCEDURES:  Unless otherwise noted below, none     [unfilled]    FINAL IMPRESSION      1. Peripheral edema    2. Essential hypertension    3.  Acute kidney injury superimposed on chronic kidney disease ([facility] [address]          DISPOSITION/PLAN   DISPOSITION Admitted 06/25/2021 06:18:18 PM      PATIENT REFERRED TO:  No follow-up provider specified. DISCHARGE MEDICATIONS:  Current Discharge Medication List             (Please note:  Portions of this note were completed with a voice recognition program.Efforts were made to edit the dictations but occasionally words and phrases are mis-transcribed.)  Form v2016. J.5-cn    Mook SULLIVAN MD (electronically signed)  Emergency Medicine Provider        Car Price MD  06/25/21 8147

## 2021-06-25 NOTE — ED NOTES
Spoke to Janny Chin MD about pt's BP, 218/82 (118) and SpO2 on room air @ 89-90%. Iggy THOMPSON at bedside, request to give 20 mg hydralazine IVP and place pt on nasal cannula. Pt placed on 2 L NC at this time. SpO2 98%.       Elizabeth Higgins RN  06/25/21 7764

## 2021-06-25 NOTE — TELEPHONE ENCOUNTER
Call came in from Shlomo at the [facility]    Patient is not present for triage. Daughter would like to make an appt for elevated blood glucose (200-400) and swelling in the legs. Transfer to [facility] at the [facility] for scheduling.

## 2021-06-25 NOTE — H&P
Hospital Medicine History & Physical      PCP: Yesenia Aragon MD    Date of Admission: 6/25/2021    Date of Service: Pt seen/examined on 6/25/2021    Chief Complaint:      Chief Complaint   Patient presents with    Leg Swelling     Started this week       History Of Present Illness:     69-year-old male with past medical history of hypertension, diabetes mellitus, hypothyroidism, history of peripheral arterial disease with right toe amputation, left TMA presented to the hospital with complains of bilateral leg swelling and shortness of breath. Patient does not speak much and this responds with yes and no most of the time. As per his daughter he has been getting more short of breath and worsening bilateral lower extremity edema. Due to worsening symptoms he was brought to the hospital.  On arrival he was noted to be hypertensive blood pressure 200/100 chest x-ray was performed which showed findings concerning for multifocal airspace disease. proBNP was noted to be elevated at 33,000. Creatinine was 3.1. Patient was admitted to the hospital for further work-up and management. Past Medical History:        Diagnosis Date    Arthritis     Circulation problem     Diabetes mellitus ([address]     Gangrene of toe ([facility]     Hyperlipidemia     Hypertension     Toe amputation status 4/5/2013. left foot    Wears glasses     reading       Past Surgical History:        Procedure Laterality Date    CARDIAC CATHETERIZATION      COLONOSCOPY  9/20/11    polyp, dr Jahaira De, check in 5 years    COLONOSCOPY  04/20/2017    polyp dr Jahaira De. removed.  no further checks    FOOT AMPUTATION  9/16/13     left transmetatarsal amputation    HERNIA REPAIR      inguinal    MN AMPUTATION TOE,MT-P JT  Right 2nd and 3rd toe    Amputation, Toe    SKIN SPLIT GRAFT  9/16/13    left thigh    TOE AMP[facility]ATION  4/4/2013    [facility]    TOE AMPUTATION  9/16/13    right 2nd toe amp       Medications Prior to Admission:    Prior to Component Value Date    COLORU YELLOW 12/28/2018    WBCUA 1 12/28/2018    RBCUA 1 12/28/2018    BACTERIA Rare 08/05/2011    CLARITYU Clear 12/28/2018    SPECGRAV 1.011 12/28/2018    LEUKOCYTESUR Negative 12/28/2018    BLOODU TRACE 12/28/2018    GLUCOSEU Negative 12/28/2018    GLUCOSEU Negative 12/20/2011       ABG  No results found for: POO6JYB, BEART, N3MEIPKP, PHART, THGBART, PPU2YCK, PO2ART, IMC4XJX    UA:No results for input(s): NITRITE, COLORU, PHUR, LABCAST, WBCUA, RBCUA, MUCUS, TRICHOMONAS, YEAST, BACTERIA, CLARITYU, SPECGRAV, LEUKOCYTESUR, UROBILINOGEN, BILIRUBINUR, BLOODU, GLUCOSEU, KETUA, AMORPHOUS in the last 72 hours. Microbiology:  No results for input(s): LABGRAM, LABANAE, ORG, CXSURG in the last 72 hours. Nasal Culture: No results for input(s): ORG, MRSAPCR in the last 72 hours. Blood Culture: No results for input(s): BC, BLOODCULT2, ORG in the last 72 hours. Fungal Culture:   No results for input(s): FUNGSM in the last 72 hours. No results for input(s): FUNCXBLD in the last 72 hours. CSF Culture:  No results for input(s): COLORCSF, APPEARCSF, CFTUBE, CLOTCSF, WBCCSF, RBCCSF, NEUTCSF, NUMCELLSCSF, LYMPHSCSF, MONOCSF, GLUCCSF, VOLCSF in the last 72 hours. Respiratory Culture:  No results for input(s): [place] in the last 72 hours. AFB:No results for input(s): AFBSMEAR in the last 72 hours. Urine Culture  No results for input(s): LABURIN in the last 72 hours. RADIOLOGY:    XR CHEST PORTABLE   Final Result   Subtle patchy opacities noted at the right lung base and left upper lung   which may reflect multifocal airspace disease.              Previous medical records personally reviewed and analyzed         PHYSICIAN CERTIFICATION    I certify that Malika Mcdaniels is expected to be hospitalized for >2 midnights based on the following assessment and plan:    ASSESSMENT/PLAN:    Bilateral lower extremity swelling with concern for fluid overload  -Continue Lasix 40 mg IV twice daily  -Echo has been ordered  -Nephrology evaluation  -Strict input and output    Hypertension  -Continue amlodipine, nadolol    CKD stage IV  -Continue monitor creatinine    Diabetes mellitus  -Continue insulin sliding scale, glargine and lispro    Peripheral arterial disease  -Status post right second and third toe amputation, left TMA  -Continue Plavix, Lipitor    Hypothyroidism  -Continue Synthroid      DVT Prophylaxis: Lovenox  Diet: ADULT DIET; Regular; 3 carb choices (45 gm/meal); Low Sodium (2 gm)  Code Status: Full Code  Dispo -pending clinical course       Ton Amin MD  The note was completed using EMR. Every effort was made to ensure accuracy; however, inadvertent computerized transcription errors may be present. Thank you Kurtis Canela MD for the opportunity to be involved in this patient's care. If you have any questions or concerns please feel free to contact me at [phone].

## 2021-06-25 NOTE — TELEPHONE ENCOUNTER
Staff discussed with family and with his presentation which was different when we talked to him he was sent to the ER

## 2021-06-25 NOTE — ED TRIAGE NOTES
Patient to ED via private car c/o bilateral leg swelling for awhile, worse this week where it is making it hard for him to walk. Lives at home with wife, uses walker. Denies CHF, reports known kidney issues.

## 2021-06-26 ENCOUNTER — APPOINTMENT (OUTPATIENT)
Dept: GENERAL RADIOLOGY | Age: 86
DRG: 177 | End: 2021-06-26
Payer: MEDICARE

## 2021-06-26 LAB
ALBUMIN SERPL-MCNC: 2.8 G/DL (ref 3.4–5)
ANION GAP SERPL CALCULATED.3IONS-SCNC: 12 MMOL/L (ref 3–16)
ANION GAP SERPL CALCULATED.3IONS-SCNC: 15 MMOL/L (ref 3–16)
BASE EXCESS ARTERIAL: 3 (ref -3–3)
BILIRUBIN URINE: NEGATIVE
BLOOD, URINE: NEGATIVE
BUN BLDV-MCNC: 51 MG/DL (ref 7–20)
BUN BLDV-MCNC: 55 MG/DL (ref 7–20)
CALCIUM IONIZED: 1.24 MMOL/L (ref 1.12–1.32)
CALCIUM SERPL-MCNC: 8.7 MG/DL (ref 8.3–10.6)
CALCIUM SERPL-MCNC: 8.8 MG/DL (ref 8.3–10.6)
CHLORIDE BLD-SCNC: 103 MMOL/L (ref 99–110)
CHLORIDE BLD-SCNC: 104 MMOL/L (ref 99–110)
CHOLESTEROL, TOTAL: 126 MG/DL (ref 0–199)
CLARITY: CLEAR
CO2: 18 MMOL/L (ref 21–32)
CO2: 21 MMOL/L (ref 21–32)
COLOR: YELLOW
CREAT SERPL-MCNC: 3.2 MG/DL (ref 0.8–1.3)
CREAT SERPL-MCNC: 3.3 MG/DL (ref 0.8–1.3)
EPITHELIAL CELLS, UA: NORMAL /HPF (ref 0–5)
ESTIMATED AVERAGE GLUCOSE: 243.2 MG/DL
GFR AFRICAN AMERICAN: 22
GFR AFRICAN AMERICAN: 22
GFR NON-AFRICAN AMERICAN: 18
GFR NON-AFRICAN AMERICAN: 19
GLUCOSE BLD-MCNC: 110 MG/DL (ref 70–99)
GLUCOSE BLD-MCNC: 141 MG/DL (ref 70–99)
GLUCOSE BLD-MCNC: 150 MG/DL (ref 70–99)
GLUCOSE BLD-MCNC: 166 MG/DL (ref 70–99)
GLUCOSE BLD-MCNC: 169 MG/DL (ref 70–99)
GLUCOSE BLD-MCNC: 172 MG/DL (ref 70–99)
GLUCOSE BLD-MCNC: 22 MG/DL (ref 70–99)
GLUCOSE BLD-MCNC: 251 MG/DL (ref 70–99)
GLUCOSE BLD-MCNC: 80 MG/DL (ref 70–99)
GLUCOSE BLD-MCNC: 83 MG/DL (ref 70–99)
GLUCOSE BLD-MCNC: 87 MG/DL (ref 70–99)
GLUCOSE BLD-MCNC: 87 MG/DL (ref 70–99)
GLUCOSE BLD-MCNC: 88 MG/DL (ref 70–99)
GLUCOSE URINE: 100 MG/DL
HBA1C MFR BLD: 10.1 %
HCO3 ARTERIAL: 28.5 MMOL/L (ref 21–29)
HDLC SERPL-MCNC: 57 MG/DL (ref 40–60)
HEMOGLOBIN: 13.2 GM/DL (ref 13.5–17.5)
KETONES, URINE: NEGATIVE MG/DL
LACTATE: 0.69 MMOL/L (ref 0.4–2)
LDL CHOLESTEROL CALCULATED: 51 MG/DL
LEUKOCYTE ESTERASE, URINE: NEGATIVE
LV EF: 55 %
LVEF MODALITY: NORMAL
MAGNESIUM: 2 MG/DL (ref 1.8–2.4)
MICROSCOPIC EXAMINATION: YES
NITRITE, URINE: NEGATIVE
O2 SAT, ARTERIAL: 95 % (ref 93–100)
PCO2 ARTERIAL: 51.9 MM HG (ref 35–45)
PERFORMED ON: ABNORMAL
PERFORMED ON: NORMAL
PH ARTERIAL: 7.35 (ref 7.35–7.45)
PH UA: 5.5 (ref 5–8)
PHOSPHORUS: 4.4 MG/DL (ref 2.5–4.9)
PO2 ARTERIAL: 83.5 MM HG (ref 75–108)
POC HEMATOCRIT: 39 % (ref 40.5–52.5)
POC POTASSIUM: 3.6 MMOL/L (ref 3.5–5.1)
POC SAMPLE TYPE: ABNORMAL
POC SODIUM: 145 MMOL/L (ref 136–145)
POTASSIUM SERPL-SCNC: 4.1 MMOL/L (ref 3.5–5.1)
POTASSIUM SERPL-SCNC: 4.2 MMOL/L (ref 3.5–5.1)
PROTEIN PROTEIN: 0.15 G/DL
PROTEIN PROTEIN: 149 MG/DL
PROTEIN UA: 100 MG/DL
RBC UA: NORMAL /HPF (ref 0–4)
SODIUM BLD-SCNC: 136 MMOL/L (ref 136–145)
SODIUM BLD-SCNC: 137 MMOL/L (ref 136–145)
SPECIFIC GRAVITY UA: 1.01 (ref 1–1.03)
TCO2 ARTERIAL: 30 MMOL/L
TRIGL SERPL-MCNC: 89 MG/DL (ref 0–150)
URINE REFLEX TO CULTURE: ABNORMAL
URINE TYPE: ABNORMAL
UROBILINOGEN, URINE: 0.2 E.U./DL
VLDLC SERPL CALC-MCNC: 18 MG/DL
WBC UA: NORMAL /HPF (ref 0–5)

## 2021-06-26 PROCEDURE — 83605 ASSAY OF LACTIC ACID: CPT

## 2021-06-26 PROCEDURE — 6370000000 HC RX 637 (ALT 250 FOR IP): Performed by: INTERNAL MEDICINE

## 2021-06-26 PROCEDURE — 36600 WITHDRAWAL OF ARTERIAL BLOOD: CPT

## 2021-06-26 PROCEDURE — 84156 ASSAY OF PROTEIN URINE: CPT

## 2021-06-26 PROCEDURE — 80061 LIPID PANEL: CPT

## 2021-06-26 PROCEDURE — 1200000000 HC SEMI PRIVATE

## 2021-06-26 PROCEDURE — 83036 HEMOGLOBIN GLYCOSYLATED A1C: CPT

## 2021-06-26 PROCEDURE — 2700000000 HC OXYGEN THERAPY PER DAY

## 2021-06-26 PROCEDURE — 6360000002 HC RX W HCPCS: Performed by: INTERNAL MEDICINE

## 2021-06-26 PROCEDURE — 81001 URINALYSIS AUTO W/SCOPE: CPT

## 2021-06-26 PROCEDURE — 93306 TTE W/DOPPLER COMPLETE: CPT

## 2021-06-26 PROCEDURE — 82803 BLOOD GASES ANY COMBINATION: CPT

## 2021-06-26 PROCEDURE — 94760 N-INVAS EAR/PLS OXIMETRY 1: CPT

## 2021-06-26 PROCEDURE — 2500000003 HC RX 250 WO HCPCS: Performed by: INTERNAL MEDICINE

## 2021-06-26 PROCEDURE — 82947 ASSAY GLUCOSE BLOOD QUANT: CPT

## 2021-06-26 PROCEDURE — 80069 RENAL FUNCTION PANEL: CPT

## 2021-06-26 PROCEDURE — 71045 X-RAY EXAM CHEST 1 VIEW: CPT

## 2021-06-26 PROCEDURE — 2580000003 HC RX 258: Performed by: NURSE PRACTITIONER

## 2021-06-26 PROCEDURE — 2580000003 HC RX 258: Performed by: INTERNAL MEDICINE

## 2021-06-26 PROCEDURE — 84132 ASSAY OF SERUM POTASSIUM: CPT

## 2021-06-26 PROCEDURE — 84166 PROTEIN E-PHORESIS/URINE/CSF: CPT

## 2021-06-26 PROCEDURE — 36415 COLL VENOUS BLD VENIPUNCTURE: CPT

## 2021-06-26 PROCEDURE — 84295 ASSAY OF SERUM SODIUM: CPT

## 2021-06-26 PROCEDURE — 85014 HEMATOCRIT: CPT

## 2021-06-26 PROCEDURE — 83735 ASSAY OF MAGNESIUM: CPT

## 2021-06-26 PROCEDURE — 82330 ASSAY OF CALCIUM: CPT

## 2021-06-26 RX ORDER — HYDRALAZINE HYDROCHLORIDE 50 MG/1
50 TABLET, FILM COATED ORAL EVERY 8 HOURS SCHEDULED
Status: DISCONTINUED | OUTPATIENT
Start: 2021-06-26 | End: 2021-06-30

## 2021-06-26 RX ORDER — DEXTROSE MONOHYDRATE 100 MG/ML
INJECTION, SOLUTION INTRAVENOUS CONTINUOUS
Status: DISCONTINUED | OUTPATIENT
Start: 2021-06-27 | End: 2021-06-27

## 2021-06-26 RX ADMIN — INSULIN GLARGINE 15 UNITS: 100 INJECTION, SOLUTION SUBCUTANEOUS at 00:13

## 2021-06-26 RX ADMIN — DEXTROSE 15 G: 15 GEL ORAL at 22:41

## 2021-06-26 RX ADMIN — INSULIN LISPRO 2 UNITS: 100 INJECTION, SOLUTION INTRAVENOUS; SUBCUTANEOUS at 08:43

## 2021-06-26 RX ADMIN — ATORVASTATIN CALCIUM 40 MG: 40 TABLET, FILM COATED ORAL at 08:43

## 2021-06-26 RX ADMIN — SODIUM CHLORIDE, PRESERVATIVE FREE 10 ML: 5 INJECTION INTRAVENOUS at 08:47

## 2021-06-26 RX ADMIN — INSULIN LISPRO 10 UNITS: 100 INJECTION, SOLUTION INTRAVENOUS; SUBCUTANEOUS at 08:44

## 2021-06-26 RX ADMIN — INSULIN LISPRO 10 UNITS: 100 INJECTION, SOLUTION INTRAVENOUS; SUBCUTANEOUS at 18:19

## 2021-06-26 RX ADMIN — HYDRALAZINE HYDROCHLORIDE 10 MG: 20 INJECTION INTRAMUSCULAR; INTRAVENOUS at 08:48

## 2021-06-26 RX ADMIN — DEXTROSE MONOHYDRATE 12.5 G: 25 INJECTION, SOLUTION INTRAVENOUS at 22:41

## 2021-06-26 RX ADMIN — INSULIN LISPRO 10 UNITS: 100 INJECTION, SOLUTION INTRAVENOUS; SUBCUTANEOUS at 13:58

## 2021-06-26 RX ADMIN — FUROSEMIDE 40 MG: 10 INJECTION, SOLUTION INTRAMUSCULAR; INTRAVENOUS at 08:46

## 2021-06-26 RX ADMIN — FUROSEMIDE 40 MG: 10 INJECTION, SOLUTION INTRAMUSCULAR; INTRAVENOUS at 18:19

## 2021-06-26 RX ADMIN — ENOXAPARIN SODIUM 30 MG: 30 INJECTION SUBCUTANEOUS at 08:45

## 2021-06-26 RX ADMIN — NADOLOL 20 MG: 20 TABLET ORAL at 11:43

## 2021-06-26 RX ADMIN — INSULIN LISPRO 2 UNITS: 100 INJECTION, SOLUTION INTRAVENOUS; SUBCUTANEOUS at 13:57

## 2021-06-26 RX ADMIN — DEXTROSE MONOHYDRATE: 100 INJECTION, SOLUTION INTRAVENOUS at 23:36

## 2021-06-26 RX ADMIN — AMLODIPINE BESYLATE 5 MG: 5 TABLET ORAL at 08:43

## 2021-06-26 RX ADMIN — SODIUM CHLORIDE, PRESERVATIVE FREE 10 ML: 5 INJECTION INTRAVENOUS at 22:43

## 2021-06-26 RX ADMIN — HYDRALAZINE HYDROCHLORIDE 50 MG: 50 TABLET, FILM COATED ORAL at 14:59

## 2021-06-26 RX ADMIN — LEVOTHYROXINE SODIUM 25 MCG: 0.03 TABLET ORAL at 06:28

## 2021-06-26 RX ADMIN — INSULIN LISPRO 3 UNITS: 100 INJECTION, SOLUTION INTRAVENOUS; SUBCUTANEOUS at 00:13

## 2021-06-26 RX ADMIN — CLOPIDOGREL BISULFATE 75 MG: 75 TABLET ORAL at 08:43

## 2021-06-26 NOTE — ED NOTES
Report given to 4 N RN to assume care, all questions answered. Kelley fall/pain discussed. Pt is alert but will answer questions with yes or no answers and does not engage in full conversation. Family reports this is new within the last couple of weeks. Telemetry monitoring on pt. IV to right ac normal saline locked. Pt on 2 L NC at this time. No signs of acute distress noted. Pt is ambulatory with walker at home but recently has been weak due to swelling and pain in legs. Pt to room 4264 via stretcher with transport. Family with pt.       Josephine Pastor RN  06/25/21 2006

## 2021-06-26 NOTE — PROGRESS NOTES
Hospitalist Progress Note      PCP: Kurtis Canela MD    Date of Admission: 6/25/2021    Chief Complaint: Bilateral lower extremity edema, shortness of breath    Hospital Course: 71-year-old male with past medical history of hypertension, diabetes mellitus, hypothyroidism, history of peripheral arterial disease with right toe amputation, left TMA presented to the hospital with complains of bilateral leg swelling and shortness of breath. Noted to be in fluid overload    Subjective: No acute events reported overnight. Overall patient feels much improved as compared to yesterday. Lower extremity swelling has improved as well.   Breathing is stable      Medications:  Reviewed    Infusion Medications    sodium chloride      dextrose       Scheduled Medications    hydrALAZINE  50 mg Oral 3 times per day    nadolol  20 mg Oral Daily    insulin lispro  10 Units Subcutaneous TID WC    atorvastatin  40 mg Oral Daily    clopidogrel  75 mg Oral Daily    amLODIPine  5 mg Oral Daily    levothyroxine  25 mcg Oral QAM AC    sodium chloride flush  5-40 mL Intravenous 2 times per day    furosemide  40 mg Intravenous BID    enoxaparin  30 mg Subcutaneous Daily    insulin glargine  15 Units Subcutaneous Nightly    insulin lispro  0-12 Units Subcutaneous TID WC    insulin lispro  0-6 Units Subcutaneous Nightly     PRN Meds: docusate sodium, sodium chloride flush, sodium chloride, ondansetron **OR** ondansetron, polyethylene glycol, acetaminophen **OR** acetaminophen, perflutren lipid microspheres, hydrALAZINE, glucose, dextrose, glucagon (rDNA), dextrose      Intake/Output Summary (Last 24 hours) at 6/26/2021 1522  Last data filed at 6/26/2021 1309  Gross per 24 hour   Intake 720 ml   Output 1950 ml   Net -1230 ml       Physical Exam Performed:    BP (!) 173/78   Pulse 68   Temp 98 °F (36.7 °C) (Oral)   Resp 18   Ht 5' 4\" (1.626 m)   Wt 161 lb 6 oz (73.2 kg)   SpO2 98%   BMI 27.70 kg/m²     General appearance: No apparent distress, appears stated age and cooperative. HEENT: Pupils equal, round, and reactive to light. Conjunctivae/corneas clear. Neck: Supple, with full range of motion. No jugular venous distention. Trachea midline. Respiratory:  Normal respiratory effort. Clear to auscultation, bilaterally without Rales/Wheezes/Rhonchi. Cardiovascular: Regular rate and rhythm with normal S1/S2 without murmurs, rubs or gallops. Abdomen: Soft, non-tender, non-distended with normal bowel sounds. Musculoskeletal: Bilateral lower extremity edema improving  Skin: Skin color, texture, turgor normal.  No rashes or lesions. Neurologic:  Neurovascularly intact without any focal sensory/motor deficits. Cranial nerves: II-XII intact, grossly non-focal.  Psychiatric: Alert and oriented, thought content appropriate, normal insight  Capillary Refill: Brisk,3 seconds, normal   Peripheral Pulses: +2 palpable, equal bilaterally       Labs:   Recent Labs     06/25/21  1537   WBC 8.1   HGB 13.2*   HCT 39.5*        Recent Labs     06/25/21  1537 06/26/21  0638   * 136   K 4.6 4.1    103   CO2 22 21   BUN 52* 51*   CREATININE 3.1* 3.2*   CALCIUM 8.9 8.7     Recent Labs     06/25/21  1537   AST 11*   ALT 8*   BILITOT <0.2   ALKPHOS 113     No results for input(s): INR in the last 72 hours. Recent Labs     06/25/21  1537   TROPONINI 0.05*       Urinalysis:      Lab Results   Component Value Date    NITRU Negative 12/28/2018    WBCUA 1 12/28/2018    BACTERIA Rare 08/05/2011    RBCUA 1 12/28/2018    BLOODU TRACE 12/28/2018    SPECGRAV 1.011 12/28/2018    GLUCOSEU Negative 12/28/2018    GLUCOSEU Negative 12/20/2011       Radiology:  XR CHEST PORTABLE   Final Result   Subtle patchy opacities noted at the right lung base and left upper lung   which may reflect multifocal airspace disease.                  Assessment/Plan:    Active Hospital Problems    Diagnosis     Fluid overload [E87.70]         Bilateral lower extremity swelling with concern for fluid overload  Acute diastolic heart failure  -Continue Lasix 40 mg IV twice daily  -Echo with EF 40-07%, grade 1 diastolic dysfunction  -Nephrology following  -Strict input and output     Hypertension  -Continue amlodipine, nadolol, hydralazine     CKD stage IV  -Continue monitor creatinine     Diabetes mellitus  -Continue insulin sliding scale, glargine and lispro     Peripheral arterial disease  -Status post right second and third toe amputation, left TMA  -Continue Plavix, Lipitor     Hypothyroidism  -Continue Synthroid    DVT Prophylaxis: Lovenox  Diet: ADULT DIET; Regular; 3 carb choices (45 gm/meal);  Low Sodium (2 gm)  Code Status: Full Code    Dispo -continue IV diuresis, continue to monitor creatinine    Jennifer Woodward MD

## 2021-06-26 NOTE — PROGRESS NOTES
Department of Internal Medicine  Nephrology Progress Note    SUBJECTIVE:  We are following this patient for MELVIN . Patient progress reviewed. The patient feels better      REVIEW OF SYSTEMS:  No CP, stable SOB/HICKS     Physical Exam:    VITALS:  BP (!) 173/78   Pulse 68   Temp 98 °F (36.7 °C) (Oral)   Resp 18   Ht 5' 4\" (1.626 m)   Wt 161 lb 6 oz (73.2 kg)   SpO2 98%   BMI 27.70 kg/m²   24HR INTAKE/OUTPUT:      Intake/Output Summary (Last 24 hours) at 6/26/2021 1406  Last data filed at 6/26/2021 1309  Gross per 24 hour   Intake 720 ml   Output 1950 ml   Net -1230 ml       Constitutional:  Resting   Respiratory:  Decrease BS at bases   Gastrointestinal:  No  tenderness.   Normal Bowel Sounds  Cardiovascular:  S1, S2 RRR   Edema:  ++  edema    DATA:    CBC:  Lab Results   Component Value Date    WBC 8.1 06/25/2021    RBC 4.43 06/25/2021    HGB 13.2 06/25/2021    HCT 39.5 06/25/2021    MCV 89.2 06/25/2021    MCH 29.8 06/25/2021    MCHC 33.4 06/25/2021    RDW 15.0 06/25/2021     06/25/2021    MPV 8.4 06/25/2021     CMP:  Lab Results   Component Value Date     06/26/2021    K 4.1 06/26/2021    K 4.6 06/25/2021     06/26/2021    CO2 21 06/26/2021    BUN 51 06/26/2021    CREATININE 3.2 06/26/2021    GFRAA 22 06/26/2021    GFRAA >60 05/24/2013    AGRATIO 0.8 06/25/2021    LABGLOM 19 06/26/2021    GLUCOSE 166 06/26/2021    PROT 7.4 06/25/2021    PROT 7.0 10/05/2012    CALCIUM 8.7 06/26/2021    BILITOT <0.2 06/25/2021    ALKPHOS 113 06/25/2021    AST 11 06/25/2021    ALT 8 06/25/2021      Hepatic Function Panel:   Lab Results   Component Value Date    ALKPHOS 113 06/25/2021    ALT 8 06/25/2021    AST 11 06/25/2021    PROT 7.4 06/25/2021    PROT 7.0 10/05/2012    BILITOT <0.2 06/25/2021    BILIDIR <0.2 01/17/2020    IBILI see below 01/17/2020      Phosphorus:   Lab Results   Component Value Date    PHOS 4.0 04/30/2021       ASSESSMENT:  Active Problems:    Fluid overload  Resolved Problems:    * No resolved hospital problems. *      PLAN:  1. MELVIN Sec to cardio renal / uncontrol BP/DM  Will do w/u . Check UA, PVR. PTH. Phos . 2. Daily wt , strict I.O  Advised . 3. CHF on diuretics   4. HTN uncontrolled . meds adjusted . Target -140 mmHg   5. Anemia check iron studies / ferritin   6. CKD from DM/HTN/CHF .  Follows up with Dr Mahad Ko .   7. DM2 uncontrolled , plan per admitting team.   8. Plan dw pts family in detail     Tamica Harris MD, Gianna Palacios

## 2021-06-26 NOTE — PROGRESS NOTES
Pt arrived at 2020 via stretcher from ED to room 4280. VS, assessment, and admission complete. Daughter, Dipti Castellanos, is at bedside and answered questions. 4 eyes assessment complete. Pt oriented to unit and room. Pt is alert but is only answering questions with \"yes\" or \"no\" and is unable to engage fully. Pt arrived on 2 L oxygen nasal canula. Call light and bedside table in reach. All questions answered. Pt resting quietly in bed with no complaints or voiced needs at this time. Will continue to monitor.

## 2021-06-26 NOTE — PLAN OF CARE
Problem: Falls - Risk of:  Goal: Will remain free from falls  Description: Will remain free from falls  6/26/2021 1545 by Melany Garvey RN  Outcome: Ongoing  6/26/2021 0354 by Minerva Hernandez RN  Outcome: Ongoing  6/26/2021 0352 by Minerva Hernandez RN  Outcome: Ongoing  Goal: Absence of physical injury  Description: Absence of physical injury  6/26/2021 1545 by Melany Garvey RN  Outcome: Ongoing  6/26/2021 0354 by Minerva Hernandez RN  Outcome: Ongoing  6/26/2021 0352 by Minerva Hernandez RN  Outcome: Ongoing     Problem: Skin Integrity:  Goal: Will show no infection signs and symptoms  Description: Will show no infection signs and symptoms  6/26/2021 1545 by Melany Garvey RN  Outcome: Ongoing  6/26/2021 0354 by Minerva Hernandez RN  Outcome: Ongoing  6/26/2021 0352 by Minerva Hernandez RN  Outcome: Ongoing     Problem: Skin Integrity:  Goal: Absence of new skin breakdown  Description: Absence of new skin breakdown  6/26/2021 1545 by Melany Garvey RN  Outcome: Ongoing  6/26/2021 0354 by Minerva Hernandez RN  Outcome: Ongoing  6/26/2021 0352 by Minerva Hernandez RN  Outcome: Ongoing     Problem: Pain:  Goal: Pain level will decrease  Description: Pain level will decrease  6/26/2021 1545 by Melany Garvey RN  Outcome: Ongoing  6/26/2021 0354 by Minerva Hernandez RN  Outcome: Ongoing  6/26/2021 0352 by Minerva Hernandez RN  Outcome: Ongoing  Goal: Control of acute pain  Description: Control of acute pain  6/26/2021 1545 by Melany Garvey RN  Outcome: Ongoing  6/26/2021 0354 by Minerva Hernandez RN  Outcome: Ongoing  6/26/2021 0352 by Minerva Hernandez RN  Outcome: Ongoing  Goal: Control of chronic pain  Description: Control of chronic pain  6/26/2021 1545 by Melany Garvey RN  Outcome: Ongoing  6/26/2021 0354 by Minerva Hernandez RN  Outcome: Ongoing  6/26/2021 0352 by Minerva Hernandez RN  Outcome: Ongoing

## 2021-06-26 NOTE — PLAN OF CARE
Problem: Falls - Risk of:  Goal: Will remain free from falls  Description: Will remain free from falls  6/26/2021 0354 by Loc Adams RN  Outcome: Ongoing     Problem: Falls - Risk of:  Goal: Absence of physical injury  Description: Absence of physical injury  6/26/2021 0354 by Loc Adams RN  Outcome: Ongoing     Problem: Skin Integrity:  Goal: Will show no infection signs and symptoms  Description: Will show no infection signs and symptoms  6/26/2021 0354 by Loc Adams RN  Outcome: Ongoing     Problem: Skin Integrity:  Goal: Absence of new skin breakdown  Description: Absence of new skin breakdown  6/26/2021 0354 by Loc Adams RN  Outcome: Ongoing     Problem: Pain:  Goal: Pain level will decrease  Description: Pain level will decrease  6/26/2021 0354 by Loc Adams RN  Outcome: Ongoing     Problem: Pain:  Goal: Control of acute pain  Description: Control of acute pain  6/26/2021 0354 by Loc Adams RN  Outcome: Ongoing     Problem: Pain:  Goal: Control of chronic pain  Description: Control of chronic pain  6/26/2021 0354 by Loc Adams RN  Outcome: Ongoing

## 2021-06-27 LAB
ANION GAP SERPL CALCULATED.3IONS-SCNC: 15 MMOL/L (ref 3–16)
BUN BLDV-MCNC: 55 MG/DL (ref 7–20)
CALCIUM SERPL-MCNC: 8.9 MG/DL (ref 8.3–10.6)
CHLORIDE BLD-SCNC: 103 MMOL/L (ref 99–110)
CO2: 23 MMOL/L (ref 21–32)
CREAT SERPL-MCNC: 3.4 MG/DL (ref 0.8–1.3)
FERRITIN: 232.5 NG/ML (ref 30–400)
GFR AFRICAN AMERICAN: 21
GFR NON-AFRICAN AMERICAN: 17
GLUCOSE BLD-MCNC: 105 MG/DL (ref 70–99)
GLUCOSE BLD-MCNC: 108 MG/DL (ref 70–99)
GLUCOSE BLD-MCNC: 207 MG/DL (ref 70–99)
GLUCOSE BLD-MCNC: 236 MG/DL (ref 70–99)
GLUCOSE BLD-MCNC: 257 MG/DL (ref 70–99)
GLUCOSE BLD-MCNC: 261 MG/DL (ref 70–99)
GLUCOSE BLD-MCNC: 292 MG/DL (ref 70–99)
GLUCOSE BLD-MCNC: 70 MG/DL (ref 70–99)
GLUCOSE BLD-MCNC: 91 MG/DL (ref 70–99)
HCT VFR BLD CALC: 38.2 % (ref 40.5–52.5)
HEMOGLOBIN: 12.7 G/DL (ref 13.5–17.5)
IRON SATURATION: 21 % (ref 20–50)
IRON: 39 UG/DL (ref 59–158)
MAGNESIUM: 1.9 MG/DL (ref 1.8–2.4)
MCH RBC QN AUTO: 29.6 PG (ref 26–34)
MCHC RBC AUTO-ENTMCNC: 33.4 G/DL (ref 31–36)
MCV RBC AUTO: 88.6 FL (ref 80–100)
PARATHYROID HORMONE INTACT: 206.9 PG/ML (ref 14–72)
PDW BLD-RTO: 15.3 % (ref 12.4–15.4)
PERFORMED ON: ABNORMAL
PERFORMED ON: NORMAL
PERFORMED ON: NORMAL
PLATELET # BLD: 222 K/UL (ref 135–450)
PMV BLD AUTO: 8.6 FL (ref 5–10.5)
POTASSIUM SERPL-SCNC: 4.1 MMOL/L (ref 3.5–5.1)
PRO-BNP: ABNORMAL PG/ML (ref 0–449)
PROCALCITONIN: 0.19 NG/ML (ref 0–0.15)
RBC # BLD: 4.31 M/UL (ref 4.2–5.9)
SODIUM BLD-SCNC: 141 MMOL/L (ref 136–145)
TOTAL IRON BINDING CAPACITY: 186 UG/DL (ref 260–445)
WBC # BLD: 11.5 K/UL (ref 4–11)

## 2021-06-27 PROCEDURE — 2500000003 HC RX 250 WO HCPCS: Performed by: NURSE PRACTITIONER

## 2021-06-27 PROCEDURE — 87077 CULTURE AEROBIC IDENTIFY: CPT

## 2021-06-27 PROCEDURE — 82728 ASSAY OF FERRITIN: CPT

## 2021-06-27 PROCEDURE — 83735 ASSAY OF MAGNESIUM: CPT

## 2021-06-27 PROCEDURE — 2580000003 HC RX 258: Performed by: NURSE PRACTITIONER

## 2021-06-27 PROCEDURE — 86403 PARTICLE AGGLUT ANTBDY SCRN: CPT

## 2021-06-27 PROCEDURE — 6370000000 HC RX 637 (ALT 250 FOR IP): Performed by: INTERNAL MEDICINE

## 2021-06-27 PROCEDURE — 1200000000 HC SEMI PRIVATE

## 2021-06-27 PROCEDURE — 87186 SC STD MICRODIL/AGAR DIL: CPT

## 2021-06-27 PROCEDURE — 2700000000 HC OXYGEN THERAPY PER DAY

## 2021-06-27 PROCEDURE — 83970 ASSAY OF PARATHORMONE: CPT

## 2021-06-27 PROCEDURE — 84145 PROCALCITONIN (PCT): CPT

## 2021-06-27 PROCEDURE — 87070 CULTURE OTHR SPECIMN AEROBIC: CPT

## 2021-06-27 PROCEDURE — 80048 BASIC METABOLIC PNL TOTAL CA: CPT

## 2021-06-27 PROCEDURE — 84155 ASSAY OF PROTEIN SERUM: CPT

## 2021-06-27 PROCEDURE — 87449 NOS EACH ORGANISM AG IA: CPT

## 2021-06-27 PROCEDURE — 83880 ASSAY OF NATRIURETIC PEPTIDE: CPT

## 2021-06-27 PROCEDURE — 87205 SMEAR GRAM STAIN: CPT

## 2021-06-27 PROCEDURE — 6360000002 HC RX W HCPCS: Performed by: NURSE PRACTITIONER

## 2021-06-27 PROCEDURE — 6360000002 HC RX W HCPCS: Performed by: INTERNAL MEDICINE

## 2021-06-27 PROCEDURE — 84165 PROTEIN E-PHORESIS SERUM: CPT

## 2021-06-27 PROCEDURE — 85027 COMPLETE CBC AUTOMATED: CPT

## 2021-06-27 PROCEDURE — 51798 US URINE CAPACITY MEASURE: CPT

## 2021-06-27 PROCEDURE — 83540 ASSAY OF IRON: CPT

## 2021-06-27 PROCEDURE — 2580000003 HC RX 258: Performed by: INTERNAL MEDICINE

## 2021-06-27 PROCEDURE — 83550 IRON BINDING TEST: CPT

## 2021-06-27 PROCEDURE — 94760 N-INVAS EAR/PLS OXIMETRY 1: CPT

## 2021-06-27 PROCEDURE — 36415 COLL VENOUS BLD VENIPUNCTURE: CPT

## 2021-06-27 RX ORDER — AMLODIPINE BESYLATE 10 MG/1
10 TABLET ORAL DAILY
Status: DISCONTINUED | OUTPATIENT
Start: 2021-06-28 | End: 2021-06-27

## 2021-06-27 RX ORDER — AMLODIPINE BESYLATE 5 MG/1
5 TABLET ORAL ONCE
Status: COMPLETED | OUTPATIENT
Start: 2021-06-27 | End: 2021-06-27

## 2021-06-27 RX ORDER — NIFEDIPINE 30 MG/1
30 TABLET, EXTENDED RELEASE ORAL DAILY
Status: DISCONTINUED | OUTPATIENT
Start: 2021-06-27 | End: 2021-07-01 | Stop reason: HOSPADM

## 2021-06-27 RX ADMIN — INSULIN LISPRO 4 UNITS: 100 INJECTION, SOLUTION INTRAVENOUS; SUBCUTANEOUS at 17:56

## 2021-06-27 RX ADMIN — SODIUM CHLORIDE, PRESERVATIVE FREE 10 ML: 5 INJECTION INTRAVENOUS at 21:00

## 2021-06-27 RX ADMIN — FUROSEMIDE 40 MG: 10 INJECTION, SOLUTION INTRAMUSCULAR; INTRAVENOUS at 17:56

## 2021-06-27 RX ADMIN — HYDRALAZINE HYDROCHLORIDE 50 MG: 50 TABLET, FILM COATED ORAL at 21:02

## 2021-06-27 RX ADMIN — CLOPIDOGREL BISULFATE 75 MG: 75 TABLET ORAL at 08:49

## 2021-06-27 RX ADMIN — NADOLOL 20 MG: 20 TABLET ORAL at 08:54

## 2021-06-27 RX ADMIN — ENOXAPARIN SODIUM 30 MG: 30 INJECTION SUBCUTANEOUS at 08:48

## 2021-06-27 RX ADMIN — METRONIDAZOLE 500 MG: 500 INJECTION, SOLUTION INTRAVENOUS at 10:21

## 2021-06-27 RX ADMIN — AMLODIPINE BESYLATE 5 MG: 5 TABLET ORAL at 08:49

## 2021-06-27 RX ADMIN — INSULIN LISPRO 2 UNITS: 100 INJECTION, SOLUTION INTRAVENOUS; SUBCUTANEOUS at 21:02

## 2021-06-27 RX ADMIN — NIFEDIPINE 30 MG: 30 TABLET, FILM COATED, EXTENDED RELEASE ORAL at 15:22

## 2021-06-27 RX ADMIN — CEFEPIME HYDROCHLORIDE 1000 MG: 1 INJECTION, POWDER, FOR SOLUTION INTRAMUSCULAR; INTRAVENOUS at 03:27

## 2021-06-27 RX ADMIN — ATORVASTATIN CALCIUM 40 MG: 40 TABLET, FILM COATED ORAL at 08:49

## 2021-06-27 RX ADMIN — METRONIDAZOLE 500 MG: 500 INJECTION, SOLUTION INTRAVENOUS at 02:13

## 2021-06-27 RX ADMIN — INSULIN LISPRO 6 UNITS: 100 INJECTION, SOLUTION INTRAVENOUS; SUBCUTANEOUS at 12:14

## 2021-06-27 RX ADMIN — FUROSEMIDE 40 MG: 10 INJECTION, SOLUTION INTRAMUSCULAR; INTRAVENOUS at 08:48

## 2021-06-27 RX ADMIN — METRONIDAZOLE 500 MG: 500 INJECTION, SOLUTION INTRAVENOUS at 17:56

## 2021-06-27 RX ADMIN — AMLODIPINE BESYLATE 5 MG: 5 TABLET ORAL at 10:26

## 2021-06-27 RX ADMIN — HYDRALAZINE HYDROCHLORIDE 50 MG: 50 TABLET, FILM COATED ORAL at 15:22

## 2021-06-27 ASSESSMENT — PAIN SCALES - GENERAL
PAINLEVEL_OUTOF10: 0

## 2021-06-27 NOTE — SIGNIFICANT EVENT
Hospital Medicine Progress Update    Patient: Mariola Irby  MRN: [id number]    Gender: male : 1935 Age: 81 y.o. Code Status: Full Code    Brief Note: Called tonight by patient's nurse with concerns of low blood sugar with altered mental status. On arrival to room patient is on responsive, diaphoretic with low blood sugar. Rapid response was called. Patient was admitted 2 days ago and being treated for fluid overload. Patient was given his prandial insulin and per son at the bedside ate about 70% of his meal tray. When the tech came in to perform blood sugar check, blood sugar was found to be 22. ROS:  Review of systems not obtained due to patient factors. Unresponsive, patient slightly opens eyes to tactile stimuli but is nonverbal.    Vitals:    Vitals:    21 2318   BP: (!) 172/74   Pulse: (!) 47   Resp: 24   Temp:    SpO2: 94%       Pertinent exam:    Gen: Unresponsive male lying on hospital bed pale, diaphoretic with blood sugar in the 20s,  Neck: supple, trachea midline, no anterior cervical or SC LAD  Heart:  Normal s1/s2, RRR, no murmurs, gallops, or rubs. 2+ bilateral lower leg edema  Lungs: Respirations labored, diminished on the right with rhonchi concern for aspiration. Abd: bowel sounds present, soft, nontender, nondistended, no masses  Extrem:  no cyanosis,  2+ edema, peripheral pulses 2+, sluggish capillary refill  Skin: no rashes or lesions, poor color/perfusion  Psych:  Unresponsive secondary to hypoglycemia     Neuro: Unable to fully assess due to unresponsiveness.       Patient Active Problem List   Diagnosis    Mixed hyperlipidemia    Essential hypertension, benign    Type 2 diabetes mellitus with renal manifestations [facility]    Prostate cancer ([facility]    Osteoarthritis of left knee    Ulcer of foot ([facility]    Carotid artery stenosis    Atherosclerosis of leg with intermittent claudication (HCC)    Gangrene of toe (HCC)    Atherosclerosis of native arteries of the extremities with ulceration ([address]    Osteomyelitis of foot, left, acute ([address]    Callus    Atherosclerosis of native artery of both lower extremities with intermittent claudication (HCC)    Acquired hypothyroidism    Leg swelling    Fluid overload         Assessment/Plan:  Hypoglycemia  Aspiration     Active Problems:    Fluid overload  Resolved Problems:    * No resolved hospital problems. *      1. Patient was found to be hypoglycemic with blood sugars in the 20s. Patient was given oral glucose followed by half amp of dextrose. After 20-30 minutes he did have improvement in his level of arousability. Prandial insulin will be held at this time, will also hold Lantus this evening. Patient was started on D10 at 25 an hour as he is admitted with volume overload. Patient is now opening eyes and looking around the room. Son reports patient is not very verbal.  He is spontaneously opening his eyes without stimulation. Will repeat Accu-Chek in 1 hour. Chest x-ray was obtained as there is concern for aspiration. CXR: Right basilar atelectasis, aspiration or pneumonia. In light of episode patient will be started on cefepime and Flagyl (PCN allergy)     I spent 30 minutes providing critical care for this episode of hypoglycemia followed by aspiration. This time is excluding time spent performing procedures.           Electronically signed by JASMIN Knott CNP on 6/26/2021 at 11:33 PM

## 2021-06-27 NOTE — CODE DOCUMENTATION
Pt opening eyes but appears drowsy. Nods yes/no appropriately but is not talking. Pt's son endorsed that pt at baseline \"does not talk a lot. \"

## 2021-06-27 NOTE — FLOWSHEET NOTE
Patient is responding and talking and per family is back to his normal. Patient is resting. Will continue monitor.

## 2021-06-27 NOTE — CODE DOCUMENTATION
Pt with gurgling sound, RT suctioned. Pt has weak, wet cough. Thick tan secretions obtained. NP encouraging pt to cough. Concerned pt may have aspirated.

## 2021-06-27 NOTE — PLAN OF CARE
Problem: Falls - Risk of:  Goal: Will remain free from falls  Description: Will remain free from falls  6/27/2021 0322 by Marino Allen RN  Outcome: Ongoing  6/27/2021 0320 by Marino Allen RN  Outcome: Ongoing  6/26/2021 1545 by Ronald Romero RN  Outcome: Ongoing  Goal: Absence of physical injury  Description: Absence of physical injury  6/27/2021 0322 by Marino Allen RN  Outcome: Ongoing  6/27/2021 0320 by Marino Allen RN  Outcome: Ongoing  6/26/2021 1545 by Ronald Romero RN  Outcome: Ongoing     Problem: Skin Integrity:  Goal: Will show no infection signs and symptoms  Description: Will show no infection signs and symptoms  6/27/2021 0322 by Marino Allen RN  Outcome: Ongoing  6/27/2021 0320 by Marino Allen RN  Outcome: Ongoing  6/26/2021 1545 by Ronald Romero RN  Outcome: Ongoing  Goal: Absence of new skin breakdown  Description: Absence of new skin breakdown  6/27/2021 0322 by Marino Allen RN  Outcome: Ongoing  6/27/2021 0320 by Marino Allen RN  Outcome: Ongoing  6/26/2021 1545 by Ronald Romero RN  Outcome: Ongoing     Problem: Pain:  Goal: Pain level will decrease  Description: Pain level will decrease  6/27/2021 0322 by Marino Allen RN  Outcome: Ongoing  6/27/2021 0320 by Marino Allen RN  Outcome: Ongoing  6/26/2021 1545 by Ronald Romero RN  Outcome: Ongoing  Goal: Control of acute pain  Description: Control of acute pain  6/27/2021 0322 by Marino Allen RN  Outcome: Ongoing  6/27/2021 0320 by Marino Allen RN  Outcome: Ongoing  6/26/2021 1545 by Ronald Romero RN  Outcome: Ongoing  Goal: Control of chronic pain  Description: Control of chronic pain  6/27/2021 0322 by Marino Allen RN  Outcome: Ongoing  6/27/2021 0320 by Marino Allen RN  Outcome: Ongoing  6/26/2021 1545 by Ronald Romero RN  Outcome: Ongoing     Problem: OXYGENATION/RESPIRATORY FUNCTION  Goal: Patient will maintain patent airway  Outcome: Ongoing  Goal: Patient will achieve/maintain normal respiratory rate/effort  Description: Respiratory rate and effort will be within normal limits for the patient  Outcome: Ongoing     Problem: HEMODYNAMIC STATUS  Goal: Patient has stable vital signs and fluid balance  Outcome: Ongoing     Problem: FLUID AND ELECTROLYTE IMBALANCE  Goal: Fluid and electrolyte balance are achieved/maintained  Outcome: Ongoing     Problem: ACTIVITY INTOLERANCE/IMPAIRED MOBILITY  Goal: Mobility/activity is maintained at optimum level for patient  Outcome: Ongoing     Problem: Serum Glucose Level - Abnormal:  Goal: Ability to maintain appropriate glucose levels has stabilized  Description: Ability to maintain appropriate glucose levels has stabilized  Outcome: Not Met This Shift

## 2021-06-27 NOTE — PROGRESS NOTES
Department of Internal Medicine  Nephrology Progress Note    SUBJECTIVE:  We are following this patient for MELVIN . Patient progress reviewed. The patient feels better  . Events noted . REVIEW OF SYSTEMS:  No CP, stable SOB/HICKS   Family at bedside   Physical Exam:    VITALS:  BP (!) 163/69   Pulse 61   Temp 97.6 °F (36.4 °C) (Axillary)   Resp 18   Ht 5' 4\" (1.626 m)   Wt 160 lb 11.5 oz (72.9 kg)   SpO2 96%   BMI 27.59 kg/m²   24HR INTAKE/OUTPUT:      Intake/Output Summary (Last 24 hours) at 6/27/2021 1355  Last data filed at 6/27/2021 1053  Gross per 24 hour   Intake 540 ml   Output 1500 ml   Net -960 ml       Constitutional:  Resting   Respiratory:  Decrease BS at bases / rhonchi   Gastrointestinal:  No  tenderness.   Normal Bowel Sounds  Cardiovascular:  S1, S2 RRR   Edema:  ++  edema    DATA:    CBC:  Lab Results   Component Value Date    WBC 11.5 06/27/2021    RBC 4.31 06/27/2021    HGB 12.7 06/27/2021    HCT 38.2 06/27/2021    MCV 88.6 06/27/2021    MCH 29.6 06/27/2021    MCHC 33.4 06/27/2021    RDW 15.3 06/27/2021     06/27/2021    MPV 8.6 06/27/2021     CMP:  Lab Results   Component Value Date     06/27/2021    K 4.1 06/27/2021    K 4.6 06/25/2021     06/27/2021    CO2 23 06/27/2021    BUN 55 06/27/2021    CREATININE 3.4 06/27/2021    GFRAA 21 06/27/2021    GFRAA >60 05/24/2013    AGRATIO 0.8 06/25/2021    LABGLOM 17 06/27/2021    GLUCOSE 108 06/27/2021    PROT 6.3 06/27/2021    PROT 7.0 10/05/2012    CALCIUM 8.9 06/27/2021    BILITOT <0.2 06/25/2021    ALKPHOS 113 06/25/2021    AST 11 06/25/2021    ALT 8 06/25/2021      Hepatic Function Panel:   Lab Results   Component Value Date    ALKPHOS 113 06/25/2021    ALT 8 06/25/2021    AST 11 06/25/2021    PROT 6.3 06/27/2021    PROT 7.0 10/05/2012    BILITOT <0.2 06/25/2021    BILIDIR <0.2 01/17/2020    IBILI see below 01/17/2020      Phosphorus:   Lab Results   Component Value Date    PHOS 4.4 06/26/2021       ASSESSMENT:  Active Problems:    Fluid overload  Resolved Problems:    * No resolved hospital problems. *      PLAN:  1. MELVNI Sec to cardio renal / uncontrol BP/DM  Renal function unchanged , UOP/wt noted   2. Daily wt , strict I.O  Advised . 3. CHF on diuretics   4. HTN uncontrolled . meds adjusted . Target -140 mmHg   5. Anemia check iron studies / ferritin   6. CKD from DM/HTN/CHF . Follows up with Dr Gwendolyn Le .   7. DM2 uncontrolled , plan per admitting team.   8. Pneumonia on abx   9.  Plan dw pts family in detail     Ross De MD, Jazmin Ballard

## 2021-06-27 NOTE — CONSULTS
[address] [address]                                  CONSULTATION    PATIENT NAME: Diya Ogden                     :        1935  MED REC NO:   [id number]                          ROOM:       4341  ACCOUNT NO:   [de-identified]                           ADMIT DATE: 2021  PROVIDER:     Aretha Grier MD    CONSULT DATE:  2021    REASON FOR CONSULTATION:  Acute kidney injury. HISTORY OF PRESENT ILLNESS:  An 29-year-old  gentleman with  past medical history significant for diabetes mellitus type 2,  hypertension, peripheral vascular disease, chronic kidney disease stage  IV, follows up with Dr. Roxann Olivares, brought to ER by the family because of  uncontrolled diabetes and blood pressure with worsening lower extremity  edema and some shortness of breath and dyspnea on exertion. At the time  of consultation, the patient was found to be in acute kidney injury most  likely secondary to the above three mentioned issues. Admitted for  further workup and management and renal consultation has been called for  acute kidney injury. At the time of consultation, the patient is feeling and breathing  better. Denies any chest pain and with improvement in shortness of  breath and dyspnea on exertion. Patient is a poor historian. Most of  the information obtained from the record as well as discussing with his  family who is present at the bedside. Denies any urinary symptoms. PAST MEDICAL HISTORY:  1. Chronic kidney disease stage IV. Follows up with Dr. Roxann Olivares.  2.  Diabetes mellitus type 2.  3.  Anemia of chronic kidney disease. 4.  Peripheral vascular disease. 5.  Hypothyroidism. PAST SURGICAL HISTORY:  1.  Status post colonoscopy. 2.  Status post cardiac catheterization. 3.  Status post left transmetatarsal amputation. 4.  Status post right second toe amputation.     OUTPATIENT MEDICATIONS:  Include Demadex, Synthroid, Norvasc, Plavix,  Lipitor, Lantus insulin, _____, Colace. ALLERGIES:  PENICILLIN. SOCIAL HISTORY:  He does not smoke or drink per record. FAMILY HISTORY:  Significant for diabetes and coronary artery disease  per record. REVIEW OF SYSTEMS:  Very, very limited but patient is hard of hearing. No vision problem. Denies any nausea or vomiting or chest pain. Admits  shortness of breath and dyspnea on exertion with worsening lower  extremity edema. Feeling weak, tired, decreased level of energy. PHYSICAL EXAMINATION:  GENERAL:  Lying in bed, looks comfortable. VITAL SIGNS:  Blood pressure 173/78, pulse 77, temperature 97.8. HEENT:  Pupils reactive to light. CHEST:  Decreased breath sounds in both bases. CARDIOVASCULAR:  S1, S2 audible. Regular rate and rhythm. ABDOMEN:  Soft, nontender. Positive bowel sounds. EXTREMITIES:  2+ edema. CNS:  Nonfocal.    LABORATORY DATA:  Sodium 136, potassium 4.1, chloride 103, bicarb 21,  BUN 51, creatinine 3.2. WBC 8.1, hemoglobin 13.2. IMPRESSION:  1. Acute kidney injury most likely secondary to cardiorenal,  uncontrolled diabetes, uncontrolled blood pressure. 2.  Chronic kidney disease stage IV secondary to congestive heart  failure, hypertension, diabetes mellitus type 2, age. 3.  Shortness of breath secondary to congestive heart failure. PLAN:  1. Daily weight. 2.  Strict I's and O's.  3.  Continue with cautious diuresis. 4.  Blood pressure medication adjusted. 5.  Check phosphorus and PTH. 6.  Check the postvoid residual.  7.  Diabetes management per admitting team.    We will follow the patient from a renal standpoint. Case discussed in  detail with the patient and family present at the bedside.         Manjula Alexander MD    D: 06/26/2021 14:14:02       T: 06/27/2021 1:41:02     [email or web address]  Job#: [id number]     Doc#: [id number]    CC:

## 2021-06-27 NOTE — CODE DOCUMENTATION
Prior to Rapid being called:    Pt received oral glucose then appeared to not be tolerating it (gurgling) so bedside RN administered 1/2 amp D50 before rapid was called. Pt increased from 1 L NC up to 6 L NC because he was noted to desaturate.

## 2021-06-27 NOTE — CODE DOCUMENTATION
Pt's FSBS at  was 110. He received 10 units prandial humalog. Pt's son said he ate \"approximately 70% of his dinner. \"

## 2021-06-27 NOTE — PROGRESS NOTES
Hospitalist Progress Note      PCP: Yudy Ruiz MD    Date of Admission: 6/25/2021    Chief Complaint: Bilateral lower extremity edema, shortness of breath    Hospital Course: 64-year-old male with past medical history of hypertension, diabetes mellitus, hypothyroidism, history of peripheral arterial disease with right toe amputation, left TMA presented to the hospital with complains of bilateral leg swelling and shortness of breath. Noted to be in fluid overload    Subjective: Had hypoglycemia episode overnight. Per family, he rarely takes more than 5U prandial insulin even though he is prescribed 10U. Was placed on D10 and blood glucose improved overnight. Also had worsening hypoxia and CXR showed atelectasis vs pneumonia and he was started on cefepime and Flagyl. He feels better today than yesterday. Weaned down to 1L.       Medications:  Reviewed    Infusion Medications    sodium chloride      dextrose       Scheduled Medications    metroNIDAZOLE  500 mg Intravenous Q8H    cefepime  1,000 mg Intravenous Q24H    [START ON 6/28/2021] amLODIPine  10 mg Oral Daily    hydrALAZINE  50 mg Oral 3 times per day    nadolol  20 mg Oral Daily    atorvastatin  40 mg Oral Daily    clopidogrel  75 mg Oral Daily    levothyroxine  25 mcg Oral QAM AC    sodium chloride flush  5-40 mL Intravenous 2 times per day    furosemide  40 mg Intravenous BID    enoxaparin  30 mg Subcutaneous Daily    insulin lispro  0-12 Units Subcutaneous TID WC    insulin lispro  0-6 Units Subcutaneous Nightly     PRN Meds: docusate sodium, sodium chloride flush, sodium chloride, ondansetron **OR** ondansetron, polyethylene glycol, acetaminophen **OR** acetaminophen, perflutren lipid microspheres, hydrALAZINE, glucose, dextrose, glucagon (rDNA), dextrose      Intake/Output Summary (Last 24 hours) at 6/27/2021 1052  Last data filed at 6/27/2021 0913  Gross per 24 hour   Intake 660 ml   Output 1500 ml   Net -840 ml       Physical Exam Performed:    BP (!) 198/82   Pulse 71   Temp 98.3 °F (36.8 °C) (Oral)   Resp 18   Ht 5' 4\" (1.626 m)   Wt 160 lb 11.5 oz (72.9 kg)   SpO2 94%   BMI 27.59 kg/m²     General appearance: No apparent distress, appears stated age and cooperative. HEENT: Pupils equal, round, and reactive to light. Conjunctivae/corneas clear. Neck: Supple, with full range of motion. No jugular venous distention. Trachea midline. Respiratory:  Normal respiratory effort. Bibasilar crackles. Cardiovascular: Regular rate and rhythm with normal S1/S2 without murmurs, rubs or gallops. Abdomen: Soft, non-tender, non-distended with normal bowel sounds. Musculoskeletal: Bilateral lower extremity edema improving, still 2+. Amputations noted right and left feet. Skin: Skin color, texture, turgor normal.  No rashes or lesions. Neurologic:  Neurovascularly intact without any focal sensory/motor deficits. Cranial nerves: II-XII intact, grossly non-focal.  Psychiatric: Alert and oriented, thought content appropriate, normal insight  Capillary Refill: Brisk,3 seconds, normal   Peripheral Pulses: +2 palpable, equal bilaterally       Labs:   Recent Labs     06/25/21  1537 06/26/21  2312 06/27/21  0810   WBC 8.1  --  11.5*   HGB 13.2* 13.2* 12.7*   HCT 39.5*  --  38.2*     --  222     Recent Labs     06/25/21  1537 06/26/21  0638 06/27/21  0810   * 137  136 141   K 4.6 4.2  4.1 4.1    104  103 103   CO2 22 18*  21 23   BUN 52* 55*  51* 55*   CREATININE 3.1* 3.3*  3.2* 3.4*   CALCIUM 8.9 8.8  8.7 8.9   PHOS  --  4.4  --      Recent Labs     06/25/21  1537   AST 11*   ALT 8*   BILITOT <0.2   ALKPHOS 113     No results for input(s): INR in the last 72 hours.   Recent Labs     06/25/21  1537   TROPONINI 0.05*       Urinalysis:      Lab Results   Component Value Date    NITRU Negative 06/26/2021    WBCUA 0-2 06/26/2021    BACTERIA Rare 08/05/2011    RBCUA 0-2 06/26/2021    BLOODU Negative 06/26/2021    SPECGRAV 1.010 06/26/2021    GLUCOSEU 100 06/26/2021    GLUCOSEU Negative 12/20/2011       Radiology:  XR CHEST PORTABLE   Final Result   Right basilar atelectasis, aspiration or pneumonia. XR CHEST PORTABLE   Final Result   Subtle patchy opacities noted at the right lung base and left upper lung   which may reflect multifocal airspace disease. Assessment/Plan:    Active Hospital Problems    Diagnosis     Fluid overload [E87.70]         Bilateral lower extremity swelling with concern for fluid overload  Acute diastolic heart failure  -Continue Lasix 40 mg IV twice daily  -Echo with EF 31-09%, grade 1 diastolic dysfunction  -Nephrology following  -Strict input and output    Acute respiratory failure with hypoxia - likely 2/2 fluid overload above  -continue diuresis as above  -wean oxygen to maintain SpO2 > 89%  -check procalcitonin  -Strep pneumo, Legionella urine antigen  -respiratory culture  -on empiric cefepime/Flagyl, will discontinue if workup is negative     Hypertension  -Continue amlodipine, nadolol, hydralazine  -increase Norvasc dose     CKD stage IV  -Continue monitor creatinine, slightly worsening     Diabetes mellitus, with hypoglycemic episode overnight  -stop D10  -hold scheduled Humalog and Lantus  -continue SSI  -hypoglycemia protocol  -POCT glucose checks  -carb control diet     Peripheral arterial disease  -Status post right second and third toe amputation, left TMA  -Continue Plavix, Lipitor     Hypothyroidism  -Continue Synthroid    DVT Prophylaxis: Lovenox  Diet: ADULT DIET; Regular; 3 carb choices (45 gm/meal);  Low Sodium (2 gm); 1500 ml  Code Status: Full Code    Dispo -continue IV diuresis, continue to monitor creatinine    Live Molina MD

## 2021-06-27 NOTE — PLAN OF CARE
Problem: Falls - Risk of:  Goal: Will remain free from falls  Description: Will remain free from falls  6/27/2021 1102 by Harini Berger RN  Outcome: Ongoing  Note: Pt free from falls this shift. Non skid socks provided. Bed and chair alarm used. Pt educated on use of call light as needed for assistance. Call light always within reach. Pt able and agreeable to contact for safety appropriately. Problem: Falls - Risk of:  Goal: Absence of physical injury  Description: Absence of physical injury  6/27/2021 1102 by Harini Berger RN  Outcome: Ongoing     Problem: Skin Integrity:  Goal: Will show no infection signs and symptoms  Description: Will show no infection signs and symptoms  6/27/2021 1102 by Harini Berger RN  Outcome: Ongoing  Note: Skin assessment performed each shift per protocol. Pt encouraged to reposition often. Will continue to monitor and assess for skin breakdown. 6/27/2021 0322 by Savanna Jones RN  Outcome: Ongoing  6/27/2021 0320 by Savanna Jones RN  Outcome: Ongoing     Problem: Skin Integrity:  Goal: Absence of new skin breakdown  Description: Absence of new skin breakdown  6/27/2021 1102 by Harini Berger RN  Outcome: Ongoing     Problem: Pain:  Goal: Pain level will decrease  Description: Pain level will decrease  6/27/2021 1102 by Harini Berger RN  Outcome: Ongoing  Note: Pt able to express presence and absence of pain using numerical pain scale. Pt pain is managed by PRN analgesics as ordered by MD. Pain reassess after each interventions. Will continue to monitor as needed.      6/27/2021 0322 by Savanna Jones RN  Outcome: Ongoing  6/27/2021 0320 by Savanna Jones RN  Outcome: Ongoing     Problem: Pain:  Goal: Control of acute pain  Description: Control of acute pain  6/27/2021 1102 by Harini Berger RN  Outcome: Ongoing     Problem: Pain:  Goal: Control of chronic pain  Description: Control of chronic pain  6/27/2021 1102 by Rakesh ESCALANTE JÚNIOR Tilley  Outcome: Ongoing     Problem: OXYGENATION/RESPIRATORY FUNCTION  Goal: Patient will maintain patent airway  6/27/2021 1102 by Sherin Escobar RN  Outcome: Ongoing  6/27/2021 0322 by Bonnie Weiner RN  Outcome: Ongoing     Problem: OXYGENATION/RESPIRATORY FUNCTION  Goal: Patient will achieve/maintain normal respiratory rate/effort  Description: Respiratory rate and effort will be within normal limits for the patient  6/27/2021 1102 by Sherin Escobar RN  Outcome: Ongoing     Problem: FLUID AND ELECTROLYTE IMBALANCE  Goal: Fluid and electrolyte balance are achieved/maintained  6/27/2021 1102 by Sherin Escobar RN  Outcome: Ongoing  6/27/2021 0322 by Bonnie Weiner RN  Outcome: Ongoing     Problem: ACTIVITY INTOLERANCE/IMPAIRED MOBILITY  Goal: Mobility/activity is maintained at optimum level for patient  6/27/2021 1102 by Sherin Escobar RN  Outcome: Ongoing  6/27/2021 0322 by Bonnie Weiner RN  Outcome: Ongoing     Problem: Serum Glucose Level - Abnormal:  Goal: Ability to maintain appropriate glucose levels has stabilized  Description: Ability to maintain appropriate glucose levels has stabilized  6/27/2021 1102 by Sherin Escobar RN  Outcome: Ongoing  6/27/2021 0322 by Bonnie Weiner RN  Outcome: Not Met This Shift

## 2021-06-27 NOTE — PROGRESS NOTES
Pt alert to person, place, and situation. Pt disoriented to time. Pt denies any pain, or SOB. Pt's scheduled meds are given as ordered. Pt denies other needs at present. Bed alarm on. Call light and item need in reach. Electronically signed by Arnie Mckeon RN on 6/27/2021 at 11:00 AM

## 2021-06-28 ENCOUNTER — APPOINTMENT (OUTPATIENT)
Dept: CT IMAGING | Age: 86
DRG: 177 | End: 2021-06-28
Payer: MEDICARE

## 2021-06-28 ENCOUNTER — APPOINTMENT (OUTPATIENT)
Dept: GENERAL RADIOLOGY | Age: 86
DRG: 177 | End: 2021-06-28
Payer: MEDICARE

## 2021-06-28 LAB
ALBUMIN SERPL-MCNC: 2.4 G/DL (ref 3.1–4.9)
ALBUMIN SERPL-MCNC: 2.7 G/DL (ref 3.4–5)
ALPHA-1-GLOBULIN: 0.2 G/DL (ref 0.2–0.4)
ALPHA-2-GLOBULIN: 0.9 G/DL (ref 0.4–1.1)
ANION GAP SERPL CALCULATED.3IONS-SCNC: 14 MMOL/L (ref 3–16)
BASOPHILS ABSOLUTE: 0.1 K/UL (ref 0–0.2)
BASOPHILS RELATIVE PERCENT: 1 %
BETA GLOBULIN: 1 G/DL (ref 0.9–1.6)
BUN BLDV-MCNC: 59 MG/DL (ref 7–20)
CALCIUM SERPL-MCNC: 8.3 MG/DL (ref 8.3–10.6)
CHLORIDE BLD-SCNC: 101 MMOL/L (ref 99–110)
CO2: 23 MMOL/L (ref 21–32)
CREAT SERPL-MCNC: 3.7 MG/DL (ref 0.8–1.3)
EOSINOPHILS ABSOLUTE: 0.2 K/UL (ref 0–0.6)
EOSINOPHILS RELATIVE PERCENT: 3.3 %
GAMMA GLOBULIN: 1.8 G/DL (ref 0.6–1.8)
GFR AFRICAN AMERICAN: 19
GFR NON-AFRICAN AMERICAN: 16
GLUCOSE BLD-MCNC: 119 MG/DL (ref 70–99)
GLUCOSE BLD-MCNC: 133 MG/DL (ref 70–99)
GLUCOSE BLD-MCNC: 136 MG/DL (ref 70–99)
GLUCOSE BLD-MCNC: 214 MG/DL (ref 70–99)
GLUCOSE BLD-MCNC: 230 MG/DL (ref 70–99)
GLUCOSE BLD-MCNC: 234 MG/DL (ref 70–99)
GLUCOSE BLD-MCNC: 309 MG/DL (ref 70–99)
GLUCOSE BLD-MCNC: 314 MG/DL (ref 70–99)
GLUCOSE BLD-MCNC: 85 MG/DL (ref 70–99)
GLUCOSE BLD-MCNC: 99 MG/DL (ref 70–99)
HCT VFR BLD CALC: 33.2 % (ref 40.5–52.5)
HEMOGLOBIN: 12 G/DL (ref 13.5–17.5)
L. PNEUMOPHILA SEROGP 1 UR AG: NORMAL
LYMPHOCYTES ABSOLUTE: 1.4 K/UL (ref 1–5.1)
LYMPHOCYTES RELATIVE PERCENT: 18.7 %
MAGNESIUM: 1.8 MG/DL (ref 1.8–2.4)
MCH RBC QN AUTO: 32.7 PG (ref 26–34)
MCHC RBC AUTO-ENTMCNC: 36 G/DL (ref 31–36)
MCV RBC AUTO: 90.7 FL (ref 80–100)
MONOCYTES ABSOLUTE: 0.7 K/UL (ref 0–1.3)
MONOCYTES RELATIVE PERCENT: 9.6 %
NEUTROPHILS ABSOLUTE: 5 K/UL (ref 1.7–7.7)
NEUTROPHILS RELATIVE PERCENT: 67.4 %
PDW BLD-RTO: 15.2 % (ref 12.4–15.4)
PERFORMED ON: ABNORMAL
PERFORMED ON: NORMAL
PERFORMED ON: NORMAL
PHOSPHORUS: 5.1 MG/DL (ref 2.5–4.9)
PLATELET # BLD: 199 K/UL (ref 135–450)
PMV BLD AUTO: 8.7 FL (ref 5–10.5)
POTASSIUM SERPL-SCNC: 4 MMOL/L (ref 3.5–5.1)
PRO-BNP: ABNORMAL PG/ML (ref 0–449)
PROCALCITONIN: 0.24 NG/ML (ref 0–0.15)
RBC # BLD: 3.66 M/UL (ref 4.2–5.9)
SODIUM BLD-SCNC: 138 MMOL/L (ref 136–145)
SPE/IFE INTERPRETATION: NORMAL
STREP PNEUMONIAE ANTIGEN, URINE: NORMAL
TOTAL PROTEIN: 6.3 G/DL (ref 6.4–8.2)
URINE ELECTROPHORESIS INTERP: NORMAL
WBC # BLD: 7.5 K/UL (ref 4–11)

## 2021-06-28 PROCEDURE — 74230 X-RAY XM SWLNG FUNCJ C+: CPT

## 2021-06-28 PROCEDURE — 80069 RENAL FUNCTION PANEL: CPT

## 2021-06-28 PROCEDURE — 6370000000 HC RX 637 (ALT 250 FOR IP): Performed by: INTERNAL MEDICINE

## 2021-06-28 PROCEDURE — 6360000002 HC RX W HCPCS: Performed by: INTERNAL MEDICINE

## 2021-06-28 PROCEDURE — 2580000003 HC RX 258: Performed by: INTERNAL MEDICINE

## 2021-06-28 PROCEDURE — 1200000000 HC SEMI PRIVATE

## 2021-06-28 PROCEDURE — 93005 ELECTROCARDIOGRAM TRACING: CPT | Performed by: INTERNAL MEDICINE

## 2021-06-28 PROCEDURE — 85025 COMPLETE CBC W/AUTO DIFF WBC: CPT

## 2021-06-28 PROCEDURE — 2580000003 HC RX 258: Performed by: NURSE PRACTITIONER

## 2021-06-28 PROCEDURE — 97116 GAIT TRAINING THERAPY: CPT

## 2021-06-28 PROCEDURE — 83735 ASSAY OF MAGNESIUM: CPT

## 2021-06-28 PROCEDURE — 84145 PROCALCITONIN (PCT): CPT

## 2021-06-28 PROCEDURE — 71250 CT THORAX DX C-: CPT

## 2021-06-28 PROCEDURE — 97530 THERAPEUTIC ACTIVITIES: CPT

## 2021-06-28 PROCEDURE — 83880 ASSAY OF NATRIURETIC PEPTIDE: CPT

## 2021-06-28 PROCEDURE — 97163 PT EVAL HIGH COMPLEX 45 MIN: CPT

## 2021-06-28 PROCEDURE — 2500000003 HC RX 250 WO HCPCS: Performed by: NURSE PRACTITIONER

## 2021-06-28 PROCEDURE — 6360000002 HC RX W HCPCS: Performed by: NURSE PRACTITIONER

## 2021-06-28 PROCEDURE — 2700000000 HC OXYGEN THERAPY PER DAY

## 2021-06-28 PROCEDURE — P9047 ALBUMIN (HUMAN), 25%, 50ML: HCPCS | Performed by: INTERNAL MEDICINE

## 2021-06-28 PROCEDURE — 36415 COLL VENOUS BLD VENIPUNCTURE: CPT

## 2021-06-28 PROCEDURE — 92610 EVALUATE SWALLOWING FUNCTION: CPT

## 2021-06-28 PROCEDURE — 92611 MOTION FLUOROSCOPY/SWALLOW: CPT

## 2021-06-28 PROCEDURE — 94760 N-INVAS EAR/PLS OXIMETRY 1: CPT

## 2021-06-28 PROCEDURE — 6370000000 HC RX 637 (ALT 250 FOR IP): Performed by: NURSE PRACTITIONER

## 2021-06-28 PROCEDURE — 92526 ORAL FUNCTION THERAPY: CPT

## 2021-06-28 RX ORDER — ALBUMIN (HUMAN) 12.5 G/50ML
25 SOLUTION INTRAVENOUS EVERY 8 HOURS
Status: COMPLETED | OUTPATIENT
Start: 2021-06-28 | End: 2021-06-29

## 2021-06-28 RX ORDER — LANOLIN ALCOHOL/MO/W.PET/CERES
6 CREAM (GRAM) TOPICAL NIGHTLY PRN
Status: DISCONTINUED | OUTPATIENT
Start: 2021-06-28 | End: 2021-07-01 | Stop reason: HOSPADM

## 2021-06-28 RX ADMIN — ALBUMIN (HUMAN) 25 G: 0.25 INJECTION, SOLUTION INTRAVENOUS at 16:00

## 2021-06-28 RX ADMIN — METRONIDAZOLE 500 MG: 500 INJECTION, SOLUTION INTRAVENOUS at 16:52

## 2021-06-28 RX ADMIN — ACETAMINOPHEN 650 MG: 325 TABLET ORAL at 22:37

## 2021-06-28 RX ADMIN — SODIUM CHLORIDE, PRESERVATIVE FREE 10 ML: 5 INJECTION INTRAVENOUS at 10:03

## 2021-06-28 RX ADMIN — SODIUM CHLORIDE, PRESERVATIVE FREE 10 ML: 5 INJECTION INTRAVENOUS at 22:41

## 2021-06-28 RX ADMIN — Medication 6 MG: at 22:37

## 2021-06-28 RX ADMIN — FUROSEMIDE 40 MG: 10 INJECTION, SOLUTION INTRAMUSCULAR; INTRAVENOUS at 09:54

## 2021-06-28 RX ADMIN — HYDRALAZINE HYDROCHLORIDE 50 MG: 50 TABLET, FILM COATED ORAL at 05:36

## 2021-06-28 RX ADMIN — LEVOTHYROXINE SODIUM 25 MCG: 0.03 TABLET ORAL at 07:05

## 2021-06-28 RX ADMIN — INSULIN LISPRO 8 UNITS: 100 INJECTION, SOLUTION INTRAVENOUS; SUBCUTANEOUS at 12:05

## 2021-06-28 RX ADMIN — ACETAMINOPHEN 650 MG: 325 TABLET ORAL at 16:52

## 2021-06-28 RX ADMIN — INSULIN LISPRO 2 UNITS: 100 INJECTION, SOLUTION INTRAVENOUS; SUBCUTANEOUS at 22:44

## 2021-06-28 RX ADMIN — CEFEPIME HYDROCHLORIDE 1000 MG: 1 INJECTION, POWDER, FOR SOLUTION INTRAMUSCULAR; INTRAVENOUS at 03:36

## 2021-06-28 RX ADMIN — METRONIDAZOLE 500 MG: 500 INJECTION, SOLUTION INTRAVENOUS at 02:30

## 2021-06-28 RX ADMIN — CLOPIDOGREL BISULFATE 75 MG: 75 TABLET ORAL at 09:54

## 2021-06-28 RX ADMIN — ATORVASTATIN CALCIUM 40 MG: 40 TABLET, FILM COATED ORAL at 09:54

## 2021-06-28 RX ADMIN — FUROSEMIDE 5 MG/HR: 10 INJECTION, SOLUTION INTRAMUSCULAR; INTRAVENOUS at 16:00

## 2021-06-28 RX ADMIN — HYDRALAZINE HYDROCHLORIDE 50 MG: 50 TABLET, FILM COATED ORAL at 22:37

## 2021-06-28 RX ADMIN — HYDRALAZINE HYDROCHLORIDE 50 MG: 50 TABLET, FILM COATED ORAL at 14:58

## 2021-06-28 RX ADMIN — METRONIDAZOLE 500 MG: 500 INJECTION, SOLUTION INTRAVENOUS at 09:55

## 2021-06-28 RX ADMIN — ENOXAPARIN SODIUM 30 MG: 30 INJECTION SUBCUTANEOUS at 09:55

## 2021-06-28 RX ADMIN — NIFEDIPINE 30 MG: 30 TABLET, FILM COATED, EXTENDED RELEASE ORAL at 09:54

## 2021-06-28 RX ADMIN — NADOLOL 20 MG: 20 TABLET ORAL at 09:54

## 2021-06-28 RX ADMIN — INSULIN LISPRO 4 UNITS: 100 INJECTION, SOLUTION INTRAVENOUS; SUBCUTANEOUS at 19:02

## 2021-06-28 ASSESSMENT — PAIN DESCRIPTION - FREQUENCY: FREQUENCY: CONTINUOUS

## 2021-06-28 ASSESSMENT — PAIN DESCRIPTION - PAIN TYPE: TYPE: ACUTE PAIN

## 2021-06-28 ASSESSMENT — PAIN - FUNCTIONAL ASSESSMENT: PAIN_FUNCTIONAL_ASSESSMENT: PREVENTS OR INTERFERES SOME ACTIVE ACTIVITIES AND ADLS

## 2021-06-28 ASSESSMENT — PAIN DESCRIPTION - LOCATION: LOCATION: BACK

## 2021-06-28 ASSESSMENT — PAIN DESCRIPTION - PROGRESSION: CLINICAL_PROGRESSION: NOT CHANGED

## 2021-06-28 ASSESSMENT — PAIN SCALES - GENERAL
PAINLEVEL_OUTOF10: 0
PAINLEVEL_OUTOF10: 0
PAINLEVEL_OUTOF10: 6
PAINLEVEL_OUTOF10: 0

## 2021-06-28 ASSESSMENT — PAIN DESCRIPTION - DESCRIPTORS: DESCRIPTORS: ACHING;SORE

## 2021-06-28 ASSESSMENT — PAIN DESCRIPTION - ONSET: ONSET: ON-GOING

## 2021-06-28 ASSESSMENT — PAIN DESCRIPTION - ORIENTATION: ORIENTATION: MID;LOWER

## 2021-06-28 NOTE — PROGRESS NOTES
Speech Language Pathology  Facility/Department: [address] MED SURG   CLINICAL BEDSIDE SWALLOW EVALUATION    NAME: Venancio Miles  : 1935  MRN: [id number]    ADMISSION DATE: 2021  ADMITTING DIAGNOSIS: Fluid overload [E87.70]     Venancio Miles has Mixed hyperlipidemia; Essential hypertension, benign; Type 2 diabetes mellitus with renal manifestations ([address] Prostate cancer ([address] Osteoarthritis of left knee; Ulcer of foot ([address] Carotid artery stenosis; Atherosclerosis of leg with intermittent claudication ([address] Gangrene of toe ([address] Atherosclerosis of native arteries of the extremities with ulceration ([address] Osteomyelitis of foot, left, acute ([address] Callus; Atherosclerosis of native artery of both lower extremities with intermittent claudication ([address] Acquired hypothyroidism; Leg swelling; and Fluid overload on their problem list.    ONSET DATE: 2021    CHART REVIEW:  2021 admitted with c/o leg swelling: ADMISSION H&P HPI:  32-year-old male with past medical history of hypertension, diabetes mellitus, hypothyroidism, history of peripheral arterial disease with right toe amputation, left TMA presented to the hospital with complains of bilateral leg swelling and shortness of breath. Patient does not speak much and this responds with yes and no most of the time. As per his daughter he has been getting more short of breath and worsening bilateral lower extremity edema. Due to worsening symptoms he was brought to the hospital.  On arrival he was noted to be hypertensive blood pressure 200/100 chest x-ray was performed which showed findings concerning for multifocal airspace disease. proBNP was noted to be elevated at 33,000. Creatinine was 3.1. Patient was admitted to the hospital for further work-up and management. 2021 CXR  Impression   Subtle patchy opacities noted at the right lung base and left upper lung   which may reflect multifocal airspace disease.      2021 late PM RAPID RESPONSE: Patient was found to be hypoglycemic with blood sugars in the 20s. Patient was given oral glucose followed by half amp of dextrose. After 20-30 minutes he did have improvement in his level of arousability. Prandial insulin will be held at this time, will also hold Lantus this evening. Patient was started on D10 at 25 an hour as he is admitted with volume overload. Patient is now opening eyes and looking around the room. Son reports patient is not very verbal.  He is spontaneously opening his eyes without stimulation. Will repeat Accu-Chek in 1 hour. Chest x-ray was obtained as there is concern for aspiration    6/26/2021 CXR  Impression   Right basilar atelectasis, aspiration or pneumonia. Date of Eval: 6/28/2021  Evaluating Therapist: JÚNIOR Ross    Current Diet level:  Current Diet : Regular  Current Liquid Diet : Thin      Primary Complaint  Patient Complaint: patient denies dysphagia; RN reports no concern for dysphagia    Pain:  Pain Level: 0    Reason for Referral  Malika Mcdaniels was referred for a bedside swallow evaluation to assess the efficiency of his swallow function, identify signs and symptoms of aspiration and make recommendations regarding safe dietary consistencies, effective compensatory strategies, and safe eating environment. Impression  Dysphagia Diagnosis: Mild oral stage dysphagia;Mild pharyngeal stage dysphagia; Suspected needs further assessment  · Accepted and tolerated evaluation at bedside. · Patient alert, cooperative, pleasant; apparent confusion - oriented to self only, limited ability to provide history; follows simple dx with cueing/assist at times; noted with lability. · Clinically appears to present with mild oropharyngeal dysphagia characterized by decreased mastication, decreased lingual manipulation, delayed swallow, and decreased laryngeal elevation. Bolus formation appears prolonged but adequate with regular and soft solids.  Prolonged transit with all. Suspect premature bolus loss to th pharynx with all. Mild oral residue with textured solids appears to be cleared with liquid wash. Immediate throat clear noted x1 with honey thick trial and x1 with nectar thick trial; unable to reduplicate when presented with additional honey and nectar trials and no throat clear noted with thin liquid. Family reports throat clearing prior to admission. · Recommend MBS to confirm current suspected severity of dysphagia and to further assess diet modification and or skilled ST needs. Dysphagia Outcome Severity Scale: Level 5: Mild dysphagia- Distant supervision. May need one diet consistency restricted     Treatment Plan  Requires SLP Intervention: Yes  Duration/Frequency of Treatment: ST to follow-up 1-3 times during acute admission unless othrwise noted  D/C Recommendations: To be determined    Recommended Diet and Intervention  Diet Solids Recommendation: Regular  Liquid Consistency Recommendation: Thin  Recommended Form of Meds: Meds in puree     Therapeutic Interventions: Diet tolerance monitoring;Patient/Family education    Compensatory Swallowing Strategies  Compensatory Swallowing Strategies: Upright as possible for all oral intake;Remain upright for 30-45 minutes after meals; Alternate solids and liquids;Small bites/sips    Treatment/Goals  Dysphagia Goals: The patient will tolerate instrumental swallowing procedure; The patient will tolerate recommended diet without observed clinical signs of aspiration; The patient/caregiver will demonstrate understanding of compensatory strategies for improved swallowing safety. General  Chart Reviewed: Yes  Behavior/Cognition: Alert;Pleasant mood; Cooperative;Confused  Communication Observation: Functional  Follows Directions: Simple  Dentition: Adequate  Patient Positioning: Upright in bed  Baseline Vocal Quality: Normal  Volitional Cough: Weak  Volitional Swallow: Delayed  Prior Dysphagia History: patient/family report no concern for difficulty swallowing prior to admission; throat clearing with and without PO reported for \"years\"  Consistencies Administered: Dysphagia Pureed (Dysphagia I); Honey - cup;Nectar - cup; Thin - cup; Thin - straw;Dysphagia Soft and Bite-Sized (Dysphagia III); Reg solid    Vision/Hearing  Vision:  (adequate for assessment needs)  Hearing:  (adequate for assessment needs)  Hearing Exceptions: Hard of hearing/hearing concerns    Oral Motor Deficits  Labial Strength: Reduced  Lingual Strength: Reduced  Mandible: Impaired    Oral Phase Dysfunction  Impaired Mastication: Soft Solid; Reg Solid (prolonged - appears adequate)  Decreased Anterior to Posterior Transit: All  Suspected Premature Bolus Loss: All  Lingual/Palatal Residue: Reg solid; Soft solid (clears with liquid wash)     Indicators of Pharyngeal Phase Dysfunction  Delayed Swallow: All  Decreased Laryngeal Elevation: All  Throat Clearing - Immediate: Honey - cup;Nectar - cup (noted 1/4 trials with honey and 1/4 trials with nectar; no throat clear with thin)  Pharyngeal: occasional throat clear with liquid trials (only noted with thick liquids, though) reported as habitual for years per family; recommend MBS for continued assessment    Prognosis  Prognosis for safe diet advancement: good  Consulted and agree with results and recommendations: Patient;RN;Family member;MD    Education  Patient Education: Completed on results/recs/plan  Patient Education Response: Needs reinforcement         Therapy Time  SLP Individual Minutes  Time In: 1045  Time Out: [phone]  Minutes: [address] [place] [address].  [facility] Poll[place], [id number] [place] [address], [id number]  Speech-Language Pathologist  Portable phone: [phone]  6/28/2021 11:30 AM

## 2021-06-28 NOTE — PROGRESS NOTES
Patient alert and oriented x4, VSS, sitting up in bed, son at bedside. Patient denies n/v, diarrhea, SOB, pain. Patient denies needs at this time. Bed in lowest position, bed alarm on, call light within reach. Will continue to monitor pt needs.

## 2021-06-28 NOTE — CARE COORDINATION
INITIAL CASE MANAGEMENT ASSESSMENT    Reviewed chart, met with patient & son-in -law to assess possible discharge needs. Explained Case Management role/services. Living Situation: address address, lives with sife in a 1 story house with 2 [address] & a ramp, daughter Lashon Griffin) & son-in-law live just a quarter of a mile down the road & assist    ADLs: needs assistance from family with some dressing & occasional bathing, he & wife do breakfast on their own but daughter & son-in-law fix them lunch & dinner, son-in-law checks sugars & administers insulin     DME: w/c, walker, glucomter, requesting a hospital bed    PT/OT Recs: ordered     Active Services: none at this time but interested & list provided     Transportation: family will transport home     Medications: no barriers, fills scripts at [facility] in [place]    PCP: Dorene Malave MD      HD/PD: na    PLAN/COMMENTS: plan is to return home, family interested in [facility] (list provided) & requesting a script for a hospital bed (PT/OT ordered)    CM provided contact information for patient or family to call with any questions. CM will follow and assist as needed.     Bernardo Iqbal RN, BSN,   [phone]    Electronically signed by Bernardo Iqbal RN on 6/28/2021 at 10:51 AM

## 2021-06-28 NOTE — CARE COORDINATION
The Plan for Transition of Care is related to the following treatment goals: increase strength at home    The Patient and patient representative (son-in-law) was provided with a choice of provider and agrees with the discharge plan. [x] Yes [] No    Freedom of choice list was provided with basic dialogue that supports the patient's individualized plan of care/goals, treatment preferences and shares the quality data associated with the providers. [x] Yes [] No    List left at bs, will [place] back around when daughter, Yue Lewis, get here. Electronically signed by Alpesh Lala RN on 6/28/21 at 10:44 AM EDT    Family chose [facility] in [facility], referral sent.     Electronically signed by Alpesh Lala RN on 6/28/21 at 11:56 AM EDT

## 2021-06-28 NOTE — PLAN OF CARE
Problem: Falls - Risk of:  Goal: Will remain free from falls  Description: Will remain free from falls  Outcome: Ongoing  Goal: Absence of physical injury  Description: Absence of physical injury  Outcome: Ongoing     Problem: Skin Integrity:  Goal: Will show no infection signs and symptoms  Description: Will show no infection signs and symptoms  Outcome: Ongoing  Goal: Absence of new skin breakdown  Description: Absence of new skin breakdown  Outcome: Ongoing     Problem: Pain:  Description: Pain management should include both nonpharmacologic and pharmacologic interventions.   Goal: Pain level will decrease  Description: Pain level will decrease  Outcome: Ongoing  Goal: Control of acute pain  Description: Control of acute pain  Outcome: Ongoing  Goal: Control of chronic pain  Description: Control of chronic pain  Outcome: Ongoing     Problem: OXYGENATION/RESPIRATORY FUNCTION  Goal: Patient will maintain patent airway  Outcome: Ongoing  Goal: Patient will achieve/maintain normal respiratory rate/effort  Description: Respiratory rate and effort will be within normal limits for the patient  Outcome: Ongoing     Problem: HEMODYNAMIC STATUS  Goal: Patient has stable vital signs and fluid balance  Outcome: Ongoing     Problem: FLUID AND ELECTROLYTE IMBALANCE  Goal: Fluid and electrolyte balance are achieved/maintained  Outcome: Ongoing     Problem: ACTIVITY INTOLERANCE/IMPAIRED MOBILITY  Goal: Mobility/activity is maintained at optimum level for patient  Outcome: Ongoing     Problem: Serum Glucose Level - Abnormal:  Goal: Ability to maintain appropriate glucose levels has stabilized  Description: Ability to maintain appropriate glucose levels has stabilized  Outcome: Ongoing

## 2021-06-28 NOTE — PROGRESS NOTES
Physical Therapy    Facility/Department: [address] MED SURG  Initial Assessment/Treatment Session    NAME: Danna Triplett  : 1935  MRN: [id number]    Date of Service: 2021    Discharge Recommendations:  Patient would benefit from continued therapy after discharge, 24 hour supervision or assist, Home with Home health PT, S Level 3, 3-5 sessions per week   PT Equipment Recommendations  Equipment Needed: No    Assessment   Body structures, Functions, Activity limitations: Decreased functional mobility ; Decreased endurance;Decreased safe awareness;Decreased balance;Decreased ADL status; Decreased strength  Assessment: Pt is an 81 y.o. M. admitted  for CHF, respiratory failure, DM. He is confused at baseline, and family has been providing assist at home for self-care/mobility. Today he required Mod-Max A to ambulate short distances in room, experiencing progressive posterior lean, and knee buckling. He would benefit from continued therapy to improve his balance, strength, safety awareness, and independence transferring and ambulating. Family is intent upon bringing pt home, and have been providing 24/7 assist.  If pt return home, he will need to function at the wc level (family has a wc), and will require physical lifting assist for transfers and any ambulation. Also recommend home PT level 1. If family cannot provide 24/7 assist, recommend low-moderate frequency therapy upon D/C. Danna Triplett scored a  on the AM-PAC short mobility form. Current research shows that an AM-PAC score of 17 or less is typically not associated with a discharge to the patient's home setting. Based on the patient's AM-PAC score and their current functional mobility deficits, it is recommended that the patient have 3-5 sessions per week of Physical Therapy at d/c to increase the patient's independence. Please see assessment section for further patient specific details.     If patient discharges prior to next session this note will serve as a discharge summary. Please see below for the latest assessment towards goals. Specific instructions for Next Treatment: Improve strength, balance, ambulation  Prognosis: Fair  Decision Making: High Complexity  History: See below  Exam: Strength; ROM; Balance; Ambulation  Clinical Presentation: Unstable  PT Education: Goals; General Safety;Gait Training;PT Role;Orientation;Plan of Care;Precautions; Functional Mobility Training; Injury Prevention;Transfer Training  Barriers to Learning: Fatigue; Confusion  REQUIRES PT FOLLOW UP: Yes  Activity Tolerance  Activity Tolerance: Patient limited by fatigue;Patient Tolerated treatment well       Patient Diagnosis(es): The primary encounter diagnosis was Peripheral edema. Diagnoses of Essential hypertension and Acute kidney injury superimposed on chronic kidney disease ([facility] [address] were also pertinent to this visit. has a past medical history of Arthritis, Circulation problem, Diabetes mellitus ([address] Gangrene of toe ([facility] [address] Hyperlipidemia, Hypertension, Toe amputation status, and Wears glasses. has a past surgical history that includes hernia repair; Toe amputation (4/4/2013); Foot Amputation (9/16/13 ); skin split graft (9/16/13); Toe amputation (9/16/13); pr amputation toe,mt-p jt (Right 2nd and 3rd toe); Cardiac catheterization; Colonoscopy (9/20/11); and Colonoscopy (04/20/2017). Restrictions  Restrictions/Precautions  Restrictions/Precautions: Fall Risk     Vision/Hearing  Vision: Within Functional Limits  Hearing Exceptions: Hard of hearing/hearing concerns       Subjective  General  Chart Reviewed: Yes  Additional Pertinent Hx: Per Dr. Prince Daly, \"81year-old male with past medical history of hypertension, diabetes mellitus, hypothyroidism, history of peripheral arterial disease with right toe amputation, left TMA presented to the hospital with complains of bilateral leg swelling and shortness of breath. Noted to be in fluid overload.   6/27: Had hypoglycemia episode overnight. Per family, he rarely takes more than 5U prandial insulin even though he is prescribed 10U. Was placed on D10 and blood glucose improved overnight. Also had worsening hypoxia and CXR showed atelectasis vs pneumonia and he was started on cefepime and Flagyl. He feels better today than yesterday. Weaned down to 1L. \"  Response To Previous Treatment: Not applicable  Referring Practitioner: Dr. Alissa Henderson  Referral Date : 06/28/21  Diagnosis: CHF; Respiratory failure; DM; PAD  Follows Commands: Impaired  Subjective  Subjective: Pt pleasantly confused. Family present - able to assist with social hx. Pain Screening  Patient Currently in Pain: No    Orientation  Orientation  Overall Orientation Status: Impaired  Orientation Level: Disoriented to time;Oriented to place; Disoriented to situation;Oriented to person     Social/Functional History  Social/Functional History  Lives With: Spouse  Type of Home: House  Home Layout: One level  Home Access: Ramped entrance  Bathroom Shower/Tub: Walk-in shower  Bathroom Toilet: Standard  Bathroom Equipment: Shower chair, Grab bars around toilet  Home Equipment: Rolling walker, [employer]  ADL Assistance:  (Wife and family assist with bathing, dressing, and self-care)  Homemaking Assistance:  (Family assists)  Ambulation Assistance: Needs assistance (Pt able to ambulate in home with RW, supervision. Uses wc for community mobility)  Transfer Assistance: Needs assistance (Variable level of assistance (could be Min-Max depending on energy level/mood). )  Active : No  Additional Comments: No hx of recent falls. Family assisted in providing social hx. Objective    AROM RLE (degrees)  RLE General AROM: Hip Flex, and Knee Ext moderately limited. Minimal ankle motion. AROM LLE (degrees)  LLE General AROM: HIp Flex, knee Ext moderately limited.   Ankle PF/DF WFL  AROM RUE (degrees)  RUE AROM : WFL  RUE General AROM: Shoulder Flex ~ 90, Elbow Flex/Ext WFL  AROM LUE (degrees)  LUE AROM : WFL  LUE General AROM: Shoulder Flex ~ 90, Elbow Flex/Ext WFL    Strength RLE  Strength RLE: Exception  Comment: Hip Flex 2/5, Knee Ext 3/5, Ankle PF/DF 1/5  Strength LLE  Strength LLE: Exception  Comment: Hip Flex 2/5, Knee Ext 3/5, Ankle PF/DF 3/5  Strength RUE  Strength RUE: WFL  Comment: Shoulder Flex 3-/5, Elbow Flex/Ext WFL  Strength LUE  Strength LUE: WFL  Comment: Shoulder Flex 3-/5, Elbow Flex/Ext WFL     Tone RLE  RLE Tone: Normotonic  Tone LLE  LLE Tone: Normotonic  Motor Control  Gross Motor?: WFL     Bed mobility  Supine to Sit: Maximum assistance     Transfers  Sit to Stand: Moderate Assistance;Maximum Assistance (From EOB to walker; From commode to walker)  Stand to sit: Moderate Assistance     Ambulation  Ambulation?: Yes  Ambulation 1  Surface: level tile  Device: Rolling Walker  Assistance: Moderate assistance;Maximum assistance  Quality of Gait: Pt limited by fatigue, taking short steps, experiencing progressive posterolateral lean, Mod-max A to maintain upright standing. Distance: 10' x 2  Stairs/Curb  Stairs?: No      Plan   Plan  Times per week: 2-3x  Specific instructions for Next Treatment: Improve strength, balance, ambulation  Current Treatment Recommendations: Strengthening, Balance Training, Endurance Training, Functional Mobility Training, Transfer Training, Gait Training, Stair training, Positioning, Equipment Evaluation, Education, & procurement, Patient/Caregiver Education & Training, Home Exercise Program, Neuromuscular Re-education, Safety Education & Training, Wheelchair Mobility Training  Safety Devices  Type of devices:  All fall risk precautions in place, Call light within reach, Chair alarm in place, Gait belt, Left in chair, Nurse notified  Restraints  Initially in place: No    AM-PAC Score  AM-PAC Inpatient Mobility Raw Score : 11 (06/28/21 1247)  AM-PAC Inpatient T-Scale Score : 33.86 (06/28/21 1247)  Mobility Inpatient CMS 0-100% Score: 72.57 (06/28/21 1247)  Mobility Inpatient CMS G-Code Modifier : CL (06/28/21 1247)    Goals  Short term goals  Time Frame for Short term goals: In 2-3 days pt will perform  Short term goal 1: Bed mobility Min A  Short term goal 2: Transfers Min A  Short term goal 3: Ambulation 22' with RW, Min A  Long term goals  Time Frame for Long term goals : LTG = STG  Patient Goals   Patient goals : Family is hopeful pt can return home       Therapy Time   Individual Concurrent Group Co-treatment   Time In 1125         Time Out 1205         Minutes 40         Timed Code Treatment Minutes: 25 Minutes   Evaluation time: 15 min.      Mari Schmitt PT    Electronically signed by Mari Schmitt, GABINO [id number] on 6/28/2021 at 12:56 PM

## 2021-06-28 NOTE — PROCEDURES
and movement with puree and textured solids. Premature bolus loss to the pharynx with nectar and thin. Mild pharyngeal stage dysphagia characterized by delayed swallow, decreased laryngeal elevation, and decreased pharyngeal peristalsis. Premature spillage to the valleculae with nectar nadh thin and to the pyriform with thin. NO penetration/aspiration or significant residue post-swallow. Dysphagia Outcome Severity Scale: Level 5: Mild dysphagia- Distant supervision. May need one diet consistency restricted  Penetration-Aspiration Scale (PAS): 1 - Material does not enter the airway    Recommended Diet:  Solid consistency: Regular  Liquid consistency: Thin  Liquid administration via: Cup;Straw (patient prefers straw d/t labial spillage iwth cup)  Medication administration: Meds in puree  Compensatory Swallowing Strategies: Upright as possible for all oral intake;Remain upright for 30-45 minutes after meals    Recommendations/Treatment  Requires SLP Intervention: Yes  Duration/Frequency of Treatment: ST to follow-up 1-3 times during acute admission unless otherwise noted  Therapeutic Interventions: Diet tolerance monitoring;Patient/Family education  D/C Recommendations:  (suspect no skilled ST need for dysphagia upon discharge)    Education:   Patient Education: Completed on results/recs/plan  Patient Education Response: Needs reinforcement    Prognosis  Prognosis for safe diet advancement: good      Goals:    Dysphagia Goals: The patient will tolerate recommended diet without observed clinical signs of aspiration; The patient/caregiver will demonstrate understanding of compensatory strategies for improved swallowing safety. Oral Preparation / Oral Phase  Oral Phase - Major Contributing Deficits  Poor Mastication: Mechanical soft solid; Soft solid;Reg solid (comparable effort/labor across textures)  Weak Lingual Manipulation: Puree;Mechanical soft solid; Soft solid;Reg solid  Lingual Pumping: Puree;Mechanical soft solid; Soft solid;Reg solid  Reduced Posterior Propulsion: All  Premature Bolus Loss to Pharynx: Nectar straw; Thin straw    Pharyngeal Phase  Pharyngeal Phase - Major Contributing Deficits  Delayed Swallow Initiation: All  Premature Spillage to Valleculae: Nectar straw; Thin straw  Premature Spillage to Pyriform: Thin straw  Reduced Pharyngeal Peristalsis: All  Reduced Laryngeal Elevation: All    Upper Esophageal Phase  Esophageal Screen:  (ASHWINI)      Pain   Patient Currently in Pain: No      Therapy Time:   Individual   Time In 1345   Time Out 1430   Minutes 45       Ulises PetitBrot[facility], [id number]  Speech-Language Pathologist  Portable phone: [phone]  6/28/2021, 2:30 PM

## 2021-06-28 NOTE — PROGRESS NOTES
Physician Progress Note      PATIENTDimitris Hidalgo  CSN #:                  [id number]  :                       1935  ADMIT DATE:       2021 4:21 PM  [address] [place] DATE:  RESPONDING  PROVIDER #:        Shaquille Vilchis MD          QUERY TEXT:    Pt admitted with MELVIN and acute diastolic CHF. Had episode of hypoglycemia   with glucose 22 and decreased mental status with acute respiratory failure   with concern for aspiration. CXR showed: Right basilar atelectasis,   aspiration or pneumonia. Pneumonia documented and started on Flagyl and   Maxipime. If possible, please document in the progress notes and discharge   summary if you are evaluating and/or treating any of the following:    Note: CAP and HCAP indicate where the pneumonia was acquired, not a specific   type. The medical record reflects the following:  Risk Factors: hypoglycemia with concern for aspiration  Clinical Indicators: glucose down to 22, decreased mental status, development   of acute respiratory failure, CXR shows possible aspiration  Treatment: increased supplemental O2, Maxipime, Flagyl    Thank you,  Althea Bernal RN, BSN, CCDS  [email or web address]  Options provided:  -- Aspiration pneumonia  -- Other - I will add my own diagnosis  -- Disagree - Not applicable / Not valid  -- Disagree - Clinically unable to determine / Unknown  -- Refer to Clinical Documentation Reviewer    PROVIDER RESPONSE TEXT:    This patient has aspiration pneumonia.     Query created by: Criselda Maloney on 2021 7:26 AM      Electronically signed by:  Shaquille Vilchis MD 2021 4:41 PM

## 2021-06-28 NOTE — PROGRESS NOTES
Hospitalist Progress Note      PCP: Tegan Biggs MD    Date of Admission: 6/25/2021    Chief Complaint: Bilateral lower extremity edema, shortness of breath    Hospital Course: 81-year-old male with past medical history of hypertension, diabetes mellitus, hypothyroidism, history of peripheral arterial disease with right toe amputation, left TMA presented to the hospital with complains of bilateral leg swelling and shortness of breath. Noted to be in fluid overload    6/27: Had hypoglycemia episode overnight. Per family, he rarely takes more than 5U prandial insulin even though he is prescribed 10U. Was placed on D10 and blood glucose improved overnight. Also had worsening hypoxia and CXR showed atelectasis vs pneumonia and he was started on cefepime and Flagyl. He feels better today than yesterday. Weaned down to 1L. Subjective: Pt seen and examined. Feels ok. Oxygen down to 0.5L. Swelling improved.     Medications:  Reviewed    Infusion Medications    sodium chloride      dextrose       Scheduled Medications    metroNIDAZOLE  500 mg Intravenous Q8H    cefepime  1,000 mg Intravenous Q24H    NIFEdipine  30 mg Oral Daily    hydrALAZINE  50 mg Oral 3 times per day    nadolol  20 mg Oral Daily    atorvastatin  40 mg Oral Daily    clopidogrel  75 mg Oral Daily    levothyroxine  25 mcg Oral QAM AC    sodium chloride flush  5-40 mL Intravenous 2 times per day    furosemide  40 mg Intravenous BID    enoxaparin  30 mg Subcutaneous Daily    insulin lispro  0-12 Units Subcutaneous TID WC    insulin lispro  0-6 Units Subcutaneous Nightly     PRN Meds: docusate sodium, sodium chloride flush, sodium chloride, ondansetron **OR** ondansetron, polyethylene glycol, acetaminophen **OR** acetaminophen, perflutren lipid microspheres, hydrALAZINE, glucose, dextrose, glucagon (rDNA), dextrose      Intake/Output Summary (Last 24 hours) at 6/28/2021 1044  Last data filed at 6/28/2021 0834  Gross per 24 hour   Intake 800 ml   Output 775 ml   Net 25 ml       Physical Exam Performed:    BP (!) 150/69   Pulse 70   Temp 98.1 °F (36.7 °C) (Oral)   Resp 18   Ht 5' 4\" (1.626 m)   Wt 160 lb 4.4 oz (72.7 kg)   SpO2 93%   BMI 27.51 kg/m²     General appearance: No apparent distress, appears stated age and cooperative. HEENT: Pupils equal, round, and reactive to light. Conjunctivae/corneas clear. Neck: Supple, with full range of motion. No jugular venous distention. Trachea midline. Respiratory:  Normal respiratory effort. Bibasilar crackles. Cardiovascular: Regular rate and rhythm with normal S1/S2 without murmurs, rubs or gallops. Abdomen: Soft, non-tender, non-distended with normal bowel sounds. Musculoskeletal: 1+ bilateral lower extremity edema improving. Amputations noted right and left feet. Skin: Skin color, texture, turgor normal.  No rashes or lesions. Neurologic:  Neurovascularly intact without any focal sensory/motor deficits. Cranial nerves: II-XII intact, grossly non-focal.  Psychiatric: Alert and oriented, thought content appropriate, normal insight  Capillary Refill: Brisk,3 seconds, normal   Peripheral Pulses: +2 palpable, equal bilaterally       Labs:   Recent Labs     06/25/21  1537 06/26/21  2312 06/27/21  0810   WBC 8.1  --  11.5*   HGB 13.2* 13.2* 12.7*   HCT 39.5*  --  38.2*     --  222     Recent Labs     06/26/21  0638 06/27/21  0810 06/28/21  0721     136 141 138   K 4.2  4.1 4.1 4.0     103 103 101   CO2 18*  21 23 23   BUN 55*  51* 55* 59*   CREATININE 3.3*  3.2* 3.4* 3.7*   CALCIUM 8.8  8.7 8.9 8.3   PHOS 4.4  --  5.1*     Recent Labs     06/25/21  1537   AST 11*   ALT 8*   BILITOT <0.2   ALKPHOS 113     No results for input(s): INR in the last 72 hours.   Recent Labs     06/25/21  1537   TROPONINI 0.05*       Urinalysis:      Lab Results   Component Value Date    NITRU Negative 06/26/2021    WBCUA 0-2 06/26/2021    BACTERIA Rare 08/05/2011    RBCUA 0-2 06/26/2021 BLOODU Negative 06/26/2021    SPECGRAV 1.010 06/26/2021    GLUCOSEU 100 06/26/2021    GLUCOSEU Negative 12/20/2011       Radiology:  XR CHEST PORTABLE   Final Result   Right basilar atelectasis, aspiration or pneumonia. XR CHEST PORTABLE   Final Result   Subtle patchy opacities noted at the right lung base and left upper lung   which may reflect multifocal airspace disease. Assessment/Plan:    Active Hospital Problems    Diagnosis     Fluid overload [E87.70]         Bilateral lower extremity swelling with concern for fluid overload  Acute diastolic heart failure  -Continue Lasix 40 mg IV twice daily  -Echo with EF 19-01%, grade 1 diastolic dysfunction  -Nephrology following  -Strict input and output    Acute respiratory failure with hypoxia - likely 2/2 fluid overload above  -continue diuresis as above  -wean oxygen to maintain SpO2 > 89%  -check procalcitonin  -Strep pneumo, Legionella urine antigen  -respiratory culture  -on empiric cefepime/Flagyl, will discontinue if workup is negative  -check CT Chest without contrast today     Hypertension  -Continue amlodipine, nadolol, hydralazine  -increase Norvasc dose     CKD stage IV  -Continue monitor creatinine, slightly worsening     Diabetes mellitus, with hypoglycemic episode overnight  -stop D10  -hold scheduled Humalog and Lantus  -continue SSI  -hypoglycemia protocol  -POCT glucose checks  -carb control diet     Peripheral arterial disease  -Status post right second and third toe amputation, left TMA  -Continue Plavix, Lipitor     Hypothyroidism  -Continue Synthroid    DVT Prophylaxis: Lovenox  Diet: ADULT DIET; Regular; 3 carb choices (45 gm/meal);  Low Sodium (2 gm); 1500 ml  Code Status: Full Code    Dispo -continue IV diuresis, continue to monitor creatinine    Live Molina MD

## 2021-06-28 NOTE — PROGRESS NOTES
Kimberly Perez requires the assistance of a Semi-Electric Hospital Bed to make frequent and immediate changes of body positions not feasible with an ordinary bed to alleviate pain. Patient needs bed height requirements to perform patient transfers, grooming, and daily living tasks. The patient requires the head of the bed to be elevated more than 30 degrees most of the time, due to patient's diagnosis. Pillows and wedges have been considered and ruled out.     Electronically signed by Mercy Schulte MD on 6/28/2021 at 11:34 AM

## 2021-06-28 NOTE — PROGRESS NOTES
Department of Internal Medicine  Nephrology Progress Note      HPI: Mr Margarita Wylie is an 82 yo male with a past medical history significant for DM II, hypertension, peripheral vascular disease, chronic kidney disease stage  IV, follows up with Dr. Mallorie Cuevas (baseline creatinine in 2.9-3.2 mg/dl range recently), brought to ER by the family because of uncontrolled diabetes and blood pressure with worsening lower extremity edema and some shortness of breath and dyspnea on exertion. At the time  of consultation, the patient was found to be in MELVIN    SUBJECTIVE:  Resting in bed; breathing and edema according to family members has improved     Family at bedside     Physical Exam:    VITALS:  BP (!) 150/69   Pulse 70   Temp 98.1 °F (36.7 °C) (Oral)   Resp 18   Ht 5' 4\" (1.626 m)   Wt 160 lb 4.4 oz (72.7 kg)   SpO2 93%   BMI 27.51 kg/m²   24HR INTAKE/OUTPUT:      Intake/Output Summary (Last 24 hours) at 6/28/2021 1240  Last data filed at 6/28/2021 0932  Gross per 24 hour   Intake 680 ml   Output 1200 ml   Net -520 ml       Constitutional:  Alert,awake  Respiratory:  Decrease BS at bases / rhonchi   Gastrointestinal:  No  tenderness.   Normal Bowel Sounds  Cardiovascular:  RRR   Edema:  ++  edema     metroNIDAZOLE  500 mg Intravenous Q8H    cefepime  1,000 mg Intravenous Q24H    NIFEdipine  30 mg Oral Daily    hydrALAZINE  50 mg Oral 3 times per day    nadolol  20 mg Oral Daily    atorvastatin  40 mg Oral Daily    clopidogrel  75 mg Oral Daily    levothyroxine  25 mcg Oral QAM AC    sodium chloride flush  5-40 mL Intravenous 2 times per day    furosemide  40 mg Intravenous BID    enoxaparin  30 mg Subcutaneous Daily    insulin lispro  0-12 Units Subcutaneous TID WC    insulin lispro  0-6 Units Subcutaneous Nightly       DATA:    CBC:  Lab Results   Component Value Date    WBC 7.5 06/28/2021    RBC 3.66 06/28/2021    HGB 12.0 06/28/2021    HCT 33.2 06/28/2021    MCV 90.7 06/28/2021    MCH 32.7 06/28/2021    MCHC 36.0

## 2021-06-29 LAB
ALBUMIN SERPL-MCNC: 3.5 G/DL (ref 3.4–5)
ANION GAP SERPL CALCULATED.3IONS-SCNC: 19 MMOL/L (ref 3–16)
BASOPHILS ABSOLUTE: 0.1 K/UL (ref 0–0.2)
BASOPHILS RELATIVE PERCENT: 1.1 %
BUN BLDV-MCNC: 64 MG/DL (ref 7–20)
CALCIUM SERPL-MCNC: 8.5 MG/DL (ref 8.3–10.6)
CHLORIDE BLD-SCNC: 100 MMOL/L (ref 99–110)
CO2: 19 MMOL/L (ref 21–32)
CREAT SERPL-MCNC: 3.9 MG/DL (ref 0.8–1.3)
EKG ATRIAL RATE: 69 BPM
EKG DIAGNOSIS: NORMAL
EKG P AXIS: 80 DEGREES
EKG P-R INTERVAL: 280 MS
EKG Q-T INTERVAL: 414 MS
EKG QRS DURATION: 90 MS
EKG QTC CALCULATION (BAZETT): 443 MS
EKG R AXIS: 56 DEGREES
EKG T AXIS: 213 DEGREES
EKG VENTRICULAR RATE: 69 BPM
EOSINOPHILS ABSOLUTE: 0.4 K/UL (ref 0–0.6)
EOSINOPHILS RELATIVE PERCENT: 5.5 %
GFR AFRICAN AMERICAN: 18
GFR NON-AFRICAN AMERICAN: 15
GLUCOSE BLD-MCNC: 136 MG/DL (ref 70–99)
GLUCOSE BLD-MCNC: 144 MG/DL (ref 70–99)
GLUCOSE BLD-MCNC: 225 MG/DL (ref 70–99)
GLUCOSE BLD-MCNC: 231 MG/DL (ref 70–99)
GLUCOSE BLD-MCNC: 244 MG/DL (ref 70–99)
HCT VFR BLD CALC: 33.2 % (ref 40.5–52.5)
HEMOGLOBIN: 11.4 G/DL (ref 13.5–17.5)
LYMPHOCYTES ABSOLUTE: 1.4 K/UL (ref 1–5.1)
LYMPHOCYTES RELATIVE PERCENT: 20.1 %
MAGNESIUM: 1.9 MG/DL (ref 1.8–2.4)
MCH RBC QN AUTO: 30.7 PG (ref 26–34)
MCHC RBC AUTO-ENTMCNC: 34.2 G/DL (ref 31–36)
MCV RBC AUTO: 89.8 FL (ref 80–100)
MONOCYTES ABSOLUTE: 0.7 K/UL (ref 0–1.3)
MONOCYTES RELATIVE PERCENT: 10.7 %
NEUTROPHILS ABSOLUTE: 4.3 K/UL (ref 1.7–7.7)
NEUTROPHILS RELATIVE PERCENT: 62.6 %
PDW BLD-RTO: 14.9 % (ref 12.4–15.4)
PERFORMED ON: ABNORMAL
PHOSPHORUS: 5.4 MG/DL (ref 2.5–4.9)
PLATELET # BLD: 183 K/UL (ref 135–450)
PMV BLD AUTO: 8.9 FL (ref 5–10.5)
POTASSIUM SERPL-SCNC: 4.4 MMOL/L (ref 3.5–5.1)
PROCALCITONIN: 0.23 NG/ML (ref 0–0.15)
RBC # BLD: 3.7 M/UL (ref 4.2–5.9)
SODIUM BLD-SCNC: 138 MMOL/L (ref 136–145)
URIC ACID, SERUM: 8 MG/DL (ref 3.5–7.2)
WBC # BLD: 6.9 K/UL (ref 4–11)

## 2021-06-29 PROCEDURE — 6360000002 HC RX W HCPCS: Performed by: NURSE PRACTITIONER

## 2021-06-29 PROCEDURE — 92526 ORAL FUNCTION THERAPY: CPT

## 2021-06-29 PROCEDURE — 2500000003 HC RX 250 WO HCPCS: Performed by: INTERNAL MEDICINE

## 2021-06-29 PROCEDURE — 83735 ASSAY OF MAGNESIUM: CPT

## 2021-06-29 PROCEDURE — 80069 RENAL FUNCTION PANEL: CPT

## 2021-06-29 PROCEDURE — 6370000000 HC RX 637 (ALT 250 FOR IP): Performed by: INTERNAL MEDICINE

## 2021-06-29 PROCEDURE — 2700000000 HC OXYGEN THERAPY PER DAY

## 2021-06-29 PROCEDURE — 1200000000 HC SEMI PRIVATE

## 2021-06-29 PROCEDURE — 2500000003 HC RX 250 WO HCPCS: Performed by: NURSE PRACTITIONER

## 2021-06-29 PROCEDURE — 84550 ASSAY OF BLOOD/URIC ACID: CPT

## 2021-06-29 PROCEDURE — 6360000002 HC RX W HCPCS: Performed by: INTERNAL MEDICINE

## 2021-06-29 PROCEDURE — 84145 PROCALCITONIN (PCT): CPT

## 2021-06-29 PROCEDURE — P9047 ALBUMIN (HUMAN), 25%, 50ML: HCPCS | Performed by: INTERNAL MEDICINE

## 2021-06-29 PROCEDURE — 2580000003 HC RX 258: Performed by: INTERNAL MEDICINE

## 2021-06-29 PROCEDURE — 97166 OT EVAL MOD COMPLEX 45 MIN: CPT

## 2021-06-29 PROCEDURE — 36415 COLL VENOUS BLD VENIPUNCTURE: CPT

## 2021-06-29 PROCEDURE — 97530 THERAPEUTIC ACTIVITIES: CPT

## 2021-06-29 PROCEDURE — 85025 COMPLETE CBC W/AUTO DIFF WBC: CPT

## 2021-06-29 PROCEDURE — 94760 N-INVAS EAR/PLS OXIMETRY 1: CPT

## 2021-06-29 PROCEDURE — 93010 ELECTROCARDIOGRAM REPORT: CPT | Performed by: INTERNAL MEDICINE

## 2021-06-29 PROCEDURE — 97535 SELF CARE MNGMENT TRAINING: CPT

## 2021-06-29 PROCEDURE — 2580000003 HC RX 258: Performed by: NURSE PRACTITIONER

## 2021-06-29 RX ORDER — ATENOLOL 25 MG/1
25 TABLET ORAL DAILY
Status: DISCONTINUED | OUTPATIENT
Start: 2021-06-29 | End: 2021-07-01 | Stop reason: HOSPADM

## 2021-06-29 RX ADMIN — ALBUMIN (HUMAN) 25 G: 0.25 INJECTION, SOLUTION INTRAVENOUS at 08:27

## 2021-06-29 RX ADMIN — INSULIN LISPRO 4 UNITS: 100 INJECTION, SOLUTION INTRAVENOUS; SUBCUTANEOUS at 16:57

## 2021-06-29 RX ADMIN — LEVOTHYROXINE SODIUM 25 MCG: 0.03 TABLET ORAL at 06:11

## 2021-06-29 RX ADMIN — NIFEDIPINE 30 MG: 30 TABLET, FILM COATED, EXTENDED RELEASE ORAL at 08:37

## 2021-06-29 RX ADMIN — SODIUM BICARBONATE: 84 INJECTION, SOLUTION INTRAVENOUS at 11:52

## 2021-06-29 RX ADMIN — ACETAMINOPHEN 650 MG: 325 TABLET ORAL at 06:32

## 2021-06-29 RX ADMIN — ATORVASTATIN CALCIUM 40 MG: 40 TABLET, FILM COATED ORAL at 08:37

## 2021-06-29 RX ADMIN — HYDRALAZINE HYDROCHLORIDE 50 MG: 50 TABLET, FILM COATED ORAL at 06:11

## 2021-06-29 RX ADMIN — HYDRALAZINE HYDROCHLORIDE 50 MG: 50 TABLET, FILM COATED ORAL at 15:27

## 2021-06-29 RX ADMIN — ALBUMIN (HUMAN) 25 G: 0.25 INJECTION, SOLUTION INTRAVENOUS at 00:34

## 2021-06-29 RX ADMIN — INSULIN LISPRO 4 UNITS: 100 INJECTION, SOLUTION INTRAVENOUS; SUBCUTANEOUS at 11:52

## 2021-06-29 RX ADMIN — CLOPIDOGREL BISULFATE 75 MG: 75 TABLET ORAL at 08:37

## 2021-06-29 RX ADMIN — HYDRALAZINE HYDROCHLORIDE 50 MG: 50 TABLET, FILM COATED ORAL at 21:35

## 2021-06-29 RX ADMIN — INSULIN LISPRO 3 UNITS: 100 INJECTION, SOLUTION INTRAVENOUS; SUBCUTANEOUS at 21:36

## 2021-06-29 RX ADMIN — CEFEPIME HYDROCHLORIDE 1000 MG: 1 INJECTION, POWDER, FOR SOLUTION INTRAMUSCULAR; INTRAVENOUS at 03:36

## 2021-06-29 RX ADMIN — METRONIDAZOLE 500 MG: 500 INJECTION, SOLUTION INTRAVENOUS at 17:22

## 2021-06-29 RX ADMIN — METRONIDAZOLE 500 MG: 500 INJECTION, SOLUTION INTRAVENOUS at 02:07

## 2021-06-29 RX ADMIN — METRONIDAZOLE 500 MG: 500 INJECTION, SOLUTION INTRAVENOUS at 10:37

## 2021-06-29 RX ADMIN — ENOXAPARIN SODIUM 30 MG: 30 INJECTION SUBCUTANEOUS at 08:37

## 2021-06-29 ASSESSMENT — PAIN SCALES - GENERAL
PAINLEVEL_OUTOF10: 8
PAINLEVEL_OUTOF10: 0
PAINLEVEL_OUTOF10: 0

## 2021-06-29 NOTE — CARE COORDINATION
Son-in-law requesting script for hospital bed. Script wrote yesterday by Dr. Suzanne Maurice, order printed & given to APOORVA.     Electronically signed by Trung Alcaraz RN on 6/29/21 at 3:36 PM EDT

## 2021-06-29 NOTE — PROGRESS NOTES
Occupational Therapy   Occupational Therapy Initial Assessment/Treatment   Date: 2021   Patient Name: Betty Trammell  MRN: [id number]     : 1935    Date of Service: 2021    Discharge Recommendations:  Continue to assess pending progress, Patient would benefit from continued therapy after discharge  OT Equipment Recommendations  Equipment Needed: No       Betty Trammell scored a  on the AM-PAC ADL Inpatient form. Current research shows that an AM-PAC score of 17 or less is typically not associated with a discharge to the patient's home setting. Based on the patient's AM-PAC score and their current ADL deficits, it is recommended that the patient have 3-5 sessions per week of Occupational Therapy at d/c to increase the patient's independence. Please see assessment section for further patient specific details. If patient discharges prior to next session this note will serve as a discharge summary. Please see below for the latest assessment towards goals. Assessment   Performance deficits / Impairments: Decreased functional mobility ; Decreased balance;Decreased ADL status; Decreased endurance;Decreased strength  Assessment: Pt is a 81yr old male admitted for BLE edema and AMS. At baseline, family assist with all ADLs. Pt is able to ambulate short distance to bedside commode with RW and physical assist from family. Family is hopeful to return home with 24hr assist. Anticipate pt would benefit from ongoing OT in order to increase functional status. If 24hr/hr assist it not available, pt would benefit from a short rehab stay in order to increase independence. Prognosis: Fair  Decision Making: Medium Complexity  OT Education: OT Role;Transfer Training;Precautions; ADL Adaptive Strategies; Family Education;Orientation; Energy Conservation  Patient Education: d/c recommendation,  Barriers to Learning: cognition  REQUIRES OT FOLLOW UP: Yes  Activity Tolerance  Activity Tolerance: Patient limited by fatigue;Treatment limited secondary to decreased cognition;Patient Tolerated treatment well  Safety Devices  Safety Devices in place: Yes  Type of devices: All fall risk precautions in place; Left in chair;Chair alarm in place;Call light within reach; Patient at risk for falls           Patient Diagnosis(es): The primary encounter diagnosis was Peripheral edema. Diagnoses of Essential hypertension and Acute kidney injury superimposed on chronic kidney disease ([address] were also pertinent to this visit. has a past medical history of Arthritis, Circulation problem, Diabetes mellitus ([address] Gangrene of toe ([address] Hyperlipidemia, Hypertension, Toe amputation status, and Wears glasses. has a past surgical history that includes hernia repair; Toe amputation (4/4/2013); Foot Amputation (9/16/13 ); skin split graft (9/16/13); Toe amputation (9/16/13); pr amputation toe,mt-p jt (Right 2nd and 3rd toe); Cardiac catheterization; Colonoscopy (9/20/11); and Colonoscopy (04/20/2017). Restrictions  Restrictions/Precautions  Restrictions/Precautions: Fall Risk  Position Activity Restriction  Other position/activity restrictions: IV; O2 via NC    Subjective   General  Chart Reviewed: Yes, Orders, History and Physical, Imaging, Labs  Patient assessed for rehabilitation services?: Yes  Family / Caregiver Present: Yes (wife and daughter)  Referring Practitioner: Ricardo Moralez MD  Diagnosis: BLE edema  Subjective  Subjective: Pt pleasant, agreeable to OT evaluation.  Pt requesting to use the bathroom  Vital Signs  Resp: 18  Oxygen Therapy  SpO2: 92 %  Pulse Oximeter Device Mode: Intermittent  Pulse Oximeter Device Location: Right;Finger  O2 Device: Nasal cannula  O2 Flow Rate (L/min): 1 L/min  Social/Functional History  Social/Functional History  Lives With: Spouse  Type of Home: House  Home Layout: One level  Home Access: Ramped entrance  Bathroom Shower/Tub: Walk-in shower  Bathroom Toilet: Standard  Bathroom Equipment: Shower chair, Grab bars around toilet, 3-in-1 commode  Home Equipment: Rolling walker, [employer]  ADL Assistance:  (Wife and family assist with bathing, dressing, and self-care)  Homemaking Assistance:  (Family assists)  Ambulation Assistance: Needs assistance (Pt able to ambulate in home with RW, supervision. Uses wc for community mobility)  Transfer Assistance: Needs assistance (Variable level of assistance (could be Min-Max depending on energy level/mood). )  Active : No  Additional Comments: No hx of recent falls. Family assisted in providing social hx. Objective        Orientation  Overall Orientation Status: Impaired  Orientation Level: Oriented to person  Observation/Palpation  Posture: Fair  Balance  Sitting Balance: Contact guard assistance  Standing Balance: Dependent/Total (min-mod x2 in RW; Min A in [place])  Functional Mobility  Functional - Mobility Device: Other  Activity: To/from bathroom  Assist Level: Dependent/Total  Functional Mobility Comments: Min to mod x2 for RW into bathroom; Stedy from bathroom to bedside chair  Toilet Transfers  Toilet - Technique: Ambulating  Equipment Used: Standard toilet  Toilet Transfer: 2 Person assistance  Toilet Transfers Comments: min to mod x2 with RW to bathroom toilet; gabby stedy off of toilet due to fatigue  ADL  LE Bathing: Dependent/Total  LE Dressing: Dependent/Total  Toileting: Dependent/Total  Tone RUE  RUE Tone: Normotonic  Tone LUE  LUE Tone: Normotonic     Bed mobility  Supine to Sit: Moderate assistance  Sit to Supine: Unable to assess  Scooting: Maximal assistance  Transfers  Sit to stand: 2 Person assistance; Moderate assistance;Dependent/Total  Stand to sit: Moderate assistance;2 Person assistance;Dependent/Total  Transfer Comments: up to RW; Min A to stand to [employer]  Overall Cognitive Status: Exceptions  Arousal/Alertness: Delayed responses to stimuli  Following Commands:  Follows one step commands with repetition  Attention Span: Attends with cues to redirect  Memory: Decreased short term memory;Decreased recall of recent events  Safety Judgement: Decreased awareness of need for assistance;Decreased awareness of need for safety  Insights: Decreased awareness of deficits  Initiation: Requires cues for all  Sequencing: Requires cues for all                    LUE Strength  Gross LUE Strength: Exceptions to [facility]  RUE Strength  Gross RUE Strength: Exceptions to [address]  Times per week: 3-5  Current Treatment Recommendations: Strengthening, Endurance Training, Patient/Caregiver Education & Training, Balance Training, Self-Care / ADL, Safety Education & Training, Positioning, Equipment Evaluation, Education, & procurement, Functional Mobility Training             AM-PAC Score        AM-PAC Inpatient Daily Activity Raw Score: 11 (06/29/21 1127)  AM-PAC Inpatient ADL T-Scale Score : 29.04 (06/29/21 1127)  ADL Inpatient CMS 0-100% Score: 70.42 (06/29/21 1127)  ADL Inpatient CMS G-Code Modifier : CL (06/29/21 1127)    Goals  Short term goals  Time Frame for Short term goals: by d/c:  Short term goal 1: Pt will complete stand pivot transfer with RW to bedside commode with min A  Short term goal 2: Pt will tolerate x1 mins of static standing with no more than CGA for balance in order to increase independence with standing ADLs       Therapy Time   Individual Concurrent Group Co-treatment   Time In 1020         Time Out 1115         Minutes 55         Timed Code Treatment Minutes: [address], OTR/L

## 2021-06-29 NOTE — PROGRESS NOTES
[facility]   Speech Therapy  Daily Dysphagia Treatment Note        Heike Perez  AGE: 81 y.o. GENDER: male  : 1935  [id number]  EPISODE DATE:  2021  Patient Active Problem List   Diagnosis    Mixed hyperlipidemia    Essential hypertension, benign    Type 2 diabetes mellitus with renal manifestations ([address]    Prostate cancer ([address]    Osteoarthritis of left knee    Ulcer of foot ([address]    Carotid artery stenosis    Atherosclerosis of leg with intermittent claudication (HCC)    Gangrene of toe (HCC)    Atherosclerosis of native arteries of the extremities with ulceration (HCC)    Osteomyelitis of foot, left, acute ([address]    Callus    Atherosclerosis of native artery of both lower extremities with intermittent claudication (HCC)    Acquired hypothyroidism    Leg swelling    Fluid overload     Allergies   Allergen Reactions    Penicillins Hives     Treatment Diagnosis: Dysphagia     Chart review:   MBS completed 21:   Mild-moderate oral stage dysphagia characterized by decreased mastication and decreased lingual manipulation. Prolonged but adequate mastication with comparable effort/labor across textures with textured solids. disorganized, prolonged bolus foration and movement with puree and textured solids. Premature bolus loss to the pharynx with nectar and thin.     Mild pharyngeal stage dysphagia characterized by delayed swallow, decreased laryngeal elevation, and decreased pharyngeal peristalsis. Premature spillage to the valleculae with nectar nadh thin and to the pyriform with thin. NO penetration/aspiration or significant residue post-swallow. CT CHEST 21:  Impression   Bilateral airspace disease primarily right lower lobe with pleural effusion   most likely multifocal pneumonia.       The gallbladder is almost entirely empty but does show calcified gallstones.           2021 CXR  Impression   Right basilar atelectasis, aspiration or pneumonia.      6/25/2021 CXR  Impression   Subtle patchy opacities noted at the right lung base and left upper lung   which may reflect multifocal airspace disease.      6/26/2021 late PM RAPID RESPONSE: Patient was found to be hypoglycemic with blood sugars in the 20s.  Patient was given oral glucose followed by half amp of dextrose.  After 20-30 minutes he did have improvement in his level of arousability.  Prandial insulin will be held at this time, will also hold Lantus this evening.  Patient was started on D10 at 25 an hour as he is admitted with volume overload.  Patient is now opening eyes and looking around the room.  Son reports patient is not very verbal. Isa Bone is spontaneously opening his eyes without stimulation.  Will repeat Accu-Chek in 1 hour. Chest x-ray was obtained as there is concern for aspiration    6/25/2021 admitted with c/o leg swelling: ADMISSION H&P HPI:  81-year-old male with past medical history of hypertension, diabetes mellitus, hypothyroidism, history of peripheral arterial disease with right toe amputation, left TMA presented to the hospital with complains of bilateral leg swelling and shortness of breath.  Patient does not speak much and this responds with yes and no most of the time.  As per his daughter he has been getting more short of breath and worsening bilateral lower extremity edema.  Due to worsening symptoms he was brought to the hospital.  On arrival he was noted to be hypertensive blood pressure 200/100 chest x-ray was performed which showed findings concerning for multifocal airspace disease.  proBNP was noted to be elevated at 33,000.  Creatinine was 3.1.  Patient was admitted to the hospital for further work-up and management.     Subjective:     Current diet: Regular, thin liquids  Comments regarding tolerating Current Diet: RN denies any concerns,     Objective:     Pain   Patient Currently in Pain: Yes    Cognitive/Behavior   Behavior/Cognition: Alert, Pleasant mood, Cooperative, Confused    Presentations   Consistencies Administered: Dysphagia Thin - straw, Reg solid    Positioning: upright in bed    PO Trials:  · Thin Liquids via straw: delayed swallow, decreased laryngeal elevation, hard audible swallow, no coughing episode, throat clear or change in vocal quality  · Nectar thick liquids: DNT  · Honey Thick liquids: DNT  · Puree: DNT  · Soft food: DNT  · Regular food: disorganized and prolonged mastication with adequate clearance of bolus; no cough, throat clear or change in vocal quality. Dysphagia Tx:   · Patient sitting upright in bed upon therapist entering room, patient's wife and daughter present for session  · Patient oriented to self only, alert, responsive, able to follow simple directions and with minimal verbal responsiveness  · Patient's wife and daughter present for session and educated on results of MBS, swallowing deficits and recommended compensatory strategies  · Patient seen for intake of regular texture snack. Patient consumed with disorganized mod prolonged mastication with minimal oral residue cleared with cue for liquid wash. Min cues for small sips and smaller bites. Patient tolerated with no overt s/s of aspiration or penetration. Goals:   Dysphagia Goals: The patient will tolerate recommended diet without observed clinical signs of aspiration: ongoing; tolerated with no overt s/s of aspiration or penetration  The patient/caregiver will demonstrate understanding of compensatory strategies for improved swallowing safety: Ongoing; patient's daughter and wife educated on results of MBS and recommended compensatory strategies    Assessment:   Impressions:   Dysphagia Diagnosis: Mild oral stage dysphagia, Mild pharyngeal stage dysphagia    Mild-moderate oral stage dysphagia characterized by decreased mastication and decreased lingual manipulation. Prolonged but adequate mastication with comparable effort/labor across textures with textured solids. disorganized, prolonged bolus foration and movement with puree and textured solids. Premature bolus loss to the pharynx with nectar and thin.     Mild pharyngeal stage dysphagia characterized by delayed swallow, decreased laryngeal elevation, and decreased pharyngeal peristalsis. Premature spillage to the valleculae with nectar and thin and to the pyriform with thin. No penetration/aspiration or significant residue post-swallow. Diet Recommendations:  Solid consistency: Regular   Liquid consistency: Thin   Medication administration: Meds in puree   Recommended Form of Meds: Meds in puree    Strategies:   Compensatory Swallowing Strategies: Upright as possible for all oral intake, Remain upright for 30-45 minutes after meals    Education:  Consulted and agree with results and recommendations: Patient, family  Patient Education: Completed on results/recs/plan; results of MBS  Patient Education Response: Needs reinforcement    Prognosis:   Fair for diet tolerance    Plan:     Continue Dysphagia Therapy:   Interventions: Therapeutic Interventions: Diet tolerance monitoring, Patient/Family education  Duration/Frequency of therapy while on unit: Duration/Frequency of Treatment  Duration/Frequency of Treatment: ST to follow-up 1-3 times during acute admission unless othrwise noted  Discharge Instructions:   Anticipate No for further skilled Speech Therapy for Dysphagia at discharge    This note serves as a D/C Summary in the event that this patient is discharged prior to the next therapy session.     Time in: 09:49 AM  Time out: 10:19 AM  Coded treatment time: 0  Total treatment time:   Lilibeth Villalobos, [address] [place] [address]  Speech-Language Pathologist  On 6/29/2021 at 09:49 AM

## 2021-06-29 NOTE — PROGRESS NOTES
Department of Internal Medicine  Nephrology Progress Note      HPI: Mr Saroj Zapata is an 80 yo male with a past medical history significant for DM II, hypertension, peripheral vascular disease, chronic kidney disease stage  IV, follows up with Dr. Keyshawn Weeks (baseline creatinine in 2.9-3.2 mg/dl range recently), brought to ER by the family because of uncontrolled diabetes and blood pressure with worsening lower extremity edema and some shortness of breath and dyspnea on exertion. At the time  of consultation, the patient was found to be in MELVIN    SUBJECTIVE:  Resting in recliner; breathing and edema according to family members has improved     Family at bedside     Physical Exam:    VITALS:  BP (!) 169/75   Pulse 64   Temp 97.3 °F (36.3 °C) (Axillary)   Resp 18   Ht 5' 4\" (1.626 m)   Wt 158 lb 15.2 oz (72.1 kg)   SpO2 94%   BMI 27.28 kg/m²   24HR INTAKE/OUTPUT:      Intake/Output Summary (Last 24 hours) at 6/29/2021 1118  Last data filed at 6/29/2021 0844  Gross per 24 hour   Intake 1365 ml   Output 400 ml   Net 965 ml       Constitutional:  Alert,awake  Respiratory:  Decrease BS bilaterally but clear to auscultation  Gastrointestinal:  No  tenderness.   Normal Bowel Sounds  Cardiovascular:  RRR   Edema:  +  edema     [Held by provider] atenolol  25 mg Oral Daily    metroNIDAZOLE  500 mg Intravenous Q8H    cefepime  1,000 mg Intravenous Q24H    NIFEdipine  30 mg Oral Daily    hydrALAZINE  50 mg Oral 3 times per day    atorvastatin  40 mg Oral Daily    clopidogrel  75 mg Oral Daily    levothyroxine  25 mcg Oral QAM AC    sodium chloride flush  5-40 mL Intravenous 2 times per day    enoxaparin  30 mg Subcutaneous Daily    insulin lispro  0-12 Units Subcutaneous TID WC    insulin lispro  0-6 Units Subcutaneous Nightly       DATA:    CBC:  Lab Results   Component Value Date    WBC 6.9 06/29/2021    RBC 3.70 06/29/2021    HGB 11.4 06/29/2021    HCT 33.2 06/29/2021    MCV 89.8 06/29/2021    MCH 30.7 06/29/2021

## 2021-06-29 NOTE — PROGRESS NOTES
Nutrition Note    Pt with new CHF diagnosis. Pt was sleeping. RD provided CHF nutrition education to family member / caregiver. Education went over not adding salt to foods, choosing fresh/frozen vegetables over canned, avoiding high salt meats (benjamin, sausage, hot dogs, lunch meat), and choosing low sodium snack options. Education also went over fluid restriction and what counts as a fluid. Family member with no questions at this time. Handout left with them.      Electronically signed by Josseline Gonzalez RD, LD on 6/29/21 at 2:27 PM EDT    Contact: [phone]

## 2021-06-29 NOTE — PROGRESS NOTES
Hospitalist Progress Note      PCP: Bib Helton MD    Date of Admission: 6/25/2021    Chief Complaint: Bilateral lower extremity edema, shortness of breath    Hospital Course: 80-year-old male with past medical history of hypertension, diabetes mellitus, hypothyroidism, history of peripheral arterial disease with right toe amputation, left TMA presented to the hospital with complains of bilateral leg swelling and shortness of breath. Noted to be in fluid overload    6/27: Had hypoglycemia episode overnight. Per family, he rarely takes more than 5U prandial insulin even though he is prescribed 10U. Was placed on D10 and blood glucose improved overnight. Also had worsening hypoxia and CXR showed atelectasis vs pneumonia and he was started on cefepime and Flagyl. He feels better today than yesterday. Weaned down to 1L. Subjective: Pt seen and examined. Feels ok, a little nauseous this morning.      Medications:  Reviewed    Infusion Medications    furosemide (LASIX) 1mg/ml infusion 5 mg/hr (06/28/21 1600)    sodium chloride      dextrose       Scheduled Medications    [Held by provider] atenolol  25 mg Oral Daily    metroNIDAZOLE  500 mg Intravenous Q8H    cefepime  1,000 mg Intravenous Q24H    NIFEdipine  30 mg Oral Daily    hydrALAZINE  50 mg Oral 3 times per day    atorvastatin  40 mg Oral Daily    clopidogrel  75 mg Oral Daily    levothyroxine  25 mcg Oral QAM AC    sodium chloride flush  5-40 mL Intravenous 2 times per day    enoxaparin  30 mg Subcutaneous Daily    insulin lispro  0-12 Units Subcutaneous TID WC    insulin lispro  0-6 Units Subcutaneous Nightly     PRN Meds: melatonin, docusate sodium, sodium chloride flush, sodium chloride, ondansetron **OR** ondansetron, polyethylene glycol, acetaminophen **OR** acetaminophen, perflutren lipid microspheres, hydrALAZINE, glucose, dextrose, glucagon (rDNA), dextrose      Intake/Output Summary (Last 24 hours) at 6/29/2021 1112  Last data RBCUA 0-2 06/26/2021    BLOODU Negative 06/26/2021    SPECGRAV 1.010 06/26/2021    GLUCOSEU 100 06/26/2021    GLUCOSEU Negative 12/20/2011       Radiology:  Fluoroscopy modified barium swallow with video   Final Result   No aspiration noted      Please see separate speech pathology report for full discussion of findings   and recommendations. CT CHEST WO CONTRAST   Final Result   Bilateral airspace disease primarily right lower lobe with pleural effusion   most likely multifocal pneumonia. The gallbladder is almost entirely empty but does show calcified gallstones. XR CHEST PORTABLE   Final Result   Right basilar atelectasis, aspiration or pneumonia. XR CHEST PORTABLE   Final Result   Subtle patchy opacities noted at the right lung base and left upper lung   which may reflect multifocal airspace disease.                  Assessment/Plan:    Active Hospital Problems    Diagnosis     Fluid overload [E87.70]         Bilateral lower extremity swelling with concern for fluid overload  Acute diastolic heart failure  -Stopped diuresis and giving back some fluid today  -Echo with EF 36-92%, grade 1 diastolic dysfunction  -Nephrology following  -Strict input and output    Acute respiratory failure with hypoxia - likely 2/2 fluid overload and/or aspiration pneumonia  -continue diuresis as above  -wean oxygen to maintain SpO2 > 89%  -trend procalcitonin  -Strep pneumo, Legionella urine antigen negative  -respiratory culture pend  -continue empiric cefepime/Flagyl  -CT Chest without contrast showed evidence of multifocal pneumonia     Hypertension  -Continue nifedipine, hydralazine  --hold BB today with bradycardia     CKD stage IV - worsening  -Continue monitor creatinine, slightly worsening  --nephrology following  --stopped diuresis and giving back IVF     Diabetes mellitus, with hypoglycemic episode overnight  -stop D10  -hold scheduled Humalog and Lantus  -continue SSI  -hypoglycemia protocol  -POCT glucose checks  -carb control diet     Peripheral arterial disease  -Status post right second and third toe amputation, left TMA  -Continue Plavix, Lipitor     Hypothyroidism  -Continue Synthroid    DVT Prophylaxis: Lovenox  Diet: ADULT DIET; Regular; 3 carb choices (45 gm/meal);  Low Sodium (2 gm); 1500 ml  Code Status: Full Code    Dispo -continue to treat pneumonia, await improvement in kidney function    Federico Hansen MD

## 2021-06-29 NOTE — PROGRESS NOTES
Physical Therapy  Facility/Department: [address] MED SURG  Daily Treatment Note  NAME: Alisa Jiménez  : 1935  MRN: [id number]    Date of Service: 2021    Discharge Recommendations:  Patient would benefit from continued therapy after discharge, 24 hour supervision or assist, Home with Home health PT, S Level 3, 3-5 sessions per week   PT Equipment Recommendations  Equipment Needed: No    Assessment   Body structures, Functions, Activity limitations: Decreased functional mobility ; Decreased endurance;Decreased safe awareness;Decreased balance;Decreased ADL status; Decreased strength  Assessment: Pt is an 81 y.o. M. admitted  for CHF, respiratory failure, DM. He is confused at baseline, and family has been providing assist at home for self-care/mobility. Today he required Mod-Max A to ambulate short distances in room. He would benefit from continued therapy to improve his balance, strength, safety awareness, and independence transferring and ambulating. Family is intent upon bringing pt home, and have been providing 24/7 assist.  If pt return home, he will need to function at the wc level (family has a wc), and will require physical lifting assist for transfers and any ambulation. Also recommend home PT level 1. If family cannot provide 24/7 assist, recommend low-moderate frequency therapy upon D/C. Specific instructions for Next Treatment: Improve strength, balance, ambulation  Prognosis: Fair  Decision Making: High Complexity  History: See below  Exam: Strength; ROM; Balance; Ambulation  Clinical Presentation: Unstable  PT Education: Goals; General Safety;Gait Training;PT Role;Orientation;Plan of Care;Precautions; Functional Mobility Training; Injury Prevention;Transfer Training  Barriers to Learning: Fatigue; Confusion  REQUIRES PT FOLLOW UP: Yes  Activity Tolerance  Activity Tolerance: Patient limited by fatigue;Patient Tolerated treatment well     Patient Diagnosis(es): The primary encounter diagnosis was Peripheral edema. Diagnoses of Essential hypertension and Acute kidney injury superimposed on chronic kidney disease ([address] were also pertinent to this visit. has a past medical history of Arthritis, Circulation problem, Diabetes mellitus ([address] Gangrene of toe ([address] Hyperlipidemia, Hypertension, Toe amputation status, and Wears glasses. has a past surgical history that includes hernia repair; Toe amputation (4/4/2013); Foot Amputation (9/16/13 ); skin split graft (9/16/13); Toe amputation (9/16/13); pr amputation toe,mt-p jt (Right 2nd and 3rd toe); Cardiac catheterization; Colonoscopy (9/20/11); and Colonoscopy (04/20/2017). Restrictions  Restrictions/Precautions  Restrictions/Precautions: Fall Risk  Position Activity Restriction  Other position/activity restrictions: IV; O2 via NC     Subjective   General  Chart Reviewed: Yes  Additional Pertinent Hx: Per Dr. Alana Grewal, \"81year-old male with past medical history of hypertension, diabetes mellitus, hypothyroidism, history of peripheral arterial disease with right toe amputation, left TMA presented to the hospital with complains of bilateral leg swelling and shortness of breath. Noted to be in fluid overload. 6/27: Had hypoglycemia episode overnight. Per family, he rarely takes more than 5U prandial insulin even though he is prescribed 10U. Was placed on D10 and blood glucose improved overnight. Also had worsening hypoxia and CXR showed atelectasis vs pneumonia and he was started on cefepime and Flagyl. He feels better today than yesterday. Weaned down to 1L. \"  Response To Previous Treatment: Patient unable to report, no changes reported from family or staff  Family / Caregiver Present: Yes (dtr and spouse)  Referring Practitioner: Dr. Madie Campbell: Pt pleasantly confused. Family present.           Orientation  Orientation  Overall Orientation Status: Impaired  Orientation Level: Disoriented to time;Oriented to place; Disoriented to situation;Oriented to person     Cognition   Cognition  Overall Cognitive Status: Exceptions  Arousal/Alertness: Appropriate responses to stimuli  Following Commands: Follows one step commands with repetition  Attention Span: Attends with cues to redirect  Safety Judgement: Decreased awareness of need for assistance;Decreased awareness of need for safety  Insights: Decreased awareness of deficits  Initiation: Requires cues for all  Sequencing: Requires cues for all     Objective   Bed mobility  Supine to Sit: Moderate assistance  Sit to Supine: Unable to assess (in recliner at end of session)  Scooting: Maximal assistance  Transfers  Sit to Stand: Moderate Assistance;Maximum Assistance;2 Person Assistance (intermittent assist of 1 vs 2)  Stand to sit: Moderate Assistance;Maximum Assistance;2 Person Assistance (intermittent assist of 1 vs 2)  Comment: Used stedy to transport from toilet to recliner  Ambulation  Ambulation?: Yes  Ambulation 1  Surface: level tile  Device: [address]: Moderate assistance;Maximum assistance  Quality of Gait: Pt limited by fatigue, taking short steps. Required assist for directioning of RW. Gait Deviations: Slow Sofia;Decreased step length;Decreased step height  Distance: 10'  Comments: ambulated to toilet where he had a BM - dependent for pericare. Stairs/Curb  Stairs?: No     Balance  Posture: Fair  Sitting - Static: Good  Sitting - Dynamic: Good  Standing - Static: Poor;+ (@RW)  Standing - Dynamic: Poor (@RW)            AM-PAC Score  AM-PAC Inpatient Mobility Raw Score : 11 (06/29/21 1109)  AM-PAC Inpatient T-Scale Score : 33.86 (06/29/21 1109)  Mobility Inpatient CMS 0-100% Score: 72.57 (06/29/21 1109)  Mobility Inpatient CMS G-Code Modifier : CL (06/29/21 1109)          Goals  Short term goals  Time Frame for Short term goals:  In 2-3 days pt will perform - all goals ongoing as of 6/29/21  Short term goal 1: Bed mobility Min A  Short term goal 2: Transfers Min A  Short term goal 3: Ambulation 22' with RW, Min A  Long term goals  Time Frame for Long term goals : LTG = STG  Patient Goals   Patient goals : Family is hopeful pt can return home    Plan    Plan  Times per week: 2-3x  Specific instructions for Next Treatment: Improve strength, balance, ambulation  Current Treatment Recommendations: Strengthening, Balance Training, Endurance Training, Functional Mobility Training, Transfer Training, Gait Training, Stair training, Positioning, Equipment Evaluation, Education, & procurement, Patient/Caregiver Education & Training, Home Exercise Program, Neuromuscular Re-education, Safety Education & Training, Wheelchair Mobility Training  Safety Devices  Type of devices:  All fall risk precautions in place, Call light within reach, Chair alarm in place, Gait belt, Left in chair, Nurse notified  Restraints  Initially in place: No     Therapy Time   Individual Concurrent Group Co-treatment   Time In 1025         Time Out 1108         Minutes 43         Timed Code Treatment Minutes: FAHEEM Lopez 39, PT

## 2021-06-30 LAB
ALBUMIN SERPL-MCNC: 3.5 G/DL (ref 3.4–5)
ANION GAP SERPL CALCULATED.3IONS-SCNC: 18 MMOL/L (ref 3–16)
BASOPHILS ABSOLUTE: 0.1 K/UL (ref 0–0.2)
BASOPHILS RELATIVE PERCENT: 1.4 %
BUN BLDV-MCNC: 66 MG/DL (ref 7–20)
CALCIUM SERPL-MCNC: 8.2 MG/DL (ref 8.3–10.6)
CHLORIDE BLD-SCNC: 100 MMOL/L (ref 99–110)
CO2: 22 MMOL/L (ref 21–32)
CREAT SERPL-MCNC: 4.2 MG/DL (ref 0.8–1.3)
CULTURE, RESPIRATORY: ABNORMAL
CULTURE, RESPIRATORY: ABNORMAL
EOSINOPHILS ABSOLUTE: 0.4 K/UL (ref 0–0.6)
EOSINOPHILS RELATIVE PERCENT: 4.9 %
GFR AFRICAN AMERICAN: 16
GFR NON-AFRICAN AMERICAN: 14
GLUCOSE BLD-MCNC: 107 MG/DL (ref 70–99)
GLUCOSE BLD-MCNC: 108 MG/DL (ref 70–99)
GLUCOSE BLD-MCNC: 221 MG/DL (ref 70–99)
GLUCOSE BLD-MCNC: 233 MG/DL (ref 70–99)
GLUCOSE BLD-MCNC: 254 MG/DL (ref 70–99)
GRAM STAIN RESULT: ABNORMAL
HCT VFR BLD CALC: 32.4 % (ref 40.5–52.5)
HEMOGLOBIN: 11 G/DL (ref 13.5–17.5)
LYMPHOCYTES ABSOLUTE: 1.5 K/UL (ref 1–5.1)
LYMPHOCYTES RELATIVE PERCENT: 17.8 %
MAGNESIUM: 1.8 MG/DL (ref 1.8–2.4)
MCH RBC QN AUTO: 30 PG (ref 26–34)
MCHC RBC AUTO-ENTMCNC: 34.1 G/DL (ref 31–36)
MCV RBC AUTO: 88.1 FL (ref 80–100)
MONOCYTES ABSOLUTE: 0.8 K/UL (ref 0–1.3)
MONOCYTES RELATIVE PERCENT: 9.1 %
NEUTROPHILS ABSOLUTE: 5.8 K/UL (ref 1.7–7.7)
NEUTROPHILS RELATIVE PERCENT: 66.8 %
ORGANISM: ABNORMAL
PDW BLD-RTO: 15.1 % (ref 12.4–15.4)
PERFORMED ON: ABNORMAL
PHOSPHORUS: 5.6 MG/DL (ref 2.5–4.9)
PLATELET # BLD: 183 K/UL (ref 135–450)
PMV BLD AUTO: 8.8 FL (ref 5–10.5)
POTASSIUM SERPL-SCNC: 4.1 MMOL/L (ref 3.5–5.1)
PROCALCITONIN: 0.26 NG/ML (ref 0–0.15)
RBC # BLD: 3.67 M/UL (ref 4.2–5.9)
SODIUM BLD-SCNC: 140 MMOL/L (ref 136–145)
URIC ACID, SERUM: 7.6 MG/DL (ref 3.5–7.2)
WBC # BLD: 8.6 K/UL (ref 4–11)

## 2021-06-30 PROCEDURE — 6370000000 HC RX 637 (ALT 250 FOR IP): Performed by: INTERNAL MEDICINE

## 2021-06-30 PROCEDURE — 84145 PROCALCITONIN (PCT): CPT

## 2021-06-30 PROCEDURE — 2500000003 HC RX 250 WO HCPCS: Performed by: INTERNAL MEDICINE

## 2021-06-30 PROCEDURE — 36415 COLL VENOUS BLD VENIPUNCTURE: CPT

## 2021-06-30 PROCEDURE — 85025 COMPLETE CBC W/AUTO DIFF WBC: CPT

## 2021-06-30 PROCEDURE — 6360000002 HC RX W HCPCS: Performed by: INTERNAL MEDICINE

## 2021-06-30 PROCEDURE — 80069 RENAL FUNCTION PANEL: CPT

## 2021-06-30 PROCEDURE — 1200000000 HC SEMI PRIVATE

## 2021-06-30 PROCEDURE — 83735 ASSAY OF MAGNESIUM: CPT

## 2021-06-30 PROCEDURE — 2580000003 HC RX 258: Performed by: INTERNAL MEDICINE

## 2021-06-30 PROCEDURE — 84550 ASSAY OF BLOOD/URIC ACID: CPT

## 2021-06-30 PROCEDURE — 6360000002 HC RX W HCPCS: Performed by: NURSE PRACTITIONER

## 2021-06-30 PROCEDURE — 2580000003 HC RX 258: Performed by: NURSE PRACTITIONER

## 2021-06-30 PROCEDURE — 2500000003 HC RX 250 WO HCPCS: Performed by: NURSE PRACTITIONER

## 2021-06-30 RX ADMIN — INSULIN LISPRO 4 UNITS: 100 INJECTION, SOLUTION INTRAVENOUS; SUBCUTANEOUS at 17:36

## 2021-06-30 RX ADMIN — ENOXAPARIN SODIUM 30 MG: 30 INJECTION SUBCUTANEOUS at 09:09

## 2021-06-30 RX ADMIN — METRONIDAZOLE 500 MG: 500 INJECTION, SOLUTION INTRAVENOUS at 09:09

## 2021-06-30 RX ADMIN — HYDRALAZINE HYDROCHLORIDE 75 MG: 50 TABLET, FILM COATED ORAL at 15:25

## 2021-06-30 RX ADMIN — SODIUM BICARBONATE: 84 INJECTION, SOLUTION INTRAVENOUS at 03:59

## 2021-06-30 RX ADMIN — SODIUM BICARBONATE: 84 INJECTION, SOLUTION INTRAVENOUS at 21:07

## 2021-06-30 RX ADMIN — CEFAZOLIN SODIUM 2000 MG: 10 INJECTION, POWDER, FOR SOLUTION INTRAVENOUS at 19:20

## 2021-06-30 RX ADMIN — NIFEDIPINE 30 MG: 30 TABLET, FILM COATED, EXTENDED RELEASE ORAL at 09:09

## 2021-06-30 RX ADMIN — CLOPIDOGREL BISULFATE 75 MG: 75 TABLET ORAL at 09:09

## 2021-06-30 RX ADMIN — LEVOTHYROXINE SODIUM 25 MCG: 0.03 TABLET ORAL at 05:04

## 2021-06-30 RX ADMIN — ATORVASTATIN CALCIUM 40 MG: 40 TABLET, FILM COATED ORAL at 09:09

## 2021-06-30 RX ADMIN — INSULIN LISPRO 4 UNITS: 100 INJECTION, SOLUTION INTRAVENOUS; SUBCUTANEOUS at 12:41

## 2021-06-30 RX ADMIN — METRONIDAZOLE 500 MG: 500 INJECTION, SOLUTION INTRAVENOUS at 01:55

## 2021-06-30 RX ADMIN — CEFEPIME HYDROCHLORIDE 1000 MG: 1 INJECTION, POWDER, FOR SOLUTION INTRAMUSCULAR; INTRAVENOUS at 01:54

## 2021-06-30 RX ADMIN — HYDRALAZINE HYDROCHLORIDE 10 MG: 20 INJECTION INTRAMUSCULAR; INTRAVENOUS at 10:18

## 2021-06-30 RX ADMIN — HYDRALAZINE HYDROCHLORIDE 50 MG: 50 TABLET, FILM COATED ORAL at 05:04

## 2021-06-30 RX ADMIN — INSULIN LISPRO 3 UNITS: 100 INJECTION, SOLUTION INTRAVENOUS; SUBCUTANEOUS at 21:28

## 2021-06-30 RX ADMIN — METRONIDAZOLE 500 MG: 500 INJECTION, SOLUTION INTRAVENOUS at 17:36

## 2021-06-30 RX ADMIN — HYDRALAZINE HYDROCHLORIDE 75 MG: 50 TABLET, FILM COATED ORAL at 21:28

## 2021-06-30 ASSESSMENT — PAIN SCALES - GENERAL: PAINLEVEL_OUTOF10: 0

## 2021-06-30 NOTE — CARE COORDINATION
Advance Care Planning     Advance Care Planning Activator (Inpatient)  Conversation Note      Date of ACP Conversation: 6/30/2021     Conversation Conducted with: Patient with Decision Making Capacity    ACP Activator: Lidya Villagomez RN      Health Care Decision Maker:     Current Designated Health Care Decision Maker:     Primary Decision Maker: Dyllan Arnoldotorri Child - [phone]       Care Preferences    Ventilation: \"If you were in your present state of health and suddenly became very ill and were unable to breathe on your own, what would your preference be about the use of a ventilator (breathing machine) if it were available to you? \"      Would the patient desire the use of ventilator (breathing machine)?: yes    \"If your health worsens and it becomes clear that your chance of recovery is unlikely, what would your preference be about the use of a ventilator (breathing machine) if it were available to you? \"     Would the patient desire the use of ventilator (breathing machine)?: No      Resuscitation  \"CPR works best to restart the heart when there is a sudden event, like a heart attack, in someone who is otherwise healthy. Unfortunately, CPR does not typically restart the heart for people who have serious health conditions or who are very sick. \"    \"In the event your heart stopped as a result of an underlying serious health condition, would you want attempts to be made to restart your heart (answer \"yes\" for attempt to resuscitate) or would you prefer a natural death (answer \"no\" for do not attempt to resuscitate)? \" yes       [] Yes   [] No   Educated Patient / Ruiz Manley regarding differences between Advance Directives and portable DNR orders.     Length of ACP Conversation in minutes:      Conversation Outcomes:  [x] ACP discussion completed  [] Existing advance directive reviewed with patient; no changes to patient's previously recorded wishes  [] New Advance Directive completed  [] Portable Do Not Rescitate prepared for Provider review and signature  [] POLST/POST/MOLST/MOST prepared for Provider review and signature      Follow-up plan:    [] Schedule follow-up conversation to continue planning  [] Referred individual to Provider for additional questions/concerns   [] Advised patient/agent/surrogate to review completed ACP document and update if needed with changes in condition, patient preferences or care setting    [] This note routed to one or more involved healthcare providers    Electronically signed by Karla May RN on 6/30/21 at 9:44 AM EDT

## 2021-06-30 NOTE — PLAN OF CARE
Problem: Falls - Risk of:  Goal: Will remain free from falls  Description: Will remain free from falls  Outcome: Ongoing     Problem: Skin Integrity:  Goal: Absence of new skin breakdown  Description: Absence of new skin breakdown  Outcome: Ongoing     Problem: Serum Glucose Level - Abnormal:  Goal: Ability to maintain appropriate glucose levels has stabilized  Description: Ability to maintain appropriate glucose levels has stabilized  Outcome: Ongoing

## 2021-06-30 NOTE — CARE COORDINATION
06/30/21 0919   IMM Letter   IMM Letter given to Patient/Family/Significant other/Guardian/POA/by: Heriberto Giordano RN   IMM Letter date given: 06/30/21   IMM Letter time given: 2219     Electronically signed by Heriberto Giordano RN on 6/30/21 at 9:19 AM EDT

## 2021-06-30 NOTE — PROGRESS NOTES
Hospitalist Progress Note      PCP: Iain Jordan MD    Date of Admission: 6/25/2021    Chief Complaint: Bilateral lower extremity edema, shortness of breath    Hospital Course: 25-year-old male with past medical history of hypertension, diabetes mellitus, hypothyroidism, history of peripheral arterial disease with right toe amputation, left TMA presented to the hospital with complains of bilateral leg swelling and shortness of breath. Noted to be in fluid overload    6/27: Had hypoglycemia episode overnight. Per family, he rarely takes more than 5U prandial insulin even though he is prescribed 10U. Was placed on D10 and blood glucose improved overnight. Also had worsening hypoxia and CXR showed atelectasis vs pneumonia and he was started on cefepime and Flagyl. He feels better today than yesterday. Weaned down to 1L. Subjective: Pt seen and examined. Feels ok, clinically improved.     Medications:  Reviewed    Infusion Medications    sodium bicarbonate infusion 75 mL/hr at 06/30/21 0359    sodium chloride      dextrose       Scheduled Medications    hydrALAZINE  75 mg Oral 3 times per day    ceFAZolin  2,000 mg Intravenous Q12H    [Held by provider] atenolol  25 mg Oral Daily    metroNIDAZOLE  500 mg Intravenous Q8H    NIFEdipine  30 mg Oral Daily    atorvastatin  40 mg Oral Daily    clopidogrel  75 mg Oral Daily    levothyroxine  25 mcg Oral QAM AC    sodium chloride flush  5-40 mL Intravenous 2 times per day    enoxaparin  30 mg Subcutaneous Daily    insulin lispro  0-12 Units Subcutaneous TID WC    insulin lispro  0-6 Units Subcutaneous Nightly     PRN Meds: melatonin, docusate sodium, sodium chloride flush, sodium chloride, ondansetron **OR** ondansetron, polyethylene glycol, acetaminophen **OR** acetaminophen, perflutren lipid microspheres, hydrALAZINE, glucose, dextrose, glucagon (rDNA), dextrose      Intake/Output Summary (Last 24 hours) at 6/30/2021 1821  Last data filed at 6/30/2021 1348  Gross per 24 hour   Intake 600 ml   Output 375 ml   Net 225 ml       Physical Exam Performed:    BP (!) 165/68   Pulse 66   Temp 97.8 °F (36.6 °C) (Oral)   Resp 18   Ht 5' 4\" (1.626 m)   Wt 158 lb 15.2 oz (72.1 kg)   SpO2 95%   BMI 27.28 kg/m²     General appearance: No apparent distress, appears stated age and cooperative. HEENT: Pupils equal, round, and reactive to light. Conjunctivae/corneas clear. Neck: Supple, with full range of motion. No jugular venous distention. Trachea midline. Respiratory:  Normal respiratory effort. Bibasilar crackles. Cardiovascular: Regular rate and rhythm with normal S1/S2 without murmurs, rubs or gallops. Abdomen: Soft, non-tender, non-distended with normal bowel sounds. Musculoskeletal: No bilateral lower extremity edema improving. Amputations noted right and left feet. Skin: Skin color, texture, turgor normal.  No rashes or lesions. Neurologic:  Neurovascularly intact without any focal sensory/motor deficits. Cranial nerves: II-XII intact, grossly non-focal.  Psychiatric: Alert and oriented, thought content appropriate, normal insight  Capillary Refill: Brisk,3 seconds, normal   Peripheral Pulses: +2 palpable, equal bilaterally       Labs:   Recent Labs     06/28/21  0721 06/29/21  0744 06/30/21  0624   WBC 7.5 6.9 8.6   HGB 12.0* 11.4* 11.0*   HCT 33.2* 33.2* 32.4*    183 183     Recent Labs     06/28/21  0721 06/29/21  0744 06/30/21  0624    138 140   K 4.0 4.4 4.1    100 100   CO2 23 19* 22   BUN 59* 64* 66*   CREATININE 3.7* 3.9* 4.2*   CALCIUM 8.3 8.5 8.2*   PHOS 5.1* 5.4* 5.6*     No results for input(s): AST, ALT, BILIDIR, BILITOT, ALKPHOS in the last 72 hours. No results for input(s): INR in the last 72 hours. No results for input(s): Patricia Lowers in the last 72 hours.     Urinalysis:      Lab Results   Component Value Date    NITRU Negative 06/26/2021    WBCUA 0-2 06/26/2021    BACTERIA Rare 08/05/2011    RBCUA 0-2 06/26/2021 BLOODU Negative 06/26/2021    SPECGRAV 1.010 06/26/2021    GLUCOSEU 100 06/26/2021    GLUCOSEU Negative 12/20/2011       Radiology:  Fluoroscopy modified barium swallow with video   Final Result   No aspiration noted      Please see separate speech pathology report for full discussion of findings   and recommendations. CT CHEST WO CONTRAST   Final Result   Bilateral airspace disease primarily right lower lobe with pleural effusion   most likely multifocal pneumonia. The gallbladder is almost entirely empty but does show calcified gallstones. XR CHEST PORTABLE   Final Result   Right basilar atelectasis, aspiration or pneumonia. XR CHEST PORTABLE   Final Result   Subtle patchy opacities noted at the right lung base and left upper lung   which may reflect multifocal airspace disease.                  Assessment/Plan:    Active Hospital Problems    Diagnosis     Fluid overload [E87.70]         Bilateral lower extremity swelling with concern for fluid overload  Chronic diastolic heart failure  MSSA Pneumonia  -Stopped diuresis and giving back some fluid   -Echo with EF 19-25%, grade 1 diastolic dysfunction  -Nephrology following  -Strict input and output  --change Abx from cefepime and Flagyl to IV Ancef based on respiratory culture sensitivities    Acute respiratory failure with hypoxia - likely 2/2 fluid overload and/or pneumonia  -continue diuresis as above  -wean oxygen to maintain SpO2 > 89%  -trend procalcitonin  -Strep pneumo, Legionella urine antigen negative  -respiratory culture pend  -continue empiric cefepime/Flagyl  -CT Chest without contrast showed evidence of multifocal pneumonia     Hypertension  -Continue nifedipine, hydralazine  --hold BB with bradycardia     MELVIN on CKD stage IV - worsening  -Continue monitor creatinine, slightly worsening  --nephrology following  --stopped diuresis and giving back IVF now     Diabetes mellitus  -stopped D10  -held scheduled Humalog and Lantus  -continue SSI  -hypoglycemia protocol  -POCT glucose checks  -carb control diet     Peripheral arterial disease  -Status post right second and third toe amputation, left TMA  -Continue Plavix, Lipitor     Hypothyroidism  -Continue Synthroid    DVT Prophylaxis: Lovenox  Diet: ADULT DIET; Regular; 3 carb choices (45 gm/meal);  Low Sodium (2 gm); 1500 ml  Code Status: Full Code    Dispo -continue to treat pneumonia, await improvement in kidney function    Discussed with Dr. Sean Urbina MD

## 2021-06-30 NOTE — PROGRESS NOTES
Pt initially treated for suspected HF exacerbation. HF RN consult received as part of HF order set from Dr Zandra Maher. Pt was treated with IV bolus Lasix but developed MELVIN. He has been getting gentle IVFs after diuretics stopped. CT 6/28 showed PNA. Perfect Serve to Dr Tillie Frankel confirmed he is calling cause of sx PNA now. HF RN consult discontinued. Pt does carry a history of diastolic HF and follows with [facility]. He was last seen in office on 3/1/21 by his primary cardiologist, Dr Althia Holter, who noted LE edema and a recent increase in Torsemide dose by nephrology. Notes states \"edema multifactorial\" and decreased amlodipine dose as thought it was contributing. Pt was also changed from corgard to tenormin at that visit d/t cost concerns. Pt was instructed to finish up remaining corgard he had at home and was given a script for Tenormin 25 mg PO QD. HF measures have been added: daily weights, I/O, and sodium / fluid restricted diet. HF careplan is current. HF instructions have been added to AVS.  A follow up has been arranged for [facility] 7/8 with PCP, Dr Mansoor Ewing, at the [facility] office. HF RNs will continue to follow peripherally.

## 2021-06-30 NOTE — PROGRESS NOTES
Department of Internal Medicine  Nephrology Progress Note      HPI: Mr Fredy Canales is an 80 yo male with a past medical history significant for DM II, hypertension, peripheral vascular disease, chronic kidney disease stage  IV, follows up with Dr. Santiago Nieto (baseline creatinine in 2.9-3.2 mg/dl range recently), brought to ER by the family because of uncontrolled diabetes and blood pressure with worsening lower extremity edema and some shortness of breath and dyspnea on exertion. At the time  of consultation, the patient was found to be in MELVIN    SUBJECTIVE:  Resting in recliner; breathing and edema according to family members has improved     Family at bedside     Physical Exam:    VITALS:  BP (!) 191/68   Pulse 67   Temp 97.6 °F (36.4 °C) (Oral)   Resp 18   Ht 5' 4\" (1.626 m)   Wt 158 lb 15.2 oz (72.1 kg)   SpO2 97%   BMI 27.28 kg/m²   24HR INTAKE/OUTPUT:      Intake/Output Summary (Last 24 hours) at 6/30/2021 1214  Last data filed at 6/30/2021 1010  Gross per 24 hour   Intake 300 ml   Output 325 ml   Net -25 ml       Constitutional:  Alert,awake  Respiratory:  Decrease BS bilaterally but clear to auscultation  Gastrointestinal:  No  tenderness.   Normal Bowel Sounds  Cardiovascular:  RRR   Edema:  +  edema     [Held by provider] atenolol  25 mg Oral Daily    metroNIDAZOLE  500 mg Intravenous Q8H    cefepime  1,000 mg Intravenous Q24H    NIFEdipine  30 mg Oral Daily    hydrALAZINE  50 mg Oral 3 times per day    atorvastatin  40 mg Oral Daily    clopidogrel  75 mg Oral Daily    levothyroxine  25 mcg Oral QAM AC    sodium chloride flush  5-40 mL Intravenous 2 times per day    enoxaparin  30 mg Subcutaneous Daily    insulin lispro  0-12 Units Subcutaneous TID WC    insulin lispro  0-6 Units Subcutaneous Nightly       DATA:    CBC:  Lab Results   Component Value Date    WBC 8.6 06/30/2021    RBC 3.67 06/30/2021    HGB 11.0 06/30/2021    HCT 32.4 06/30/2021    MCV 88.1 06/30/2021    MCH 30.0 06/30/2021    MCHC 34.1 06/30/2021    RDW 15.1 06/30/2021     06/30/2021    MPV 8.8 06/30/2021     CMP:  Lab Results   Component Value Date     06/30/2021    K 4.1 06/30/2021    K 4.6 06/25/2021     06/30/2021    CO2 22 06/30/2021    BUN 66 06/30/2021    CREATININE 4.2 06/30/2021    GFRAA 16 06/30/2021    GFRAA >60 05/24/2013    AGRATIO 0.8 06/25/2021    LABGLOM 14 06/30/2021    GLUCOSE 107 06/30/2021    PROT 6.3 06/27/2021    PROT 7.0 10/05/2012    CALCIUM 8.2 06/30/2021    BILITOT <0.2 06/25/2021    ALKPHOS 113 06/25/2021    AST 11 06/25/2021    ALT 8 06/25/2021      Hepatic Function Panel:   Lab Results   Component Value Date    ALKPHOS 113 06/25/2021    ALT 8 06/25/2021    AST 11 06/25/2021    PROT 6.3 06/27/2021    PROT 7.0 10/05/2012    BILITOT <0.2 06/25/2021    BILIDIR <0.2 01/17/2020    IBILI see below 01/17/2020      Phosphorus:   Lab Results   Component Value Date    PHOS 5.6 06/30/2021       ASSESSMENT:  Active Problems:    Fluid overload  Resolved Problems:    * No resolved hospital problems. *      PLAN:  1. MELVIN - creatinine continues to rise - CT scan revealed primarily multifocal pneumonia - stop diuresis and continue IVF - no immediate indication for RRT but may be necessary if kidney function continues to decline. This has been discussed at length with patient and son today. 2. CHF - preserved EF on Echo - stop diuretics given findings on CT scan   3. HTN - BP elevated - Increase Hydralazine dose - avoid Hypotension  4. Anemia CKD - Hgb > 11. JM not indicated at this point in time  5. 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 DM2 uncontrolled - plan per Medicine  8. Pneumonia on abx   9.  Plan dw with family in detail     Kendal Anderson MD,

## 2021-07-01 ENCOUNTER — TELEPHONE (OUTPATIENT)
Dept: FAMILY MEDICINE CLINIC | Age: 86
End: 2021-07-01

## 2021-07-01 VITALS
RESPIRATION RATE: 16 BRPM | SYSTOLIC BLOOD PRESSURE: 185 MMHG | TEMPERATURE: 98.1 F | HEART RATE: 64 BPM | HEIGHT: 64 IN | OXYGEN SATURATION: 97 % | DIASTOLIC BLOOD PRESSURE: 70 MMHG | BODY MASS INDEX: 27.55 KG/M2 | WEIGHT: 161.38 LBS

## 2021-07-01 LAB
ALBUMIN SERPL-MCNC: 3.1 G/DL (ref 3.4–5)
ANION GAP SERPL CALCULATED.3IONS-SCNC: 18 MMOL/L (ref 3–16)
BASOPHILS ABSOLUTE: 0.1 K/UL (ref 0–0.2)
BASOPHILS RELATIVE PERCENT: 1.3 %
BUN BLDV-MCNC: 64 MG/DL (ref 7–20)
CALCIUM SERPL-MCNC: 8.5 MG/DL (ref 8.3–10.6)
CHLORIDE BLD-SCNC: 97 MMOL/L (ref 99–110)
CO2: 22 MMOL/L (ref 21–32)
CREAT SERPL-MCNC: 4.1 MG/DL (ref 0.8–1.3)
EOSINOPHILS ABSOLUTE: 0.2 K/UL (ref 0–0.6)
EOSINOPHILS RELATIVE PERCENT: 2.5 %
GFR AFRICAN AMERICAN: 17
GFR NON-AFRICAN AMERICAN: 14
GLUCOSE BLD-MCNC: 132 MG/DL (ref 70–99)
GLUCOSE BLD-MCNC: 133 MG/DL (ref 70–99)
GLUCOSE BLD-MCNC: 158 MG/DL (ref 70–99)
GLUCOSE BLD-MCNC: 176 MG/DL (ref 70–99)
GLUCOSE BLD-MCNC: 190 MG/DL (ref 70–99)
HCT VFR BLD CALC: 31.9 % (ref 40.5–52.5)
HEMOGLOBIN: 11.3 G/DL (ref 13.5–17.5)
LYMPHOCYTES ABSOLUTE: 1.2 K/UL (ref 1–5.1)
LYMPHOCYTES RELATIVE PERCENT: 16.9 %
MCH RBC QN AUTO: 33.2 PG (ref 26–34)
MCHC RBC AUTO-ENTMCNC: 35.5 G/DL (ref 31–36)
MCV RBC AUTO: 93.4 FL (ref 80–100)
MONOCYTES ABSOLUTE: 0.7 K/UL (ref 0–1.3)
MONOCYTES RELATIVE PERCENT: 9.7 %
NEUTROPHILS ABSOLUTE: 5.1 K/UL (ref 1.7–7.7)
NEUTROPHILS RELATIVE PERCENT: 69.6 %
PDW BLD-RTO: 15.1 % (ref 12.4–15.4)
PERFORMED ON: ABNORMAL
PHOSPHORUS: 5.2 MG/DL (ref 2.5–4.9)
PLATELET # BLD: 176 K/UL (ref 135–450)
PMV BLD AUTO: 9 FL (ref 5–10.5)
POTASSIUM SERPL-SCNC: 3.9 MMOL/L (ref 3.5–5.1)
PRO-BNP: ABNORMAL PG/ML (ref 0–449)
PROCALCITONIN: 0.28 NG/ML (ref 0–0.15)
RBC # BLD: 3.41 M/UL (ref 4.2–5.9)
SODIUM BLD-SCNC: 137 MMOL/L (ref 136–145)
URIC ACID, SERUM: 7.3 MG/DL (ref 3.5–7.2)
WBC # BLD: 7.4 K/UL (ref 4–11)

## 2021-07-01 PROCEDURE — 9990000010 HC NO CHARGE VISIT

## 2021-07-01 PROCEDURE — 6370000000 HC RX 637 (ALT 250 FOR IP): Performed by: INTERNAL MEDICINE

## 2021-07-01 PROCEDURE — 84145 PROCALCITONIN (PCT): CPT

## 2021-07-01 PROCEDURE — 6360000002 HC RX W HCPCS: Performed by: INTERNAL MEDICINE

## 2021-07-01 PROCEDURE — 85025 COMPLETE CBC W/AUTO DIFF WBC: CPT

## 2021-07-01 PROCEDURE — 92526 ORAL FUNCTION THERAPY: CPT

## 2021-07-01 PROCEDURE — 2500000003 HC RX 250 WO HCPCS: Performed by: NURSE PRACTITIONER

## 2021-07-01 PROCEDURE — 36415 COLL VENOUS BLD VENIPUNCTURE: CPT

## 2021-07-01 PROCEDURE — 2700000000 HC OXYGEN THERAPY PER DAY

## 2021-07-01 PROCEDURE — 84550 ASSAY OF BLOOD/URIC ACID: CPT

## 2021-07-01 PROCEDURE — 94761 N-INVAS EAR/PLS OXIMETRY MLT: CPT

## 2021-07-01 PROCEDURE — 51702 INSERT TEMP BLADDER CATH: CPT

## 2021-07-01 PROCEDURE — 83880 ASSAY OF NATRIURETIC PEPTIDE: CPT

## 2021-07-01 PROCEDURE — 51798 US URINE CAPACITY MEASURE: CPT

## 2021-07-01 PROCEDURE — 80069 RENAL FUNCTION PANEL: CPT

## 2021-07-01 PROCEDURE — 2580000003 HC RX 258: Performed by: INTERNAL MEDICINE

## 2021-07-01 RX ORDER — LEVOFLOXACIN 5 MG/ML
500 INJECTION, SOLUTION INTRAVENOUS
Status: DISCONTINUED | OUTPATIENT
Start: 2021-07-01 | End: 2021-07-01 | Stop reason: HOSPADM

## 2021-07-01 RX ORDER — DOXAZOSIN 2 MG/1
2 TABLET ORAL DAILY
Qty: 30 TABLET | Refills: 3 | Status: SHIPPED | OUTPATIENT
Start: 2021-07-02

## 2021-07-01 RX ORDER — NIFEDIPINE 30 MG/1
30 TABLET, FILM COATED, EXTENDED RELEASE ORAL DAILY
Qty: 30 TABLET | Refills: 1 | Status: SHIPPED | OUTPATIENT
Start: 2021-07-02

## 2021-07-01 RX ORDER — HYDRALAZINE HYDROCHLORIDE 100 MG/1
100 TABLET, FILM COATED ORAL EVERY 8 HOURS SCHEDULED
Qty: 90 TABLET | Refills: 3 | Status: SHIPPED | OUTPATIENT
Start: 2021-07-01

## 2021-07-01 RX ORDER — HYDRALAZINE HYDROCHLORIDE 50 MG/1
100 TABLET, FILM COATED ORAL EVERY 8 HOURS SCHEDULED
Status: DISCONTINUED | OUTPATIENT
Start: 2021-07-01 | End: 2021-07-01 | Stop reason: HOSPADM

## 2021-07-01 RX ORDER — SODIUM BICARBONATE 650 MG/1
650 TABLET ORAL 3 TIMES DAILY
Status: DISCONTINUED | OUTPATIENT
Start: 2021-07-01 | End: 2021-07-01 | Stop reason: HOSPADM

## 2021-07-01 RX ORDER — METRONIDAZOLE 500 MG/1
500 TABLET ORAL 3 TIMES DAILY
Qty: 30 TABLET | Refills: 0 | Status: SHIPPED | OUTPATIENT
Start: 2021-07-01 | End: 2021-07-11

## 2021-07-01 RX ORDER — LEVOFLOXACIN 500 MG/1
500 TABLET, FILM COATED ORAL EVERY OTHER DAY
Qty: 5 TABLET | Refills: 0 | Status: SHIPPED | OUTPATIENT
Start: 2021-07-01 | End: 2021-07-11

## 2021-07-01 RX ORDER — DOXAZOSIN 2 MG/1
2 TABLET ORAL DAILY
Status: DISCONTINUED | OUTPATIENT
Start: 2021-07-01 | End: 2021-07-01 | Stop reason: HOSPADM

## 2021-07-01 RX ORDER — SODIUM BICARBONATE 650 MG/1
650 TABLET ORAL 3 TIMES DAILY
Qty: 90 TABLET | Refills: 1 | Status: SHIPPED | OUTPATIENT
Start: 2021-07-01

## 2021-07-01 RX ADMIN — HYDRALAZINE HYDROCHLORIDE 75 MG: 50 TABLET, FILM COATED ORAL at 06:11

## 2021-07-01 RX ADMIN — ATORVASTATIN CALCIUM 40 MG: 40 TABLET, FILM COATED ORAL at 09:41

## 2021-07-01 RX ADMIN — NIFEDIPINE 30 MG: 30 TABLET, FILM COATED, EXTENDED RELEASE ORAL at 09:41

## 2021-07-01 RX ADMIN — CEFAZOLIN SODIUM 2000 MG: 10 INJECTION, POWDER, FOR SOLUTION INTRAVENOUS at 06:11

## 2021-07-01 RX ADMIN — LEVOFLOXACIN 500 MG: 5 INJECTION, SOLUTION INTRAVENOUS at 13:30

## 2021-07-01 RX ADMIN — ENOXAPARIN SODIUM 30 MG: 30 INJECTION SUBCUTANEOUS at 09:41

## 2021-07-01 RX ADMIN — DOXAZOSIN 2 MG: 2 TABLET ORAL at 13:31

## 2021-07-01 RX ADMIN — CLOPIDOGREL BISULFATE 75 MG: 75 TABLET ORAL at 09:41

## 2021-07-01 RX ADMIN — METRONIDAZOLE 500 MG: 500 INJECTION, SOLUTION INTRAVENOUS at 09:41

## 2021-07-01 RX ADMIN — METRONIDAZOLE 500 MG: 500 INJECTION, SOLUTION INTRAVENOUS at 01:35

## 2021-07-01 RX ADMIN — LEVOTHYROXINE SODIUM 25 MCG: 0.03 TABLET ORAL at 06:12

## 2021-07-01 RX ADMIN — SODIUM BICARBONATE 650 MG: 650 TABLET ORAL at 13:31

## 2021-07-01 RX ADMIN — HYDRALAZINE HYDROCHLORIDE 10 MG: 20 INJECTION INTRAMUSCULAR; INTRAVENOUS at 13:44

## 2021-07-01 RX ADMIN — HYDRALAZINE HYDROCHLORIDE 100 MG: 50 TABLET, FILM COATED ORAL at 13:31

## 2021-07-01 RX ADMIN — INSULIN LISPRO 2 UNITS: 100 INJECTION, SOLUTION INTRAVENOUS; SUBCUTANEOUS at 13:36

## 2021-07-01 ASSESSMENT — PAIN SCALES - GENERAL
PAINLEVEL_OUTOF10: 0
PAINLEVEL_OUTOF10: 0

## 2021-07-01 NOTE — PROGRESS NOTES
Department of Internal Medicine  Nephrology Progress Note      HPI: Mr Eliot James is an 80 yo male with a past medical history significant for DM II, hypertension, peripheral vascular disease, chronic kidney disease stage  IV, follows up with Dr. Haile Garcia (baseline creatinine in 2.9-3.2 mg/dl range recently), brought to ER by the family because of uncontrolled diabetes and blood pressure with worsening lower extremity edema and some shortness of breath and dyspnea on exertion. At the time  of consultation, the patient was found to be in MELVIN    SUBJECTIVE:  Resting in bed; breathing and edema according to family members has improved. He's in a worse state of mind today and seems drained both physically and mentally     Family at bedside     Physical Exam:    VITALS:  BP (!) 193/82   Pulse 78   Temp 98.1 °F (36.7 °C) (Axillary)   Resp 16   Ht 5' 4\" (1.626 m)   Wt 161 lb 6 oz (73.2 kg)   SpO2 93%   BMI 27.70 kg/m²   24HR INTAKE/OUTPUT:      Intake/Output Summary (Last 24 hours) at 7/1/2021 1127  Last data filed at 7/1/2021 8310  Gross per 24 hour   Intake 1370 ml   Output 975 ml   Net 395 ml       Constitutional:  Awake  Respiratory:  Decrease BS bilaterally but clear to auscultation  Gastrointestinal:  No  tenderness.   Normal Bowel Sounds  Cardiovascular:  RRR   Edema:  +  edema     levofloxacin  500 mg Intravenous Q48H    sodium bicarbonate  650 mg Oral TID    hydrALAZINE  100 mg Oral 3 times per day    doxazosin  2 mg Oral Daily    [Held by provider] atenolol  25 mg Oral Daily    metroNIDAZOLE  500 mg Intravenous Q8H    NIFEdipine  30 mg Oral Daily    atorvastatin  40 mg Oral Daily    clopidogrel  75 mg Oral Daily    levothyroxine  25 mcg Oral QAM AC    sodium chloride flush  5-40 mL Intravenous 2 times per day    enoxaparin  30 mg Subcutaneous Daily    insulin lispro  0-12 Units Subcutaneous TID WC    insulin lispro  0-6 Units Subcutaneous Nightly       DATA:    CBC:  Lab Results   Component Value Date WBC 7.4 07/01/2021    RBC 3.41 07/01/2021    HGB 11.3 07/01/2021    HCT 31.9 07/01/2021    MCV 93.4 07/01/2021    MCH 33.2 07/01/2021    MCHC 35.5 07/01/2021    RDW 15.1 07/01/2021     07/01/2021    MPV 9.0 07/01/2021     CMP:  Lab Results   Component Value Date     07/01/2021    K 3.9 07/01/2021    K 4.6 06/25/2021    CL 97 07/01/2021    CO2 22 07/01/2021    BUN 64 07/01/2021    CREATININE 4.1 07/01/2021    GFRAA 17 07/01/2021    GFRAA >60 05/24/2013    AGRATIO 0.8 06/25/2021    LABGLOM 14 07/01/2021    GLUCOSE 133 07/01/2021    PROT 6.3 06/27/2021    PROT 7.0 10/05/2012    CALCIUM 8.5 07/01/2021    BILITOT <0.2 06/25/2021    ALKPHOS 113 06/25/2021    AST 11 06/25/2021    ALT 8 06/25/2021      Hepatic Function Panel:   Lab Results   Component Value Date    ALKPHOS 113 06/25/2021    ALT 8 06/25/2021    AST 11 06/25/2021    PROT 6.3 06/27/2021    PROT 7.0 10/05/2012    BILITOT <0.2 06/25/2021    BILIDIR <0.2 01/17/2020    IBILI see below 01/17/2020      Phosphorus:   Lab Results   Component Value Date    PHOS 5.2 07/01/2021       ASSESSMENT:  Active Problems:    Fluid overload  Resolved Problems:    * No resolved hospital problems. *      PLAN:  1. MELVIN - creatinine appears to have reached a plateau after Lasix was stopped and patient received 2 days of IVF - will stop IVF. Started on sodium bicarbonate tablets. MELVIN occurred in setting of pneumonia (CT scan revealed primarily multifocal pneumonia). Patient appears overall to be decompensating - Had a long discussion with [facility] (son in law) yesterday and the entire family today. Patient is not an ideal long term dialysis candidate given age and comorbidities. So if kidney function declined further we would not offer HD. Family understands and agrees with this.  Fortunately kidney function has stabilized - My recommendation would be to send patient home with home hospice and continue to manage his comorbidities keeping in mind that our objective at this point in time is to keep him as comfortable as possible in a home setting (with home hospice) with his family around him. Family agrees with this plan. Discussed with Dr Katy Gauthier   2. CHF - preserved EF on Echo - stop diuretics given findings on CT scan   3. HTN - BP elevated - Increase Hydralazine dose and added Doxazosin (may help with urination as well) - avoid Hypotension  4. Anemia CKD - Hgb > 11. JM not indicated at this point in time   5. 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 DM2 uncontrolled - plan per Medicine  8. Pneumonia on abx   9.  Plan dw with family in detail     Kendal Anderson MD,

## 2021-07-01 NOTE — PROGRESS NOTES
dextrose, glucagon (rDNA), dextrose      Intake/Output Summary (Last 24 hours) at 7/1/2021 1310  Last data filed at 7/1/2021 3909  Gross per 24 hour   Intake 1370 ml   Output 975 ml   Net 395 ml       Physical Exam Performed:    BP (!) 193/82   Pulse 78   Temp 98.1 °F (36.7 °C) (Axillary)   Resp 16   Ht 5' 4\" (1.626 m)   Wt 161 lb 6 oz (73.2 kg)   SpO2 93%   BMI 27.70 kg/m²     General appearance: No apparent distress, appears stated age and cooperative. HEENT: Pupils equal, round, and reactive to light. Conjunctivae/corneas clear. Neck: Supple, with full range of motion. No jugular venous distention. Trachea midline. Respiratory:  Normal respiratory effort. Bibasilar crackles. Cardiovascular: Regular rate and rhythm with normal S1/S2 without murmurs, rubs or gallops. Abdomen: Soft, non-tender, non-distended with normal bowel sounds. Musculoskeletal: No bilateral lower extremity edema improving. Amputations noted right and left feet. Skin: Skin color, texture, turgor normal.  No rashes or lesions. Neurologic:  Neurovascularly intact without any focal sensory/motor deficits. Cranial nerves: II-XII intact, grossly non-focal.  Psychiatric: Alert and oriented, thought content appropriate, normal insight  Capillary Refill: Brisk,3 seconds, normal   Peripheral Pulses: +2 palpable, equal bilaterally       Labs:   Recent Labs     06/29/21  0744 06/30/21  0624 07/01/21  0648   WBC 6.9 8.6 7.4   HGB 11.4* 11.0* 11.3*   HCT 33.2* 32.4* 31.9*    183 176     Recent Labs     06/29/21  0744 06/30/21  0624 07/01/21  0648    140 137   K 4.4 4.1 3.9    100 97*   CO2 19* 22 22   BUN 64* 66* 64*   CREATININE 3.9* 4.2* 4.1*   CALCIUM 8.5 8.2* 8.5   PHOS 5.4* 5.6* 5.2*     No results for input(s): AST, ALT, BILIDIR, BILITOT, ALKPHOS in the last 72 hours. No results for input(s): INR in the last 72 hours. No results for input(s): Zettie Binet in the last 72 hours.     Urinalysis:      Lab Results IVF now       Diabetes mellitus  -held scheduled Humalog and Lantus  -continue SSI  -hypoglycemia protocol  -POCT glucose checks  -carb control diet         Peripheral arterial disease  -Status post right second and third toe amputation, left TMA  -Continue Plavix, Lipitor         Hypothyroidism  -Continue Synthroid        DVT Prophylaxis: Lovenox  Diet: ADULT DIET; Regular; 3 carb choices (45 gm/meal); Low Sodium (2 gm); 1500 ml  Code Status: DNR-CC        Dispo - nephrology had a long discussion with family and pt. He wants to be at home with his wife - she has advanced dementia. No plan for dialysis. Hospice to eval today. Family hoping to take him home with hospice palliative care today.        Deven Ríos MD

## 2021-07-01 NOTE — FLOWSHEET NOTE
Family in room at bedside. Pt is tearful and does not look well. PT came to work with pt and reports working with him two days ago and pt not the same way the he was two days ago. Pt is more lethargic. Family reports he is refusing to eat and drink. Pt also refused therapy. Dr Brenda Arias made aware via secure message. Awaiting response. Electronically signed by Georgina Bowie RN on 7/1/2021 at 9:29 AM     Dr Marella Lundborg covering for Dr Brenda Arias. Message relayed. MD at bedside and speaking with family.      Electronically signed by Georgina Bowie RN on 7/1/2021 at 10:02 AM

## 2021-07-01 NOTE — FLOWSHEET NOTE
ELMER notified this RN pt is to d/c home with bentley catheter.      Electronically signed by Alen Bender RN on 7/1/2021 at 4:24 PM

## 2021-07-01 NOTE — FLOWSHEET NOTE
Pt /69. PRN Hydralazine given.  [facility] aware of HTN. Will continue to monitor.

## 2021-07-01 NOTE — PROGRESS NOTES
Occupational Therapy  Therapy attempted. Pt in bed, son present. Son indicating concern with pt's medical status, and wanting to speak with nephrologist. Pt emotionally labile, and refused therapy. RN made aware. Will continue to follow. Refer to the last note for status if pt is discharged.   Pascual GEORGE/MARGARETTE,[phone]

## 2021-07-01 NOTE — FLOWSHEET NOTE
Bladder scan 385 mL. Dr Nena Isabel ordered insertion of bentley catheter via face to face.      Electronically signed by Suellen Beckett RN on 7/1/2021 at 10:03 AM

## 2021-07-01 NOTE — PLAN OF CARE
Problem: Falls - Risk of:  Goal: Will remain free from falls  Description: Will remain free from falls  7/1/2021 0759 by Alen Bender RN  Outcome: Ongoing     Problem: Falls - Risk of:  Goal: Absence of physical injury  Description: Absence of physical injury  7/1/2021 0759 by Alen Bender RN  Outcome: Ongoing     Problem: Skin Integrity:  Goal: Will show no infection signs and symptoms  Description: Will show no infection signs and symptoms  7/1/2021 0759 by Alen Bender RN  Outcome: Ongoing     Problem: Skin Integrity:  Goal: Absence of new skin breakdown  Description: Absence of new skin breakdown  7/1/2021 0759 by Alen Bender RN  Outcome: Ongoing     Problem: Pain:  Goal: Pain level will decrease  Description: Pain level will decrease  7/1/2021 0759 by Alen Bender RN  Outcome: Ongoing     Problem: Pain:  Goal: Control of acute pain  Description: Control of acute pain  7/1/2021 0759 by Alen Bender RN  Outcome: Ongoing     Problem: Pain:  Goal: Control of chronic pain  Description: Control of chronic pain  7/1/2021 0759 by Alen Bender RN  Outcome: Ongoing     Problem: OXYGENATION/RESPIRATORY FUNCTION  Goal: Patient will maintain patent airway  7/1/2021 0759 by Alen Bender RN  Outcome: Ongoing     Problem: OXYGENATION/RESPIRATORY FUNCTION  Goal: Patient will achieve/maintain normal respiratory rate/effort  Description: Respiratory rate and effort will be within normal limits for the patient  7/1/2021 0759 by Alen Bender RN  Outcome: Ongoing     Problem: HEMODYNAMIC STATUS  Goal: Patient has stable vital signs and fluid balance  7/1/2021 0759 by Alen Bender RN  Outcome: Ongoing     Problem: FLUID AND ELECTROLYTE IMBALANCE  Goal: Fluid and electrolyte balance are achieved/maintained  7/1/2021 0759 by Alen Bender RN  Outcome: Ongoing     Problem: ACTIVITY INTOLERANCE/IMPAIRED MOBILITY  Goal: Mobility/activity is maintained at optimum level for patient  7/1/2021 0732 by Gray Carias RN  Outcome: Ongoing     Problem: Serum Glucose Level - Abnormal:  Goal: Ability to maintain appropriate glucose levels has stabilized  Description: Ability to maintain appropriate glucose levels has stabilized  7/1/2021 0759 by Gray Carias RN  Outcome: Ongoing

## 2021-07-01 NOTE — PLAN OF CARE
Problem: Falls - Risk of:  Goal: Will remain free from falls  Description: Will remain free from falls  Outcome: Ongoing  Goal: Absence of physical injury  Description: Absence of physical injury  Outcome: Ongoing     Problem: Skin Integrity:  Goal: Will show no infection signs and symptoms  Description: Will show no infection signs and symptoms  Outcome: Ongoing  Goal: Absence of new skin breakdown  Description: Absence of new skin breakdown  Outcome: Ongoing     Problem: Pain:  Goal: Pain level will decrease  Description: Pain level will decrease  Outcome: Ongoing  Goal: Control of acute pain  Description: Control of acute pain  Outcome: Ongoing  Goal: Control of chronic pain  Description: Control of chronic pain  Outcome: Ongoing     Problem: OXYGENATION/RESPIRATORY FUNCTION  Goal: Patient will maintain patent airway  Outcome: Ongoing  Goal: Patient will achieve/maintain normal respiratory rate/effort  Description: Respiratory rate and effort will be within normal limits for the patient  Outcome: Ongoing     Problem: HEMODYNAMIC STATUS  Goal: Patient has stable vital signs and fluid balance  Outcome: Ongoing     Problem: FLUID AND ELECTROLYTE IMBALANCE  Goal: Fluid and electrolyte balance are achieved/maintained  Outcome: Ongoing     Problem: ACTIVITY INTOLERANCE/IMPAIRED MOBILITY  Goal: Mobility/activity is maintained at optimum level for patient  Outcome: Ongoing     Problem: Serum Glucose Level - Abnormal:  Goal: Ability to maintain appropriate glucose levels has stabilized  Description: Ability to maintain appropriate glucose levels has stabilized  Outcome: Ongoing

## 2021-07-01 NOTE — FLOWSHEET NOTE
Placed 18 Fr bentley catheter with 10 mL inflated baloon per orders. No complications. Pt tolerated well.

## 2021-07-01 NOTE — DISCHARGE INSTR - COC
Continuity of Care Form    Patient Name: Jordy Radford   :  1935  MRN:  [id number]    Admit date:  2021  Discharge date:  2021    Code Status Order: [facility]   Advance Directives:   [address] [facility] Documentation     Date/Time Healthcare Directive Type of Healthcare Directive Copy in [address] Agent's Name Healthcare Agent's Phone Number    21 2044  No, patient does not have an advance directive for healthcare treatment -- -- -- -- --          Admitting Physician:  Azeem Verdugo MD  PCP: Newton Da Silva MD    Discharging Nurse: [facility] Unit/Room#: Z7S-0559/5934-05  Discharging Unit Phone Number: [phone]    Emergency Contact:   Extended Emergency Contact Information  Primary Emergency Contact: Flory Sánchez [address] Phone: [phone]  Mobile Phone: [phone]  Relation: Child    Past Surgical History:  Past Surgical History:   Procedure Laterality Date    CARDIAC CATHETERIZATION      COLONOSCOPY  11    polyp, dr Summer Ly, check in 5 years    COLONOSCOPY  2017    polyp dr Summer Ly. removed.  no further checks    FOOT AMPUTATION  13     left transmetatarsal amputation    HERNIA REPAIR      inguinal    NV AMPUTATION TOE,MT-P JT  Right 2nd and 3rd toe    Amputation, Toe    SKIN SPLIT GRAFT  13    left thigh    TOE AMPUTATION  2013    [address] [place] [address]    TOE AMPUTATION  13    right 2nd toe amp       Immunization History:   Immunization History   Administered Date(s) Administered    COVID-19, Moderna, PF, 100mcg/0.5mL 2021, 2021    Influenza 2013    Influenza, High Dose (Fluzone 65 yrs and older) 10/13/2014, 10/01/2015, 2016, 2017, 2019    Influenza, Quadv, adjuvanted, 65 yrs +, IM, PF (Fluad) 10/20/2020    Influenza, Triv, inactivated, subunit, adjuvanted, IM (Fluad 65 yrs and older) 2019    Pneumococcal Conjugate 13-valent (Antonio Crowder) 2015  Pneumococcal Polysaccharide ([id number]) 11/12/2009    Tdap (Boostrix, Adacel) 09/05/2017       Active Problems:  Patient Active Problem List   Diagnosis Code    Mixed hyperlipidemia E78.2    Essential hypertension, benign I10    Type 2 diabetes mellitus with renal manifestations (HCC) E11.29    Prostate cancer ([facility] C61    Osteoarthritis of left knee M17.12    [facility] of foot ([facility] L97.509    Carotid artery stenosis I65.29    Atherosclerosis of leg with intermittent claudication (HCC) I70.219    Gangrene of toe (HCC) I96    Atherosclerosis of native arteries of the extremities with ulceration ([facility]) I70.25    Osteomyelitis of foot, left, acute ([facility]) M86.172    Callus L84    Atherosclerosis of native artery of both lower extremities with intermittent claudication ([facility]) I70.213    Acquired hypothyroidism E03.9    Leg swelling M79.89    Fluid overload E87.70       Isolation/Infection:   Isolation          No Isolation        Patient Infection Status     None to display          Nurse Assessment:  Last Vital Signs: BP (!) 142/59   Pulse 68   Temp 98.1 °F (36.7 °C) (Oral)   Resp 16   Ht 5' 4\" (1.626 m)   Wt 161 lb 6 oz (73.2 kg)   SpO2 97%   BMI 27.70 kg/m²     Last documented pain score (0-10 scale): Pain Level: 0  Last Weight:   Wt Readings from Last 1 Encounters:   07/01/21 161 lb 6 oz (73.2 kg)     Mental Status:  disoriented and alert    IV Access:  - None    Nursing Mobility/ADLs:  Walking   Dependent  Transfer  Dependent  Bathing  Dependent  Dressing  Dependent  Toileting  Dependent  Feeding  Assisted  Med Admin  Assisted  Med Delivery   whole    Wound Care Documentation and Therapy:  Wound 07/03/13 Other (Comment) Foot Left;Distal wound 6 (Active)   Number of days: 2920       Incision 09/08/14 Foot Left (Active)   Number of days: 2488        Elimination:  Continence:   · Bowel: Yes  · Bladder: Yes  Urinary Catheter:  Insertion Date: 07/01/2021   Colostomy/Ileostomy/Ileal Conduit: No Date of Last BM: 07/01/2021    Intake/Output Summary (Last 24 hours) at 7/1/2021 1619  Last data filed at 7/1/2021 7400  Gross per 24 hour   Intake 1070 ml   Output 600 ml   Net 470 ml     I/O last 3 completed shifts: In: 2007 [P.O.:120; I.V.:850; IV Piggyback:100]  Out: 600 [Urine:600]    Safety Concerns: At Risk for Falls    Impairments/Disabilities:      None    Nutrition Therapy:  Current Nutrition Therapy:   - Oral Diet:  Carb Control 3 carbs/meal (1500kcals/day) and Low Sodium (2gm)    Routes of Feeding: Oral  Liquids: No Restrictions  Daily Fluid Restriction: yes - amount 1500 mL  Last Modified Barium Swallow with Video (Video Swallowing Test): 06/28/2021    Treatments at the Time of Hospital Discharge:   Respiratory Treatments: n/a  Oxygen Therapy:  is not on home oxygen therapy.   Ventilator:    - No ventilator support    Rehab Therapies: Physical Therapy and Occupational Therapy  Weight Bearing Status/Restrictions: No weight bearing restirctions  Other Medical Equipment (for information only, NOT a DME order): sarastedy  Other Treatments: n/a    Patient's personal belongings (please select all that are sent with patient):  with family    RN SIGNATURE:  Electronically signed by Kat Thakur RN on 7/1/21 at 4:38 PM EDT    CASE MANAGEMENT/SOCIAL WORK SECTION    Inpatient Status Date: ***    Readmission Risk Assessment Score:  Readmission Risk              Risk of Unplanned Readmission:  26           Discharging to Facility/ Agency   · Name:   · Address:  · Phone:  · Fax:    Dialysis Facility (if applicable)   · Name:  · Address:  · Dialysis Schedule:  · Phone:  · Fax:    / signature: {Esignature:[id number]}    PHYSICIAN SECTION    Prognosis: {Prognosis:0401095627}    Condition at Discharge: [address] Patient Condition:894073287}    Rehab Potential (if transferring to Rehab): {Prognosis:5595029982}    Recommended Labs or Other Treatments After Discharge: ***    Physician Certification: I certify the above information and transfer of Katrin Nettles  is necessary for the continuing treatment of the diagnosis listed and that he requires {Admit to Appropriate Level of Care:21809} for {GREATER/LESS:396021197} 30 days.      Update Admission H&P: {CHP DME Changes in NAIET:623797894}    PHYSICIAN SIGNATURE:  {Esignature:[id number]}

## 2021-07-01 NOTE — TELEPHONE ENCOUNTER
Edmund Medel is releasing patient from hospital to [facility] and Kwaku Butler from [facility] would like to know if you wanted  to remain primary care for patient. Please call.     Kwaku Butler [phone]

## 2021-07-01 NOTE — PROGRESS NOTES
[address] [facility]   Speech Therapy  Daily Dysphagia Treatment Note  DISCHARGE SUMMARY    Abiodun Perez  AGE: 81 y.o.    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)    Osteomyelitis of foot, left, acute ([address]    Callus    Atherosclerosis of native artery of both lower extremities with intermittent claudication (HCC)    Acquired hypothyroidism    Leg swelling    Fluid overload     Allergies   Allergen Reactions    Penicillins Hives     Treatment Diagnosis: Dysphagia     CHART REVIEW:  2021 admitted with c/o leg swelling: ADMISSION H&P HPI:  81year-old male with past medical history of hypertension, diabetes mellitus, hypothyroidism, history of peripheral arterial disease with right toe amputation, left TMA presented to the hospital with complains of bilateral leg swelling and shortness of breath.  Patient does not speak much and this responds with yes and no most of the time.  As per his daughter he has been getting more short of breath and worsening bilateral lower extremity edema.  Due to worsening symptoms he was brought to the hospital.  On arrival he was noted to be hypertensive blood pressure 200/100 chest x-ray was performed which showed findings concerning for multifocal airspace disease.  proBNP was noted to be elevated at 33,000.  Creatinine was 3.1.  Patient was admitted to the hospital for further work-up and management.     2021 CXR  Impression   Subtle patchy opacities noted at the right lung base and left upper lung Please get copy of her pap.     which may reflect multifocal airspace disease.      6/26/2021 late PM RAPID RESPONSE: Patient was found to be hypoglycemic with blood sugars in the 20s.  Patient was given oral glucose followed by half amp of dextrose.  After 20-30 minutes he did have improvement in his level of arousability.  Prandial insulin will be held at this time, will also hold Lantus this evening.  Patient was started on D10 at 25 an hour as he is admitted with volume overload.  Patient is now opening eyes and looking around the room.  Son reports patient is not very verbal. Omar Cook is spontaneously opening his eyes without stimulation.  Will repeat Accu-Chek in 1 hour. Chest x-ray was obtained as there is concern for aspiration     6/26/2021 CXR  Impression   Right basilar atelectasis, aspiration or pneumonia.         MBS completed 6/28/21:   Mild-moderate oral stage dysphagia characterized by decreased mastication and decreased lingual manipulation. Prolonged but adequate mastication with comparable effort/labor across textures with textured solids. disorganized, prolonged bolus foration and movement with puree and textured solids. Premature bolus loss to the pharynx with nectar and thin. Mild pharyngeal stage dysphagia characterized by delayed swallow, decreased laryngeal elevation, and decreased pharyngeal peristalsis. Premature spillage to the valleculae with nectar nadh thin and to the pyriform with thin. NO penetration/aspiration or significant residue post-swallow.     CT CHEST 6/28/21:  Impression   Bilateral airspace disease primarily right lower lobe with pleural effusion   most likely multifocal pneumonia.       The gallbladder is almost entirely empty but does show calcified gallstones.               Subjective:     Current diet: Regular, thin liquids    Comments regarding tolerating Current Diet:   Family reports decreased intake r/t declining kidney function; family reports plan for discharge home with hospice (awaiting hospice consult)    Objective:     Pain   Patient Currently in Pain: Denies    Cognitive/Behavior   Behavior/Cognition: Alert, Pleasant mood, Cooperative, Confused, Oriented to self, Cues required to follow dx this date    Presentations   Consistencies Administered: Dysphagia Thin - straw, Reg solid    Positioning: upright in bed    PO Trials:  · Thin Liquids via straw: prolonged oral transit, suspect premature bolus loss to the pharynx; delayed swallow, decreased laryngeal elevation; no coughing episode, throat clear or change in vocal quality  · Nectar thick liquids: DNT  · Honey Thick liquids: DNT  · Puree: DNT  · Soft food: DNT  · Regular food: disorganized and prolonged mastication with adequate clearance of bolus;  prolonged oral transit, suspect premature bolus loss to the pharynx; delayed swallow, decreased laryngeal elevation; no coughing episode, throat clear or change in vocal quality    Dysphagia Tx:   · PO trials: continues to safely tolerate regular/thin  · Comp strats: dependent; family with good understanding and carryover of strats     Goals:   Dysphagia Goals: The patient will tolerate recommended diet without observed clinical signs of aspiration: met  The patient/caregiver will demonstrate understanding of compensatory strategies for improved swallowing safety: met    Assessment:   Impressions:   Dysphagia Diagnosis: Mild oral stage dysphagia, Mild pharyngeal stage dysphagia  · Accepted and tolerated tx feed at bedside. · Patient alert, cooperative, pleasant; apparent confusion - oriented to self only, limited ability to provide history; follows simple dx with cueing. · Clinically appears comparable to above noted MBS. · Mild oropharyngeal dysphagia characterized by decreased mastication, decreased lingual manipulation, delayed swallow, and decreased laryngeal elevation. Bolus formation appears prolonged but adequate. Prolonged transit with all. Suspect premature bolus loss to th pharynx with all. Mild oral residue with textured solids appears to be cleared with liquid wash. continues with intermittent throat clearing which family reports throat clearing prior to admission. MBS confirmed no penetration/aspiration. · Max potential achieved at this time; discontinue skilled ST. Please reconsult should status change or need arise. Diet Recommendations:  Solid consistency: Regular   Liquid consistency: Thin   Medication administration: Meds in puree   Compensatory Swallowing Strategies: Upright as possible for all oral intake, Remain upright for 30-45 minutes after meals    Education:  Consulted and agree with results and recommendations: Patient, family  Patient Education: Completed on results/recs/plan; results of MBS  Patient Education Response: Needs reinforcement    Prognosis:   Good for safe diet tolerance    Plan:     Continue Dysphagia Therapy: NO      Coded treatment time: 0  Total treatment time: 21    Celia Merrill, [id number] [facility] [place] [address]  Speech-Language Pathologist  Portable phone: [phone]  7/1/2021 at 11:58 AM

## 2021-07-01 NOTE — CARE COORDINATION
The Plan for Transition of Care is related to the following treatment goals: hospice at home    Patient's children were provided with a choice of provider and agrees   with the discharge plan. [x] Yes [] No    Freedom of choice list was provided with basic dialogue that supports the patient's individualized plan of care/goals, treatment preferences and shares the quality data associated with the providers. [x] Yes [] No    Electronically signed by Alpesh Lala RN on 7/1/21 at 1:24 PM EDT    [facility] Electronically signed by Alpesh Lala RN on 7/1/21 at 1:27 PM EDT    [facility] to call daughter, they can possible take pt on tomorrow, Dr. Bernadette Leblanc notified. Electronically signed by Alpesh Lala RN on 7/1/21 at 2:38 PM EDT    Family adamant that pt go home tonight, meeting with hospice in the morning at home at 0900. Dc order noted, will dc with f/c still in for pt comfort.     [facility] notified of dc today, still ok to start hospice in am. Will call back if any meds needed for overnight per their request.    Electronically signed by Alepsh Lala RN on 7/1/21 at 3:46 PM EDT

## 2021-07-01 NOTE — PROGRESS NOTES
Physical Therapy  Daily Treatment Attempt    21    Name: Beverley Costa   : 1935    MRN: [id number]    PT follow-up attempt. Son in room reporting that the pt is not doing well - not eating - not acting himself - waiting for nephrologist. Pt refusing to get oob - emotionally labile. Nursing aware. Will continue to follow.     Electronically signed by Le Torres PT on 2021 at 8:25 AM

## 2021-07-01 NOTE — FLOWSHEET NOTE
Discharge instructions and medications reviewed with patient. Pt. Verbalized understanding. Denies questions. Discontinued IV without complications. Pt. tolerated well.       Electronically signed by Jagruti Gimenez RN on 7/1/2021 at 6:14 PM

## 2021-07-15 NOTE — DISCHARGE SUMMARY
Hospital Medicine Discharge Summary    Patient ID: Jordy Radford      Patient's PCP: Newton Da Silva MD    Admit Date: 6/25/2021     Discharge Date: 7/1/2021      Admitting Physician: [facility], MD     Discharge Physician: Nanci Burnett MD     Discharge Diagnoses: Active Hospital Problems    Diagnosis     Fluid overload [E87.70]        The patient was seen and examined on day of discharge and this discharge summary is in conjunction with any daily progress note from day of discharge. Hospital Course: 71-year-old male with past medical history of hypertension, diabetes mellitus, hypothyroidism, history of peripheral arterial disease with right toe amputation, left TMA presented to the hospital with complains of bilateral leg swelling and shortness of breath. Noted to be in fluid overload      Bilateral lower extremity swelling with concern for fluid overload  Chronic diastolic heart failure  MSSA Pneumonia  -Stopped diuresis and giving back some fluid   -Echo with EF 87-44%, grade 1 diastolic dysfunction  -Nephrology following  -Strict input and output  --thick green sputum - I will cont with levaquin and flagyl for pseudomonas and anaerobic coverage.         Acute respiratory failure with hypoxia - likely 2/2 fluid overload and/or pneumonia  - wean oxygen to maintain SpO2 > 89%  - trend procalcitonin  - Abx as above. - CT Chest without contrast showed evidence of multifocal pneumonia        Hypertension  - Continue nifedipine, hydralazine  - hold BB with bradycardia        MELVIN on CKD stage IV - leveled off today.    - Continue monitor creatinine, slightly worsening  - nephrology following  - stopped diuresis and giving back IVF now        Diabetes mellitus  -held scheduled Humalog and Lantus  -continue SSI  -hypoglycemia protocol  -POCT glucose checks  -carb control diet           Peripheral arterial disease  -Status post right second and third toe amputation, left TMA  -Continue Plavix, Lipitor           Hypothyroidism  -Continue Synthroid                Dispo - nephrology had a long discussion with family and pt.      He wants to be at home with his wife - she has advanced dementia.      No plan for dialysis.      Hospice to eval - Family hoping to take him home with hospice palliative care.       Physical Exam Performed:     BP (!) 185/70   Pulse 64   Temp 98.1 °F (36.7 °C) (Oral)   Resp 16   Ht 5' 4\" (1.626 m)   Wt 161 lb 6 oz (73.2 kg)   SpO2 97%   BMI 27.70 kg/m²     General appearance: No apparent distress, appears stated age and cooperative. HEENT: Pupils equal, round, and reactive to light. Conjunctivae/corneas clear. Neck: Supple, with full range of motion. No jugular venous distention. Trachea midline. Respiratory:  Normal respiratory effort. Bibasilar crackles. Cardiovascular: Regular rate and rhythm with normal S1/S2 without murmurs, rubs or gallops. Abdomen: Soft, non-tender, non-distended with normal bowel sounds. Musculoskeletal: No bilateral lower extremity edema improving. Amputations noted right and left feet. Skin: Skin color, texture, turgor normal.  No rashes or lesions. Neurologic:  Neurovascularly intact without any focal sensory/motor deficits. Cranial nerves: II-XII intact, grossly non-focal.  Psychiatric: Alert and oriented, thought content appropriate, normal insight  Capillary Refill: Brisk,3 seconds, normal   Peripheral Pulses: +2 palpable, equal bilaterally     Labs:  For convenience and continuity at follow-up the following most recent labs are provided:      CBC:    Lab Results   Component Value Date    WBC 7.4 07/01/2021    HGB 11.3 07/01/2021    HCT 31.9 07/01/2021     07/01/2021       Renal:    Lab Results   Component Value Date     07/01/2021    K 3.9 07/01/2021    K 4.6 06/25/2021    CL 97 07/01/2021    CO2 22 07/01/2021    BUN 64 07/01/2021    CREATININE 4.1 07/01/2021    CALCIUM 8.5 07/01/2021    PHOS 5.2 07/01/2021 Significant Diagnostic Studies    Radiology:   Fluoroscopy modified barium swallow with video   Final Result   No aspiration noted      Please see separate speech pathology report for full discussion of findings   and recommendations. CT CHEST WO CONTRAST   Final Result   Bilateral airspace disease primarily right lower lobe with pleural effusion   most likely multifocal pneumonia. The gallbladder is almost entirely empty but does show calcified gallstones. XR CHEST PORTABLE   Final Result   Right basilar atelectasis, aspiration or pneumonia. XR CHEST PORTABLE   Final Result   Subtle patchy opacities noted at the right lung base and left upper lung   which may reflect multifocal airspace disease.                 Consults:     IP CONSULT TO NEPHROLOGY  IP CONSULT TO DIETITIAN  IP CONSULT TO HOSPICE    Disposition:  Home with hospice care     Condition at Discharge: Terminal    Code Status:  Prior [facility]    Activity: activity as tolerated    Diet: regular diet      Discharge Medications:     Discharge Medication List as of 7/1/2021  4:40 PM           Details   sodium bicarbonate 650 MG tablet Take 1 tablet by mouth 3 times daily, Disp-90 tablet, R-1Normal      doxazosin (CARDURA) 2 MG tablet Take 1 tablet by mouth daily, Disp-30 tablet, R-3Normal      hydrALAZINE (APRESOLINE) 100 MG tablet Take 1 tablet by mouth every 8 hours, Disp-90 tablet, R-3Normal      NIFEdipine (ADALAT CC) 30 MG extended release tablet Take 1 tablet by mouth daily, Disp-30 tablet, R-1Normal      levoFLOXacin (LEVAQUIN) 500 MG tablet Take 1 tablet by mouth every other day for 10 days, Disp-5 tablet, R-0Normal      metroNIDAZOLE (FLAGYL) 500 MG tablet Take 1 tablet by mouth 3 times daily for 10 days, Disp-30 tablet, R-0Normal              Details   levothyroxine (SYNTHROID) 25 MCG tablet TAKE 1 TABLET BY MOUTH DAILY, Disp-90 tablet, R-1Normal      clopidogrel (PLAVIX) 75 MG tablet TAKE 1 TABLET BY MOUTH EVERY DAY, Disp-90 tablet, R-1Normal      atorvastatin (LIPITOR) 40 MG tablet TAKE 1 TABLET BY MOUTH EVERY DAY, Disp-90 tablet, R-1Normal      insulin lispro, 1 Unit Dial, (HUMALOG KWIKPEN) 100 UNIT/ML SOPN Inject 10 Units into the skin 3 times daily (before meals), Disp-45 mL, R-5Normal      blood glucose test strips (CONTOUR TEST) strip 1 each by In Vitro route 3 times daily, Disp-100 each, R-5Normal      Continuous Blood Gluc Sensor (FREESTYLE KAREN SENSOR SYSTEM) MISC E11.29, Disp-1 each, R-0Normal      Insulin Pen Needle 31G X 6 MM MISC 3 TIMES DAILY Starting Mon 1/7/2019, Disp-100 each, R-6, NormalTest TID DX E11.29      SOFT TOUCH LANCETS MISC 3 TIMES DAILY BEFORE MEALS Starting 5/26/2016, Until Discontinued, Disp-270 each, R-3, Print      Blood Glucose Monitoring Suppl (ACCU-CHEK MARTHA) KENJI 3 TIMES DAILY BEFORE MEALS Starting 5/26/2016, Until Discontinued, Disp-1 Device, R-0, Print      Alcohol Swabs (PHARMACIST CHOICE ALCOHOL) PADS Disp-300 each, R-PRN, Normal      docusate sodium (COLACE) 100 MG capsule Take 100 mg by mouth 2 times daily as needed for Constipation Historical Med      nadolol (CORGARD) 40 MG tablet Take 20 mg by mouth daily Historical Med             Time Spent on discharge is more than 30 minutes in the examination, evaluation, counseling and review of medications and discharge plan. Signed:    Albin Lizama MD   7/15/2021      Thank you Dorene Malave MD for the opportunity to be involved in this patient's care. If you have any questions or concerns please feel free to contact me at [phone].

## 2022-02-11 NOTE — TELEPHONE ENCOUNTER
[phone] - Alex Hoyos advised and verbalized understanding.  She will check insurance first.
If insuraNCE will cover it
Pt is a diabetic,   Mark Stout, daughter on hippa, stated that pt was in for an ov yesterday and pt's son was in the waiting room and there was another pt in the waiting room. This gentleman had a patch that was under his arm and he waved a wand and it reads your sugars immediately. Mark Stout would like to know if her dad (pt) could be prescribed that .     Pt uses [facility] advise   [phone]
Physiological Fall